# Patient Record
Sex: FEMALE | Race: WHITE | Employment: OTHER | URBAN - NONMETROPOLITAN AREA
[De-identification: names, ages, dates, MRNs, and addresses within clinical notes are randomized per-mention and may not be internally consistent; named-entity substitution may affect disease eponyms.]

---

## 2017-01-31 ENCOUNTER — DOCTOR'S OFFICE (OUTPATIENT)
Dept: URBAN - NONMETROPOLITAN AREA CLINIC 1 | Facility: CLINIC | Age: 77
Setting detail: OPHTHALMOLOGY
End: 2017-01-31
Payer: COMMERCIAL

## 2017-01-31 DIAGNOSIS — H43.813: ICD-10-CM

## 2017-01-31 DIAGNOSIS — Z96.1: ICD-10-CM

## 2017-01-31 DIAGNOSIS — H35.373: ICD-10-CM

## 2017-01-31 DIAGNOSIS — E11.9: ICD-10-CM

## 2017-01-31 DIAGNOSIS — H40.003: ICD-10-CM

## 2017-01-31 PROCEDURE — 92014 COMPRE OPH EXAM EST PT 1/>: CPT | Performed by: OPHTHALMOLOGY

## 2017-01-31 PROCEDURE — 76514 ECHO EXAM OF EYE THICKNESS: CPT | Performed by: OPHTHALMOLOGY

## 2017-01-31 PROCEDURE — 92133 CPTRZD OPH DX IMG PST SGM ON: CPT | Performed by: OPHTHALMOLOGY

## 2017-01-31 ASSESSMENT — REFRACTION_AUTOREFRACTION
OD_AXIS: 79
OS_CYLINDER: -2.75
OS_AXIS: 67
OD_CYLINDER: -2.25
OS_SPHERE: +2.75
OD_SPHERE: +2.00

## 2017-01-31 ASSESSMENT — REFRACTION_MANIFEST
OS_VA3: 20/
OS_VA2: 20/
OU_VA: 20/
OD_VA3: 20/
OS_VA1: 20/
OS_VA2: 20/
OD_VA1: 20/
OS_VA1: 20/
OD_VA2: 20/
OD_VA2: 20/
OS_VA1: 20/
OU_VA: 20/
OD_VA3: 20/
OD_VA1: 20/
OU_VA: 20/
OS_VA3: 20/
OS_VA3: 20/
OS_VA2: 20/
OD_VA1: 20/
OD_VA2: 20/
OD_VA3: 20/

## 2017-01-31 ASSESSMENT — REFRACTION_CURRENTRX
OD_OVR_VA: 20/
OS_OVR_VA: 20/
OD_AXIS: 096
OS_CYLINDER: -1.00
OD_CYLINDER: -1.75
OD_OVR_VA: 20/
OS_OVR_VA: 20/
OS_AXIS: 068
OS_OVR_VA: 20/
OS_SPHERE: +1.00
OD_SPHERE: +0.50
OD_OVR_VA: 20/
OS_VPRISM_DIRECTION: SV
OD_VPRISM_DIRECTION: SV

## 2017-01-31 ASSESSMENT — SPHEQUIV_DERIVED
OD_SPHEQUIV: 0.875
OS_SPHEQUIV: 1.375

## 2017-01-31 ASSESSMENT — CONFRONTATIONAL VISUAL FIELD TEST (CVF)
OD_FINDINGS: FULL
OS_FINDINGS: FULL

## 2017-01-31 ASSESSMENT — PACHYMETRY
OD_CT_CORRECTION: -3
OD_CT_UM: 580
OS_CT_UM: 580
OS_CT_CORRECTION: -3

## 2017-01-31 ASSESSMENT — VISUAL ACUITY
OS_BCVA: 20/25
OD_BCVA: 20/25-2

## 2017-01-31 ASSESSMENT — LID POSITION - DERMATOCHALASIS
OD_DERMATOCHALASIS: +2
OS_DERMATOCHALASIS: +2

## 2017-08-08 ENCOUNTER — DOCTOR'S OFFICE (OUTPATIENT)
Dept: URBAN - NONMETROPOLITAN AREA CLINIC 1 | Facility: CLINIC | Age: 77
Setting detail: OPHTHALMOLOGY
End: 2017-08-08
Payer: COMMERCIAL

## 2017-08-08 DIAGNOSIS — H43.813: ICD-10-CM

## 2017-08-08 DIAGNOSIS — H40.003: ICD-10-CM

## 2017-08-08 DIAGNOSIS — E11.9: ICD-10-CM

## 2017-08-08 DIAGNOSIS — E11.3293: ICD-10-CM

## 2017-08-08 DIAGNOSIS — Z96.1: ICD-10-CM

## 2017-08-08 DIAGNOSIS — H35.373: ICD-10-CM

## 2017-08-08 PROCEDURE — 92083 EXTENDED VISUAL FIELD XM: CPT | Performed by: OPHTHALMOLOGY

## 2017-08-08 PROCEDURE — 92250 FUNDUS PHOTOGRAPHY W/I&R: CPT | Performed by: OPHTHALMOLOGY

## 2017-08-08 PROCEDURE — 92014 COMPRE OPH EXAM EST PT 1/>: CPT | Performed by: OPHTHALMOLOGY

## 2017-08-08 PROCEDURE — 92134 CPTRZ OPH DX IMG PST SGM RTA: CPT | Performed by: OPHTHALMOLOGY

## 2017-08-08 ASSESSMENT — REFRACTION_AUTOREFRACTION
OS_AXIS: 71
OD_CYLINDER: -2.00
OS_SPHERE: +2.50
OD_AXIS: 85
OD_SPHERE: +1.00
OS_CYLINDER: -2.50

## 2017-08-08 ASSESSMENT — VISUAL ACUITY
OD_BCVA: 20/30+1
OS_BCVA: 20/25-2

## 2017-08-08 ASSESSMENT — REFRACTION_MANIFEST
OU_VA: 20/
OD_VA1: 20/
OS_VA1: 20/
OS_VA2: 20/
OS_VA1: 20/
OD_VA3: 20/
OS_VA3: 20/
OS_VA2: 20/
OS_VA2: 20/
OD_VA2: 20/
OD_VA2: 20/
OU_VA: 20/
OU_VA: 20/
OS_VA1: 20/
OS_VA3: 20/
OD_VA1: 20/
OS_VA3: 20/
OD_VA2: 20/
OD_VA1: 20/
OD_VA3: 20/
OD_VA3: 20/

## 2017-08-08 ASSESSMENT — REFRACTION_CURRENTRX
OS_OVR_VA: 20/
OS_OVR_VA: 20/
OD_OVR_VA: 20/
OD_SPHERE: +0.50
OS_VPRISM_DIRECTION: SV
OD_OVR_VA: 20/
OD_VPRISM_DIRECTION: SV
OS_SPHERE: +1.00
OS_AXIS: 068
OD_CYLINDER: -1.75
OD_AXIS: 096
OD_OVR_VA: 20/
OS_CYLINDER: -1.00
OS_OVR_VA: 20/

## 2017-08-08 ASSESSMENT — SPHEQUIV_DERIVED
OD_SPHEQUIV: 0
OS_SPHEQUIV: 1.25

## 2017-08-08 ASSESSMENT — LID POSITION - DERMATOCHALASIS
OS_DERMATOCHALASIS: 2+
OD_DERMATOCHALASIS: 2+

## 2017-08-08 ASSESSMENT — CONFRONTATIONAL VISUAL FIELD TEST (CVF)
OS_FINDINGS: FULL
OD_FINDINGS: FULL

## 2017-08-21 ENCOUNTER — DOCTOR'S OFFICE (OUTPATIENT)
Dept: URBAN - NONMETROPOLITAN AREA CLINIC 1 | Facility: CLINIC | Age: 77
Setting detail: OPHTHALMOLOGY
End: 2017-08-21
Payer: COMMERCIAL

## 2017-08-21 DIAGNOSIS — H40.003: ICD-10-CM

## 2017-08-21 DIAGNOSIS — H43.813: ICD-10-CM

## 2017-08-21 DIAGNOSIS — H35.373: ICD-10-CM

## 2017-08-21 DIAGNOSIS — E11.3293: ICD-10-CM

## 2017-08-21 DIAGNOSIS — Z96.1: ICD-10-CM

## 2017-08-21 PROCEDURE — 92014 COMPRE OPH EXAM EST PT 1/>: CPT | Performed by: OPHTHALMOLOGY

## 2017-08-21 ASSESSMENT — REFRACTION_CURRENTRX
OD_SPHERE: +0.50
OS_AXIS: 068
OD_CYLINDER: -1.75
OS_OVR_VA: 20/
OS_SPHERE: +1.00
OD_OVR_VA: 20/
OS_OVR_VA: 20/
OS_CYLINDER: -1.00
OD_AXIS: 096
OS_VPRISM_DIRECTION: SV
OD_VPRISM_DIRECTION: SV
OD_OVR_VA: 20/
OS_OVR_VA: 20/
OD_OVR_VA: 20/

## 2017-08-21 ASSESSMENT — REFRACTION_MANIFEST
OS_VA3: 20/
OD_VA1: 20/
OD_VA2: 20/
OS_VA3: 20/
OD_VA1: 20/
OS_VA1: 20/
OD_VA3: 20/
OD_VA3: 20/
OS_VA2: 20/
OU_VA: 20/
OD_VA3: 20/
OD_VA2: 20/
OD_VA2: 20/
OU_VA: 20/
OS_VA3: 20/
OS_VA1: 20/
OS_VA2: 20/
OS_VA1: 20/
OU_VA: 20/
OS_VA2: 20/
OD_VA1: 20/

## 2017-08-21 ASSESSMENT — REFRACTION_AUTOREFRACTION
OS_CYLINDER: -2.50
OD_CYLINDER: -2.00
OD_SPHERE: +1.00
OS_SPHERE: +2.50
OD_AXIS: 85
OS_AXIS: 71

## 2017-08-21 ASSESSMENT — SPHEQUIV_DERIVED
OS_SPHEQUIV: 1.25
OD_SPHEQUIV: 0

## 2017-08-21 ASSESSMENT — CONFRONTATIONAL VISUAL FIELD TEST (CVF)
OS_FINDINGS: FULL
OD_FINDINGS: FULL

## 2017-08-21 ASSESSMENT — VISUAL ACUITY
OS_BCVA: 20/30
OD_BCVA: 20/30-2

## 2017-08-21 ASSESSMENT — LID POSITION - DERMATOCHALASIS
OD_DERMATOCHALASIS: 2+
OS_DERMATOCHALASIS: 2+

## 2017-09-28 ENCOUNTER — DOCTOR'S OFFICE (OUTPATIENT)
Dept: URBAN - NONMETROPOLITAN AREA CLINIC 1 | Facility: CLINIC | Age: 77
Setting detail: OPHTHALMOLOGY
End: 2017-09-28
Payer: COMMERCIAL

## 2017-09-28 DIAGNOSIS — H43.811: ICD-10-CM

## 2017-09-28 DIAGNOSIS — H43.812: ICD-10-CM

## 2017-09-28 DIAGNOSIS — H40.003: ICD-10-CM

## 2017-09-28 DIAGNOSIS — H43.813: ICD-10-CM

## 2017-09-28 DIAGNOSIS — E11.3293: ICD-10-CM

## 2017-09-28 DIAGNOSIS — H35.3131: ICD-10-CM

## 2017-09-28 DIAGNOSIS — H35.373: ICD-10-CM

## 2017-09-28 PROCEDURE — 92014 COMPRE OPH EXAM EST PT 1/>: CPT | Performed by: OPHTHALMOLOGY

## 2017-09-28 PROCEDURE — 92250 FUNDUS PHOTOGRAPHY W/I&R: CPT | Performed by: OPHTHALMOLOGY

## 2017-09-28 PROCEDURE — 92226 OPHTHALMOSCOPY EXT SUBSEQUENT: CPT | Performed by: OPHTHALMOLOGY

## 2017-09-28 PROCEDURE — 92235 FLUORESCEIN ANGRPH MLTIFRAME: CPT | Performed by: OPHTHALMOLOGY

## 2017-09-28 ASSESSMENT — REFRACTION_MANIFEST
OU_VA: 20/
OD_VA2: 20/
OD_VA3: 20/
OS_VA2: 20/
OD_VA2: 20/
OD_VA1: 20/
OS_VA3: 20/
OD_VA1: 20/
OU_VA: 20/
OS_VA2: 20/
OD_VA3: 20/
OS_VA2: 20/
OD_VA3: 20/
OD_VA2: 20/
OS_VA3: 20/
OS_VA1: 20/
OS_VA1: 20/
OS_VA3: 20/
OS_VA1: 20/
OD_VA1: 20/
OU_VA: 20/

## 2017-09-28 ASSESSMENT — REFRACTION_AUTOREFRACTION
OS_SPHERE: +2.50
OD_CYLINDER: -2.00
OD_SPHERE: +1.00
OD_AXIS: 85
OS_AXIS: 71
OS_CYLINDER: -2.50

## 2017-09-28 ASSESSMENT — REFRACTION_CURRENTRX
OS_SPHERE: +1.00
OD_OVR_VA: 20/
OS_OVR_VA: 20/
OS_OVR_VA: 20/
OS_CYLINDER: -1.00
OD_SPHERE: +0.50
OD_OVR_VA: 20/
OD_CYLINDER: -1.75
OS_AXIS: 068
OD_AXIS: 096
OD_OVR_VA: 20/
OS_OVR_VA: 20/
OS_VPRISM_DIRECTION: SV
OD_VPRISM_DIRECTION: SV

## 2017-09-28 ASSESSMENT — VISUAL ACUITY
OD_BCVA: 20/40
OS_BCVA: 20/50

## 2017-09-28 ASSESSMENT — SPHEQUIV_DERIVED
OD_SPHEQUIV: 0
OS_SPHEQUIV: 1.25

## 2017-09-28 ASSESSMENT — CONFRONTATIONAL VISUAL FIELD TEST (CVF)
OS_FINDINGS: FULL
OD_FINDINGS: FULL

## 2017-09-28 ASSESSMENT — LID POSITION - DERMATOCHALASIS
OS_DERMATOCHALASIS: 2+
OD_DERMATOCHALASIS: 2+

## 2018-02-07 ENCOUNTER — DOCTOR'S OFFICE (OUTPATIENT)
Dept: URBAN - NONMETROPOLITAN AREA CLINIC 1 | Facility: CLINIC | Age: 78
Setting detail: OPHTHALMOLOGY
End: 2018-02-07
Payer: COMMERCIAL

## 2018-02-07 DIAGNOSIS — H35.373: ICD-10-CM

## 2018-02-07 DIAGNOSIS — H43.813: ICD-10-CM

## 2018-02-07 DIAGNOSIS — H35.3131: ICD-10-CM

## 2018-02-07 DIAGNOSIS — H40.013: ICD-10-CM

## 2018-02-07 DIAGNOSIS — E11.9: ICD-10-CM

## 2018-02-07 PROCEDURE — 92014 COMPRE OPH EXAM EST PT 1/>: CPT | Performed by: OPHTHALMOLOGY

## 2018-02-07 PROCEDURE — 76514 ECHO EXAM OF EYE THICKNESS: CPT | Performed by: OPHTHALMOLOGY

## 2018-02-07 PROCEDURE — 92133 CPTRZD OPH DX IMG PST SGM ON: CPT | Performed by: OPHTHALMOLOGY

## 2018-02-07 ASSESSMENT — REFRACTION_CURRENTRX
OD_SPHERE: +0.50
OD_AXIS: 096
OS_SPHERE: +1.00
OS_OVR_VA: 20/
OD_VPRISM_DIRECTION: PROGS
OS_CYLINDER: -1.00
OD_OVR_VA: 20/
OD_OVR_VA: 20/
OS_OVR_VA: 20/
OD_ADD: +1.50
OS_ADD: +1.50
OS_VPRISM_DIRECTION: PROGS
OD_OVR_VA: 20/
OS_AXIS: 068
OD_CYLINDER: -1.75
OS_OVR_VA: 20/

## 2018-02-07 ASSESSMENT — REFRACTION_AUTOREFRACTION
OS_AXIS: 59
OS_SPHERE: +1.50
OS_CYLINDER: -1.50
OD_AXIS: 101
OD_SPHERE: +0.75
OD_CYLINDER: -1.50

## 2018-02-07 ASSESSMENT — REFRACTION_MANIFEST
OS_VA2: 20/
OS_VA3: 20/
OD_VA2: 20/
OD_VA1: 20/
OS_VA3: 20/
OS_VA1: 20/
OS_VA1: 20/
OD_VA3: 20/
OU_VA: 20/
OS_VA1: 20/
OU_VA: 20/
OD_VA3: 20/
OS_VA3: 20/
OD_VA2: 20/
OS_VA2: 20/
OD_VA1: 20/
OD_VA1: 20/
OU_VA: 20/
OS_VA2: 20/
OD_VA3: 20/
OD_VA2: 20/

## 2018-02-07 ASSESSMENT — VISUAL ACUITY
OS_BCVA: 20/30
OD_BCVA: 20/30

## 2018-02-07 ASSESSMENT — SPHEQUIV_DERIVED
OS_SPHEQUIV: 0.75
OD_SPHEQUIV: 0

## 2018-02-07 ASSESSMENT — PACHYMETRY
OS_CT_CORRECTION: -1
OS_CT_UM: 567
OD_CT_UM: 575
OD_CT_CORRECTION: -2

## 2018-02-07 ASSESSMENT — LID POSITION - DERMATOCHALASIS
OS_DERMATOCHALASIS: 2+
OD_DERMATOCHALASIS: 2+

## 2018-02-07 ASSESSMENT — CONFRONTATIONAL VISUAL FIELD TEST (CVF)
OS_FINDINGS: FULL
OD_FINDINGS: FULL

## 2018-02-07 ASSESSMENT — DRY EYES - PHYSICIAN NOTES
OD_GENERALCOMMENTS: KSICCA
OS_GENERALCOMMENTS: KSICCA

## 2018-03-29 ENCOUNTER — DOCTOR'S OFFICE (OUTPATIENT)
Dept: URBAN - NONMETROPOLITAN AREA CLINIC 1 | Facility: CLINIC | Age: 78
Setting detail: OPHTHALMOLOGY
End: 2018-03-29
Payer: COMMERCIAL

## 2018-03-29 DIAGNOSIS — H35.373: ICD-10-CM

## 2018-03-29 DIAGNOSIS — E11.9: ICD-10-CM

## 2018-03-29 DIAGNOSIS — H43.811: ICD-10-CM

## 2018-03-29 DIAGNOSIS — H40.003: ICD-10-CM

## 2018-03-29 DIAGNOSIS — H43.813: ICD-10-CM

## 2018-03-29 DIAGNOSIS — H35.3131: ICD-10-CM

## 2018-03-29 DIAGNOSIS — H43.812: ICD-10-CM

## 2018-03-29 PROCEDURE — 92134 CPTRZ OPH DX IMG PST SGM RTA: CPT | Performed by: OPHTHALMOLOGY

## 2018-03-29 PROCEDURE — 92226 OPHTHALMOSCOPY EXT SUBSEQUENT: CPT | Performed by: OPHTHALMOLOGY

## 2018-03-29 PROCEDURE — 92014 COMPRE OPH EXAM EST PT 1/>: CPT | Performed by: OPHTHALMOLOGY

## 2018-03-29 ASSESSMENT — REFRACTION_CURRENTRX
OD_OVR_VA: 20/
OS_OVR_VA: 20/
OS_SPHERE: +1.00
OS_AXIS: 068
OD_SPHERE: +0.50
OD_AXIS: 096
OS_CYLINDER: -1.00
OS_VPRISM_DIRECTION: PROGS
OS_OVR_VA: 20/
OD_OVR_VA: 20/
OD_OVR_VA: 20/
OS_OVR_VA: 20/
OD_CYLINDER: -1.75
OS_ADD: +1.50
OD_ADD: +1.50
OD_VPRISM_DIRECTION: PROGS

## 2018-03-29 ASSESSMENT — DRY EYES - PHYSICIAN NOTES
OS_GENERALCOMMENTS: KSICCA
OD_GENERALCOMMENTS: KSICCA

## 2018-03-29 ASSESSMENT — REFRACTION_MANIFEST
OS_VA2: 20/
OD_VA3: 20/
OD_VA3: 20/
OS_VA2: 20/
OU_VA: 20/
OS_VA1: 20/
OU_VA: 20/
OD_VA2: 20/
OD_VA1: 20/
OD_VA3: 20/
OS_VA3: 20/
OS_VA3: 20/
OD_VA2: 20/
OD_VA1: 20/
OD_VA2: 20/
OD_VA1: 20/
OU_VA: 20/
OS_VA3: 20/
OS_VA2: 20/

## 2018-03-29 ASSESSMENT — CONFRONTATIONAL VISUAL FIELD TEST (CVF)
OD_FINDINGS: FULL
OS_FINDINGS: FULL

## 2018-03-29 ASSESSMENT — LID POSITION - DERMATOCHALASIS
OS_DERMATOCHALASIS: 2+
OD_DERMATOCHALASIS: 2+

## 2018-03-29 ASSESSMENT — REFRACTION_AUTOREFRACTION
OS_CYLINDER: -1.50
OS_AXIS: 59
OS_SPHERE: +1.50
OD_AXIS: 101
OD_CYLINDER: -1.50
OD_SPHERE: +0.75

## 2018-03-29 ASSESSMENT — SPHEQUIV_DERIVED
OD_SPHEQUIV: 0
OS_SPHEQUIV: 0.75

## 2018-03-29 ASSESSMENT — VISUAL ACUITY
OS_BCVA: 20/40-2
OD_BCVA: 20/30

## 2018-04-12 ENCOUNTER — DOCTOR'S OFFICE (OUTPATIENT)
Dept: URBAN - NONMETROPOLITAN AREA CLINIC 1 | Facility: CLINIC | Age: 78
Setting detail: OPHTHALMOLOGY
End: 2018-04-12
Payer: COMMERCIAL

## 2018-04-12 DIAGNOSIS — H43.813: ICD-10-CM

## 2018-04-12 DIAGNOSIS — E11.9: ICD-10-CM

## 2018-04-12 DIAGNOSIS — H35.3131: ICD-10-CM

## 2018-04-12 DIAGNOSIS — H35.373: ICD-10-CM

## 2018-04-12 DIAGNOSIS — H35.3112: ICD-10-CM

## 2018-04-12 DIAGNOSIS — H40.003: ICD-10-CM

## 2018-04-12 PROCEDURE — 92014 COMPRE OPH EXAM EST PT 1/>: CPT | Performed by: OPHTHALMOLOGY

## 2018-04-12 PROCEDURE — 92235 FLUORESCEIN ANGRPH MLTIFRAME: CPT | Performed by: OPHTHALMOLOGY

## 2018-04-12 PROCEDURE — 92250 FUNDUS PHOTOGRAPHY W/I&R: CPT | Performed by: OPHTHALMOLOGY

## 2018-04-12 ASSESSMENT — REFRACTION_AUTOREFRACTION
OS_AXIS: 59
OD_CYLINDER: -1.50
OD_SPHERE: +0.75
OD_AXIS: 101
OS_SPHERE: +1.50
OS_CYLINDER: -1.50

## 2018-04-12 ASSESSMENT — REFRACTION_MANIFEST
OD_VA2: 20/
OD_VA1: 20/
OS_VA1: 20/
OS_VA2: 20/
OD_VA3: 20/
OD_VA2: 20/
OU_VA: 20/
OS_VA2: 20/
OD_VA1: 20/
OS_VA3: 20/
OD_VA3: 20/
OD_VA2: 20/
OD_VA3: 20/
OS_VA3: 20/
OS_VA3: 20/
OS_VA1: 20/
OS_VA1: 20/
OU_VA: 20/
OS_VA2: 20/
OU_VA: 20/
OD_VA1: 20/

## 2018-04-12 ASSESSMENT — REFRACTION_CURRENTRX
OS_OVR_VA: 20/
OD_AXIS: 096
OS_CYLINDER: -1.00
OS_AXIS: 068
OD_OVR_VA: 20/
OD_OVR_VA: 20/
OD_ADD: +1.50
OD_OVR_VA: 20/
OD_SPHERE: +0.50
OS_SPHERE: +1.00
OS_ADD: +1.50
OS_OVR_VA: 20/
OD_CYLINDER: -1.75
OS_VPRISM_DIRECTION: PROGS
OS_OVR_VA: 20/
OD_VPRISM_DIRECTION: PROGS

## 2018-04-12 ASSESSMENT — DRY EYES - PHYSICIAN NOTES
OD_GENERALCOMMENTS: KSICCA
OS_GENERALCOMMENTS: KSICCA

## 2018-04-12 ASSESSMENT — CONFRONTATIONAL VISUAL FIELD TEST (CVF)
OS_FINDINGS: FULL
OD_FINDINGS: FULL

## 2018-04-12 ASSESSMENT — VISUAL ACUITY
OD_BCVA: 20/30-1
OS_BCVA: 20/40+2

## 2018-04-12 ASSESSMENT — LID POSITION - DERMATOCHALASIS
OD_DERMATOCHALASIS: 2+
OS_DERMATOCHALASIS: 2+

## 2018-04-12 ASSESSMENT — SPHEQUIV_DERIVED
OS_SPHEQUIV: 0.75
OD_SPHEQUIV: 0

## 2018-05-21 ENCOUNTER — DOCTOR'S OFFICE (OUTPATIENT)
Dept: URBAN - NONMETROPOLITAN AREA CLINIC 1 | Facility: CLINIC | Age: 78
Setting detail: OPHTHALMOLOGY
End: 2018-05-21
Payer: COMMERCIAL

## 2018-05-21 DIAGNOSIS — H35.3131: ICD-10-CM

## 2018-05-21 DIAGNOSIS — E11.9: ICD-10-CM

## 2018-05-21 DIAGNOSIS — H40.003: ICD-10-CM

## 2018-05-21 DIAGNOSIS — H35.3112: ICD-10-CM

## 2018-05-21 DIAGNOSIS — H43.813: ICD-10-CM

## 2018-05-21 DIAGNOSIS — H43.812: ICD-10-CM

## 2018-05-21 DIAGNOSIS — H35.373: ICD-10-CM

## 2018-05-21 DIAGNOSIS — H43.811: ICD-10-CM

## 2018-05-21 PROCEDURE — 92226 OPHTHALMOSCOPY EXT SUBSEQUENT: CPT | Performed by: OPHTHALMOLOGY

## 2018-05-21 PROCEDURE — 92134 CPTRZ OPH DX IMG PST SGM RTA: CPT | Performed by: OPHTHALMOLOGY

## 2018-05-21 PROCEDURE — 92014 COMPRE OPH EXAM EST PT 1/>: CPT | Performed by: OPHTHALMOLOGY

## 2018-05-21 ASSESSMENT — REFRACTION_AUTOREFRACTION
OD_SPHERE: +0.75
OD_CYLINDER: -1.50
OS_CYLINDER: -1.50
OS_SPHERE: +1.50
OD_AXIS: 101
OS_AXIS: 59

## 2018-05-21 ASSESSMENT — REFRACTION_MANIFEST
OS_VA2: 20/
OU_VA: 20/
OD_VA3: 20/
OD_VA1: 20/
OD_VA2: 20/
OS_VA3: 20/
OU_VA: 20/
OS_VA2: 20/
OS_VA2: 20/
OD_VA3: 20/
OD_VA1: 20/
OS_VA1: 20/
OU_VA: 20/
OD_VA2: 20/
OS_VA3: 20/
OD_VA1: 20/
OS_VA3: 20/
OS_VA1: 20/
OD_VA2: 20/
OS_VA1: 20/
OD_VA3: 20/

## 2018-05-21 ASSESSMENT — LID POSITION - DERMATOCHALASIS
OD_DERMATOCHALASIS: 2+
OS_DERMATOCHALASIS: 2+

## 2018-05-21 ASSESSMENT — REFRACTION_CURRENTRX
OD_ADD: +1.50
OS_VPRISM_DIRECTION: PROGS
OD_AXIS: 096
OS_SPHERE: +1.00
OS_ADD: +1.50
OS_AXIS: 068
OS_OVR_VA: 20/
OS_OVR_VA: 20/
OD_OVR_VA: 20/
OD_OVR_VA: 20/
OD_SPHERE: +0.50
OD_CYLINDER: -1.75
OS_CYLINDER: -1.00
OS_OVR_VA: 20/
OD_OVR_VA: 20/
OD_VPRISM_DIRECTION: PROGS

## 2018-05-21 ASSESSMENT — CONFRONTATIONAL VISUAL FIELD TEST (CVF)
OD_FINDINGS: FULL
OS_FINDINGS: FULL

## 2018-05-21 ASSESSMENT — SPHEQUIV_DERIVED
OS_SPHEQUIV: 0.75
OD_SPHEQUIV: 0

## 2018-05-21 ASSESSMENT — VISUAL ACUITY
OD_BCVA: 20/30-2
OS_BCVA: 20/30

## 2018-05-21 ASSESSMENT — DRY EYES - PHYSICIAN NOTES
OD_GENERALCOMMENTS: KSICCA
OS_GENERALCOMMENTS: KSICCA

## 2018-06-18 ENCOUNTER — RX ONLY (RX ONLY)
Age: 78
End: 2018-06-18

## 2018-06-18 ENCOUNTER — DOCTOR'S OFFICE (OUTPATIENT)
Dept: URBAN - NONMETROPOLITAN AREA CLINIC 1 | Facility: CLINIC | Age: 78
Setting detail: OPHTHALMOLOGY
End: 2018-06-18
Payer: COMMERCIAL

## 2018-06-18 DIAGNOSIS — H04.122: ICD-10-CM

## 2018-06-18 DIAGNOSIS — H43.811: ICD-10-CM

## 2018-06-18 DIAGNOSIS — H35.373: ICD-10-CM

## 2018-06-18 DIAGNOSIS — H35.3112: ICD-10-CM

## 2018-06-18 DIAGNOSIS — H43.813: ICD-10-CM

## 2018-06-18 DIAGNOSIS — H43.812: ICD-10-CM

## 2018-06-18 DIAGNOSIS — H35.3131: ICD-10-CM

## 2018-06-18 DIAGNOSIS — H04.121: ICD-10-CM

## 2018-06-18 PROCEDURE — 92226 OPHTHALMOSCOPY EXT SUBSEQUENT: CPT | Performed by: OPHTHALMOLOGY

## 2018-06-18 PROCEDURE — 92134 CPTRZ OPH DX IMG PST SGM RTA: CPT | Performed by: OPHTHALMOLOGY

## 2018-06-18 PROCEDURE — 92014 COMPRE OPH EXAM EST PT 1/>: CPT | Performed by: OPHTHALMOLOGY

## 2018-06-18 PROCEDURE — 83861 MICROFLUID ANALY TEARS: CPT | Performed by: OPHTHALMOLOGY

## 2018-06-18 ASSESSMENT — REFRACTION_MANIFEST
OS_VA1: 20/
OS_VA1: 20/
OU_VA: 20/
OD_VA2: 20/
OS_VA2: 20/
OS_VA2: 20/
OD_VA1: 20/
OS_VA1: 20/
OS_VA3: 20/
OS_VA3: 20/
OD_VA3: 20/
OD_VA3: 20/
OS_VA2: 20/
OD_VA2: 20/
OS_VA3: 20/
OD_VA1: 20/
OD_VA2: 20/
OD_VA1: 20/
OU_VA: 20/
OD_VA3: 20/
OU_VA: 20/

## 2018-06-18 ASSESSMENT — REFRACTION_CURRENTRX
OS_ADD: +1.50
OD_CYLINDER: -1.75
OS_OVR_VA: 20/
OD_OVR_VA: 20/
OS_OVR_VA: 20/
OS_SPHERE: +1.00
OS_OVR_VA: 20/
OD_OVR_VA: 20/
OD_ADD: +1.50
OS_VPRISM_DIRECTION: PROGS
OD_SPHERE: +0.50
OS_CYLINDER: -1.00
OD_VPRISM_DIRECTION: PROGS
OD_OVR_VA: 20/
OD_AXIS: 096
OS_AXIS: 068

## 2018-06-18 ASSESSMENT — VISUAL ACUITY
OD_BCVA: 20/25-2
OS_BCVA: 20/30+1

## 2018-06-18 ASSESSMENT — DRY EYES - PHYSICIAN NOTES
OS_GENERALCOMMENTS: KSICCA
OD_GENERALCOMMENTS: KSICCA

## 2018-06-18 ASSESSMENT — SPHEQUIV_DERIVED
OD_SPHEQUIV: 0
OS_SPHEQUIV: 0.75

## 2018-06-18 ASSESSMENT — REFRACTION_AUTOREFRACTION
OD_SPHERE: +0.75
OS_CYLINDER: -1.50
OS_SPHERE: +1.50
OS_AXIS: 59
OD_CYLINDER: -1.50
OD_AXIS: 101

## 2018-06-18 ASSESSMENT — CONFRONTATIONAL VISUAL FIELD TEST (CVF)
OS_FINDINGS: FULL
OD_FINDINGS: FULL

## 2018-06-18 ASSESSMENT — LID POSITION - DERMATOCHALASIS
OS_DERMATOCHALASIS: 2+
OD_DERMATOCHALASIS: 2+

## 2018-07-20 ENCOUNTER — DOCTOR'S OFFICE (OUTPATIENT)
Dept: URBAN - NONMETROPOLITAN AREA CLINIC 1 | Facility: CLINIC | Age: 78
Setting detail: OPHTHALMOLOGY
End: 2018-07-20
Payer: COMMERCIAL

## 2018-07-20 DIAGNOSIS — H35.373: ICD-10-CM

## 2018-07-20 DIAGNOSIS — H43.811: ICD-10-CM

## 2018-07-20 DIAGNOSIS — H43.812: ICD-10-CM

## 2018-07-20 DIAGNOSIS — H35.3131: ICD-10-CM

## 2018-07-20 DIAGNOSIS — H35.3112: ICD-10-CM

## 2018-07-20 DIAGNOSIS — H04.122: ICD-10-CM

## 2018-07-20 DIAGNOSIS — H43.813: ICD-10-CM

## 2018-07-20 DIAGNOSIS — H04.123: ICD-10-CM

## 2018-07-20 DIAGNOSIS — H04.121: ICD-10-CM

## 2018-07-20 DIAGNOSIS — E11.9: ICD-10-CM

## 2018-07-20 PROCEDURE — 92014 COMPRE OPH EXAM EST PT 1/>: CPT | Performed by: OPHTHALMOLOGY

## 2018-07-20 PROCEDURE — 83861 MICROFLUID ANALY TEARS: CPT | Performed by: OPHTHALMOLOGY

## 2018-07-20 PROCEDURE — 92134 CPTRZ OPH DX IMG PST SGM RTA: CPT | Performed by: OPHTHALMOLOGY

## 2018-07-20 PROCEDURE — 92226 OPHTHALMOSCOPY EXT SUBSEQUENT: CPT | Performed by: OPHTHALMOLOGY

## 2018-07-20 ASSESSMENT — REFRACTION_MANIFEST
OD_VA2: 20/
OU_VA: 20/
OD_VA3: 20/
OS_VA2: 20/
OD_VA2: 20/
OS_VA3: 20/
OS_VA2: 20/
OU_VA: 20/
OD_VA1: 20/
OS_VA1: 20/
OU_VA: 20/
OD_VA3: 20/
OD_VA1: 20/
OD_VA3: 20/
OD_VA2: 20/
OS_VA2: 20/
OD_VA1: 20/
OS_VA1: 20/
OS_VA3: 20/
OS_VA3: 20/
OS_VA1: 20/

## 2018-07-20 ASSESSMENT — VISUAL ACUITY
OS_BCVA: 20/25-1
OD_BCVA: 20/40+2

## 2018-07-20 ASSESSMENT — REFRACTION_CURRENTRX
OD_ADD: +1.50
OS_OVR_VA: 20/
OD_OVR_VA: 20/
OD_OVR_VA: 20/
OS_VPRISM_DIRECTION: PROGS
OS_ADD: +1.50
OS_AXIS: 068
OD_VPRISM_DIRECTION: PROGS
OS_OVR_VA: 20/
OD_SPHERE: +0.50
OD_AXIS: 096
OS_SPHERE: +1.00
OD_OVR_VA: 20/
OS_CYLINDER: -1.00
OS_OVR_VA: 20/
OD_CYLINDER: -1.75

## 2018-07-20 ASSESSMENT — DRY EYES - PHYSICIAN NOTES
OD_GENERALCOMMENTS: KSICCA
OS_GENERALCOMMENTS: KSICCA

## 2018-07-20 ASSESSMENT — SPHEQUIV_DERIVED
OD_SPHEQUIV: 0
OS_SPHEQUIV: 0.75

## 2018-07-20 ASSESSMENT — CONFRONTATIONAL VISUAL FIELD TEST (CVF)
OS_FINDINGS: FULL
OD_FINDINGS: FULL

## 2018-07-20 ASSESSMENT — REFRACTION_AUTOREFRACTION
OS_SPHERE: +1.50
OS_CYLINDER: -1.50
OS_AXIS: 59
OD_SPHERE: +0.75
OD_CYLINDER: -1.50
OD_AXIS: 101

## 2018-07-20 ASSESSMENT — LID POSITION - DERMATOCHALASIS
OS_DERMATOCHALASIS: 2+
OD_DERMATOCHALASIS: 2+

## 2018-08-06 ENCOUNTER — DOCTOR'S OFFICE (OUTPATIENT)
Dept: URBAN - NONMETROPOLITAN AREA CLINIC 1 | Facility: CLINIC | Age: 78
Setting detail: OPHTHALMOLOGY
End: 2018-08-06
Payer: COMMERCIAL

## 2018-08-06 DIAGNOSIS — H43.813: ICD-10-CM

## 2018-08-06 DIAGNOSIS — H35.3112: ICD-10-CM

## 2018-08-06 DIAGNOSIS — H40.003: ICD-10-CM

## 2018-08-06 DIAGNOSIS — H02.834: ICD-10-CM

## 2018-08-06 DIAGNOSIS — H02.831: ICD-10-CM

## 2018-08-06 DIAGNOSIS — H35.3131: ICD-10-CM

## 2018-08-06 DIAGNOSIS — H53.122: ICD-10-CM

## 2018-08-06 DIAGNOSIS — E11.9: ICD-10-CM

## 2018-08-06 DIAGNOSIS — H35.373: ICD-10-CM

## 2018-08-06 DIAGNOSIS — H04.123: ICD-10-CM

## 2018-08-06 PROCEDURE — 92014 COMPRE OPH EXAM EST PT 1/>: CPT | Performed by: OPHTHALMOLOGY

## 2018-08-06 PROCEDURE — 92250 FUNDUS PHOTOGRAPHY W/I&R: CPT | Performed by: OPHTHALMOLOGY

## 2018-08-06 PROCEDURE — 92235 FLUORESCEIN ANGRPH MLTIFRAME: CPT | Performed by: OPHTHALMOLOGY

## 2018-08-06 ASSESSMENT — REFRACTION_AUTOREFRACTION
OD_SPHERE: +0.75
OS_AXIS: 59
OD_AXIS: 101
OS_SPHERE: +1.50
OS_CYLINDER: -1.50
OD_CYLINDER: -1.50

## 2018-08-06 ASSESSMENT — REFRACTION_MANIFEST
OS_VA2: 20/
OD_VA3: 20/
OD_VA2: 20/
OD_VA1: 20/
OS_VA1: 20/
OU_VA: 20/
OS_VA2: 20/
OU_VA: 20/
OD_VA2: 20/
OD_VA2: 20/
OS_VA2: 20/
OS_VA3: 20/
OS_VA3: 20/
OU_VA: 20/
OS_VA3: 20/
OD_VA1: 20/
OS_VA1: 20/
OD_VA3: 20/
OD_VA1: 20/
OS_VA1: 20/
OD_VA3: 20/

## 2018-08-06 ASSESSMENT — REFRACTION_CURRENTRX
OS_OVR_VA: 20/
OS_AXIS: 068
OD_OVR_VA: 20/
OS_CYLINDER: -1.00
OS_VPRISM_DIRECTION: PROGS
OD_OVR_VA: 20/
OD_ADD: +1.50
OS_SPHERE: +1.00
OS_OVR_VA: 20/
OD_SPHERE: +0.50
OD_OVR_VA: 20/
OD_AXIS: 096
OS_OVR_VA: 20/
OS_ADD: +1.50
OD_CYLINDER: -1.75
OD_VPRISM_DIRECTION: PROGS

## 2018-08-06 ASSESSMENT — SPHEQUIV_DERIVED
OS_SPHEQUIV: 0.75
OD_SPHEQUIV: 0

## 2018-08-06 ASSESSMENT — CONFRONTATIONAL VISUAL FIELD TEST (CVF)
OD_FINDINGS: FULL
OS_FINDINGS: FULL

## 2018-08-06 ASSESSMENT — DRY EYES - PHYSICIAN NOTES
OS_GENERALCOMMENTS: KSICCA
OD_GENERALCOMMENTS: KSICCA

## 2018-08-06 ASSESSMENT — LID POSITION - DERMATOCHALASIS
OD_DERMATOCHALASIS: RUL 2+ 3+
OS_DERMATOCHALASIS: LUL 2+

## 2018-08-06 ASSESSMENT — VISUAL ACUITY
OD_BCVA: 20/40-1
OS_BCVA: 20/25

## 2018-08-07 ENCOUNTER — DOCTOR'S OFFICE (OUTPATIENT)
Dept: URBAN - NONMETROPOLITAN AREA CLINIC 1 | Facility: CLINIC | Age: 78
Setting detail: OPHTHALMOLOGY
End: 2018-08-07
Payer: COMMERCIAL

## 2018-08-07 DIAGNOSIS — H02.831: ICD-10-CM

## 2018-08-07 DIAGNOSIS — H53.122: ICD-10-CM

## 2018-08-07 DIAGNOSIS — E11.9: ICD-10-CM

## 2018-08-07 DIAGNOSIS — H35.3112: ICD-10-CM

## 2018-08-07 DIAGNOSIS — H35.3131: ICD-10-CM

## 2018-08-07 DIAGNOSIS — Z96.1: ICD-10-CM

## 2018-08-07 DIAGNOSIS — H43.813: ICD-10-CM

## 2018-08-07 DIAGNOSIS — H35.373: ICD-10-CM

## 2018-08-07 DIAGNOSIS — H40.013: ICD-10-CM

## 2018-08-07 DIAGNOSIS — H04.123: ICD-10-CM

## 2018-08-07 PROCEDURE — 92083 EXTENDED VISUAL FIELD XM: CPT | Performed by: OPHTHALMOLOGY

## 2018-08-07 PROCEDURE — 92014 COMPRE OPH EXAM EST PT 1/>: CPT | Performed by: OPHTHALMOLOGY

## 2018-08-07 ASSESSMENT — REFRACTION_CURRENTRX
OD_OVR_VA: 20/
OD_SPHERE: +0.50
OS_AXIS: 068
OS_OVR_VA: 20/
OD_AXIS: 096
OS_OVR_VA: 20/
OD_VPRISM_DIRECTION: PROGS
OS_VPRISM_DIRECTION: PROGS
OD_OVR_VA: 20/
OD_OVR_VA: 20/
OS_CYLINDER: -1.00
OD_CYLINDER: -1.75
OS_OVR_VA: 20/
OD_ADD: +1.50
OS_ADD: +1.50
OS_SPHERE: +1.00

## 2018-08-07 ASSESSMENT — REFRACTION_MANIFEST
OD_VA1: 20/
OS_VA3: 20/
OS_VA2: 20/
OS_VA1: 20/
OS_VA2: 20/
OU_VA: 20/
OS_VA3: 20/
OS_VA3: 20/
OS_VA2: 20/
OU_VA: 20/
OD_VA3: 20/
OD_VA1: 20/
OD_VA3: 20/
OS_VA1: 20/
OD_VA3: 20/
OD_VA1: 20/
OD_VA2: 20/
OS_VA1: 20/
OU_VA: 20/

## 2018-08-07 ASSESSMENT — LID POSITION - DERMATOCHALASIS
OD_DERMATOCHALASIS: RUL 2+ 3+
OS_DERMATOCHALASIS: LUL 2+

## 2018-08-07 ASSESSMENT — REFRACTION_AUTOREFRACTION
OD_SPHERE: +0.75
OD_AXIS: 101
OD_CYLINDER: -1.50
OS_CYLINDER: -1.50
OS_SPHERE: +1.50
OS_AXIS: 59

## 2018-08-07 ASSESSMENT — CONFRONTATIONAL VISUAL FIELD TEST (CVF)
OS_FINDINGS: FULL
OD_FINDINGS: FULL

## 2018-08-07 ASSESSMENT — SPHEQUIV_DERIVED
OD_SPHEQUIV: 0
OS_SPHEQUIV: 0.75

## 2018-08-07 ASSESSMENT — DRY EYES - PHYSICIAN NOTES
OD_GENERALCOMMENTS: KSICCA
OS_GENERALCOMMENTS: KSICCA

## 2018-08-07 ASSESSMENT — VISUAL ACUITY
OS_BCVA: 20/25-1
OD_BCVA: 20/25-1

## 2018-09-06 ENCOUNTER — OPTICAL OFFICE (OUTPATIENT)
Dept: URBAN - NONMETROPOLITAN AREA CLINIC 4 | Facility: CLINIC | Age: 78
Setting detail: OPHTHALMOLOGY
End: 2018-09-06

## 2018-09-06 ENCOUNTER — DOCTOR'S OFFICE (OUTPATIENT)
Dept: URBAN - NONMETROPOLITAN AREA CLINIC 1 | Facility: CLINIC | Age: 78
Setting detail: OPHTHALMOLOGY
End: 2018-09-06
Payer: COMMERCIAL

## 2018-09-06 DIAGNOSIS — Z96.1: ICD-10-CM

## 2018-09-06 DIAGNOSIS — H52.223: ICD-10-CM

## 2018-09-06 DIAGNOSIS — H40.013: ICD-10-CM

## 2018-09-06 DIAGNOSIS — H52.4: ICD-10-CM

## 2018-09-06 DIAGNOSIS — H04.121: ICD-10-CM

## 2018-09-06 DIAGNOSIS — H04.122: ICD-10-CM

## 2018-09-06 PROCEDURE — V2762 POLARIZATION, ANY LENS: HCPCS | Performed by: OPTOMETRIST

## 2018-09-06 PROCEDURE — 92015 DETERMINE REFRACTIVE STATE: CPT | Performed by: OPTOMETRIST

## 2018-09-06 PROCEDURE — V2020 VISION SVCS FRAMES PURCHASES: HCPCS | Performed by: OPTOMETRIST

## 2018-09-06 PROCEDURE — V2781 PROGRESSIVE LENS PER LENS: HCPCS | Performed by: OPTOMETRIST

## 2018-09-06 PROCEDURE — 92012 INTRM OPH EXAM EST PATIENT: CPT | Performed by: OPTOMETRIST

## 2018-09-06 ASSESSMENT — DRY EYES - PHYSICIAN NOTES
OD_GENERALCOMMENTS: KSICCA
OS_GENERALCOMMENTS: KSICCA

## 2018-09-06 ASSESSMENT — REFRACTION_MANIFEST
OS_SPHERE: +1.50
OS_VA3: 20/
OS_VA2: 20/
OD_VA1: 20/20
OD_ADD: +2.75
OD_AXIS: 105
OS_CYLINDER: -1.25
OS_ADD: +2.75
OS_VA1: 20/
OS_VA3: 20/
OU_VA: 20/
OD_VA2: 20/
OD_VA3: 20/
OD_VA1: 20/
OD_VA3: 20/
OD_VA2: 20/
OD_CYLINDER: -1.75
OU_VA: 20/
OS_VA2: 20/
OS_VA1: 20/20
OD_SPHERE: +0.75
OS_AXIS: 65

## 2018-09-06 ASSESSMENT — REFRACTION_CURRENTRX
OS_ADD: +1.00
OS_OVR_VA: 20/
OS_OVR_VA: 20/
OD_OVR_VA: 20/
OD_CYLINDER: -1.50
OD_ADD: +1.75
OS_OVR_VA: 20/
OS_CYLINDER: -1.00
OS_AXIS: 091
OS_VPRISM_DIRECTION: PROGS
OD_SPHERE: PLANO
OD_OVR_VA: 20/
OD_OVR_VA: 20/
OD_AXIS: 090
OS_SPHERE: +1.75
OD_VPRISM_DIRECTION: PROGS

## 2018-09-06 ASSESSMENT — KERATOMETRY
OS_K1POWER_DIOPTERS: +2.25
OD_K2POWER_DIOPTERS: -1.50
OD_AXISANGLE_DEGREES: 104
OD_K1POWER_DIOPTERS: +.75
OS_AXISANGLE_DEGREES: 071
OS_K2POWER_DIOPTERS: -1.75

## 2018-09-06 ASSESSMENT — AXIALLENGTH_DERIVED
OD_AL: 74.4462
OS_AL: 69.17
OD_AL: 74.93
OS_AL: 69.5895

## 2018-09-06 ASSESSMENT — REFRACTION_AUTOREFRACTION
OS_CYLINDER: -1.50
OD_AXIS: 101
OS_SPHERE: +1.50
OS_AXIS: 59
OD_SPHERE: +0.75
OD_CYLINDER: -1.50

## 2018-09-06 ASSESSMENT — SPHEQUIV_DERIVED
OD_SPHEQUIV: 0
OS_SPHEQUIV: 0.75
OS_SPHEQUIV: 0.875
OD_SPHEQUIV: -0.125

## 2018-09-06 ASSESSMENT — LID POSITION - DERMATOCHALASIS
OS_DERMATOCHALASIS: LUL 2+
OD_DERMATOCHALASIS: RUL 2+ 3+

## 2018-09-06 ASSESSMENT — VISUAL ACUITY
OD_BCVA: 20/40
OS_BCVA: 20/40

## 2018-10-04 ENCOUNTER — DOCTOR'S OFFICE (OUTPATIENT)
Dept: URBAN - NONMETROPOLITAN AREA CLINIC 1 | Facility: CLINIC | Age: 78
Setting detail: OPHTHALMOLOGY
End: 2018-10-04
Payer: COMMERCIAL

## 2018-10-04 DIAGNOSIS — H35.3112: ICD-10-CM

## 2018-10-04 DIAGNOSIS — H40.013: ICD-10-CM

## 2018-10-04 DIAGNOSIS — H35.3131: ICD-10-CM

## 2018-10-04 DIAGNOSIS — H04.121: ICD-10-CM

## 2018-10-04 DIAGNOSIS — H04.122: ICD-10-CM

## 2018-10-04 DIAGNOSIS — H35.373: ICD-10-CM

## 2018-10-04 DIAGNOSIS — H43.811: ICD-10-CM

## 2018-10-04 DIAGNOSIS — H43.812: ICD-10-CM

## 2018-10-04 DIAGNOSIS — H43.813: ICD-10-CM

## 2018-10-04 PROCEDURE — 83861 MICROFLUID ANALY TEARS: CPT | Performed by: OPHTHALMOLOGY

## 2018-10-04 PROCEDURE — 92134 CPTRZ OPH DX IMG PST SGM RTA: CPT | Performed by: OPHTHALMOLOGY

## 2018-10-04 PROCEDURE — 92226 OPHTHALMOSCOPY EXT SUBSEQUENT: CPT | Performed by: OPHTHALMOLOGY

## 2018-10-04 PROCEDURE — 92014 COMPRE OPH EXAM EST PT 1/>: CPT | Performed by: OPHTHALMOLOGY

## 2018-10-04 ASSESSMENT — REFRACTION_CURRENTRX
OS_OVR_VA: 20/
OD_ADD: +1.75
OS_OVR_VA: 20/
OD_SPHERE: PLANO
OS_VPRISM_DIRECTION: PROGS
OS_AXIS: 091
OD_CYLINDER: -1.50
OS_CYLINDER: -1.00
OD_OVR_VA: 20/
OD_VPRISM_DIRECTION: PROGS
OS_SPHERE: +1.75
OD_AXIS: 090
OD_OVR_VA: 20/
OD_OVR_VA: 20/
OS_ADD: +1.00
OS_OVR_VA: 20/

## 2018-10-04 ASSESSMENT — REFRACTION_MANIFEST
OS_ADD: +2.75
OS_AXIS: 65
OS_VA1: 20/20
OS_CYLINDER: -1.25
OD_VA1: 20/20
OD_VA1: 20/
OS_VA3: 20/
OU_VA: 20/
OD_CYLINDER: -1.75
OD_VA3: 20/
OS_VA1: 20/
OD_VA3: 20/
OU_VA: 20/
OS_VA2: 20/
OS_VA2: 20/
OS_SPHERE: +1.50
OD_ADD: +2.75
OD_VA2: 20/
OD_VA2: 20/
OD_SPHERE: +0.75
OD_AXIS: 105
OS_VA3: 20/

## 2018-10-04 ASSESSMENT — REFRACTION_AUTOREFRACTION
OD_CYLINDER: -1.50
OD_SPHERE: +0.75
OD_AXIS: 101
OS_CYLINDER: -1.50
OS_SPHERE: +1.50
OS_AXIS: 59

## 2018-10-04 ASSESSMENT — CONFRONTATIONAL VISUAL FIELD TEST (CVF)
OD_FINDINGS: FULL
OS_FINDINGS: FULL

## 2018-10-04 ASSESSMENT — LID POSITION - DERMATOCHALASIS
OS_DERMATOCHALASIS: LUL 2+
OD_DERMATOCHALASIS: RUL 2+ 3+

## 2018-10-04 ASSESSMENT — SPHEQUIV_DERIVED
OS_SPHEQUIV: 0.75
OS_SPHEQUIV: 0.875
OD_SPHEQUIV: 0
OD_SPHEQUIV: -0.125

## 2018-10-04 ASSESSMENT — VISUAL ACUITY
OD_BCVA: 20/25-1
OS_BCVA: 20/25-1

## 2018-10-04 ASSESSMENT — DRY EYES - PHYSICIAN NOTES
OD_GENERALCOMMENTS: KSICCA
OS_GENERALCOMMENTS: KSICCA

## 2018-10-29 ENCOUNTER — DOCTOR'S OFFICE (OUTPATIENT)
Dept: URBAN - NONMETROPOLITAN AREA CLINIC 1 | Facility: CLINIC | Age: 78
Setting detail: OPHTHALMOLOGY
End: 2018-10-29
Payer: COMMERCIAL

## 2018-10-29 DIAGNOSIS — H02.403: ICD-10-CM

## 2018-10-29 DIAGNOSIS — H02.423: ICD-10-CM

## 2018-10-29 DIAGNOSIS — H02.831: ICD-10-CM

## 2018-10-29 DIAGNOSIS — H02.413: ICD-10-CM

## 2018-10-29 DIAGNOSIS — Z96.1: ICD-10-CM

## 2018-10-29 DIAGNOSIS — H02.834: ICD-10-CM

## 2018-10-29 DIAGNOSIS — H04.123: ICD-10-CM

## 2018-10-29 DIAGNOSIS — H02.433: ICD-10-CM

## 2018-10-29 PROCEDURE — 92012 INTRM OPH EXAM EST PATIENT: CPT | Performed by: OPHTHALMOLOGY

## 2018-10-29 ASSESSMENT — REFRACTION_MANIFEST
OS_VA3: 20/
OS_AXIS: 65
OD_AXIS: 105
OS_SPHERE: +1.50
OD_VA1: 20/
OD_VA2: 20/
OU_VA: 20/
OS_ADD: +2.75
OS_VA2: 20/
OD_VA3: 20/
OS_VA1: 20/
OS_VA2: 20/
OD_ADD: +2.75
OD_CYLINDER: -1.75
OD_VA2: 20/
OS_VA3: 20/
OD_VA3: 20/
OD_VA1: 20/20
OS_CYLINDER: -1.25
OU_VA: 20/
OS_VA1: 20/20
OD_SPHERE: +0.75

## 2018-10-29 ASSESSMENT — SPHEQUIV_DERIVED
OS_SPHEQUIV: 0.875
OS_SPHEQUIV: 0.75
OD_SPHEQUIV: -0.125
OD_SPHEQUIV: 0

## 2018-10-29 ASSESSMENT — REFRACTION_CURRENTRX
OD_ADD: +1.75
OD_OVR_VA: 20/
OS_SPHERE: +1.75
OS_CYLINDER: -1.00
OD_AXIS: 090
OD_SPHERE: PLANO
OS_OVR_VA: 20/
OS_ADD: +1.00
OD_VPRISM_DIRECTION: PROGS
OD_OVR_VA: 20/
OS_AXIS: 091
OS_OVR_VA: 20/
OS_VPRISM_DIRECTION: PROGS
OS_OVR_VA: 20/
OD_CYLINDER: -1.50
OD_OVR_VA: 20/

## 2018-10-29 ASSESSMENT — AXIALLENGTH_DERIVED
OS_AL: 70.3729
OD_AL: 75.37
OS_AL: 70.81
OD_AL: 75.8723

## 2018-10-29 ASSESSMENT — KERATOMETRY
OS_K1POWER_DIOPTERS: +1.00
OD_K1POWER_DIOPTERS: +0.75
OS_AXISANGLE_DEGREES: 064
OS_K2POWER_DIOPTERS: -1.25
OD_AXISANGLE_DEGREES: 118
OD_K2POWER_DIOPTERS: -2.00

## 2018-10-29 ASSESSMENT — LID POSITION - PTOSIS
OS_PTOSIS: 1+
OD_PTOSIS: 1+

## 2018-10-29 ASSESSMENT — REFRACTION_AUTOREFRACTION
OS_CYLINDER: -1.50
OD_SPHERE: +0.75
OS_AXIS: 59
OD_CYLINDER: -1.50
OD_AXIS: 101
OS_SPHERE: +1.50

## 2018-10-29 ASSESSMENT — CONFRONTATIONAL VISUAL FIELD TEST (CVF)
OD_FINDINGS: FULL
OS_FINDINGS: FULL

## 2018-10-29 ASSESSMENT — LID POSITION - COMMENTS
OD_COMMENTS: MRD1: 2/2
OS_COMMENTS: MRD1: 2/2

## 2018-10-29 ASSESSMENT — DRY EYES - PHYSICIAN NOTES
OD_GENERALCOMMENTS: KSICCA
OS_GENERALCOMMENTS: KSICCA

## 2018-10-29 ASSESSMENT — LID POSITION - DERMATOCHALASIS
OD_DERMATOCHALASIS: RUL 2+
OS_DERMATOCHALASIS: LUL 2+

## 2018-10-29 ASSESSMENT — VISUAL ACUITY
OS_BCVA: 20/30-2
OD_BCVA: 20/30-2

## 2018-11-12 ENCOUNTER — DOCTOR'S OFFICE (OUTPATIENT)
Dept: URBAN - NONMETROPOLITAN AREA CLINIC 1 | Facility: CLINIC | Age: 78
Setting detail: OPHTHALMOLOGY
End: 2018-11-12
Payer: COMMERCIAL

## 2018-11-12 DIAGNOSIS — H04.121: ICD-10-CM

## 2018-11-12 DIAGNOSIS — H04.122: ICD-10-CM

## 2018-11-12 DIAGNOSIS — H35.3131: ICD-10-CM

## 2018-11-12 DIAGNOSIS — H35.3112: ICD-10-CM

## 2018-11-12 DIAGNOSIS — H43.812: ICD-10-CM

## 2018-11-12 DIAGNOSIS — H02.423: ICD-10-CM

## 2018-11-12 DIAGNOSIS — H02.413: ICD-10-CM

## 2018-11-12 DIAGNOSIS — H02.433: ICD-10-CM

## 2018-11-12 DIAGNOSIS — H43.811: ICD-10-CM

## 2018-11-12 DIAGNOSIS — H02.403: ICD-10-CM

## 2018-11-12 DIAGNOSIS — H35.373: ICD-10-CM

## 2018-11-12 PROCEDURE — 92226 OPHTHALMOSCOPY EXT SUBSEQUENT: CPT | Performed by: OPHTHALMOLOGY

## 2018-11-12 PROCEDURE — 83861 MICROFLUID ANALY TEARS: CPT | Performed by: OPHTHALMOLOGY

## 2018-11-12 PROCEDURE — 92014 COMPRE OPH EXAM EST PT 1/>: CPT | Performed by: OPHTHALMOLOGY

## 2018-11-12 PROCEDURE — 92134 CPTRZ OPH DX IMG PST SGM RTA: CPT | Performed by: OPHTHALMOLOGY

## 2018-11-12 ASSESSMENT — REFRACTION_MANIFEST
OD_VA3: 20/
OD_VA2: 20/
OU_VA: 20/
OD_ADD: +2.75
OD_CYLINDER: -1.75
OS_ADD: +2.75
OU_VA: 20/
OD_VA1: 20/20
OS_VA1: 20/20
OS_VA3: 20/
OS_CYLINDER: -1.25
OS_VA1: 20/
OD_VA1: 20/
OD_VA2: 20/
OD_AXIS: 105
OS_VA3: 20/
OS_VA2: 20/
OD_VA3: 20/
OS_VA2: 20/
OS_SPHERE: +1.50
OD_SPHERE: +0.75
OS_AXIS: 65

## 2018-11-12 ASSESSMENT — REFRACTION_CURRENTRX
OD_OVR_VA: 20/
OS_OVR_VA: 20/
OD_AXIS: 090
OS_SPHERE: +1.75
OS_CYLINDER: -1.00
OS_ADD: +1.00
OD_SPHERE: PLANO
OD_OVR_VA: 20/
OD_VPRISM_DIRECTION: PROGS
OD_CYLINDER: -1.50
OS_VPRISM_DIRECTION: PROGS
OS_OVR_VA: 20/
OD_ADD: +1.75
OS_AXIS: 091
OD_OVR_VA: 20/
OS_OVR_VA: 20/

## 2018-11-12 ASSESSMENT — REFRACTION_AUTOREFRACTION
OD_AXIS: 101
OS_CYLINDER: -1.50
OD_SPHERE: +0.75
OS_AXIS: 59
OD_CYLINDER: -1.50
OS_SPHERE: +1.50

## 2018-11-12 ASSESSMENT — SPHEQUIV_DERIVED
OD_SPHEQUIV: 0
OS_SPHEQUIV: 0.875
OD_SPHEQUIV: -0.125
OS_SPHEQUIV: 0.75

## 2018-11-12 ASSESSMENT — CONFRONTATIONAL VISUAL FIELD TEST (CVF)
OS_FINDINGS: FULL
OD_FINDINGS: FULL

## 2018-11-12 ASSESSMENT — LID POSITION - PTOSIS
OS_PTOSIS: 1+
OD_PTOSIS: 1+

## 2018-11-12 ASSESSMENT — LID POSITION - COMMENTS
OS_COMMENTS: MRD1: 2/2
OD_COMMENTS: MRD1: 2/2

## 2018-11-12 ASSESSMENT — LID POSITION - DERMATOCHALASIS
OD_DERMATOCHALASIS: RUL 2+
OS_DERMATOCHALASIS: LUL 2+

## 2018-11-12 ASSESSMENT — VISUAL ACUITY
OD_BCVA: 20/25
OS_BCVA: 20/30

## 2018-11-12 ASSESSMENT — DRY EYES - PHYSICIAN NOTES
OD_GENERALCOMMENTS: KSICCA
OS_GENERALCOMMENTS: KSICCA

## 2018-11-29 ENCOUNTER — DOCTOR'S OFFICE (OUTPATIENT)
Dept: URBAN - NONMETROPOLITAN AREA CLINIC 1 | Facility: CLINIC | Age: 78
Setting detail: OPHTHALMOLOGY
End: 2018-11-29
Payer: COMMERCIAL

## 2018-11-29 DIAGNOSIS — H35.3131: ICD-10-CM

## 2018-11-29 DIAGNOSIS — E11.3293: ICD-10-CM

## 2018-11-29 DIAGNOSIS — H35.373: ICD-10-CM

## 2018-11-29 DIAGNOSIS — H04.123: ICD-10-CM

## 2018-11-29 DIAGNOSIS — H40.013: ICD-10-CM

## 2018-11-29 PROCEDURE — 92235 FLUORESCEIN ANGRPH MLTIFRAME: CPT | Performed by: OPHTHALMOLOGY

## 2018-11-29 PROCEDURE — 92250 FUNDUS PHOTOGRAPHY W/I&R: CPT | Performed by: OPHTHALMOLOGY

## 2018-11-29 PROCEDURE — 92014 COMPRE OPH EXAM EST PT 1/>: CPT | Performed by: OPHTHALMOLOGY

## 2018-11-29 ASSESSMENT — REFRACTION_AUTOREFRACTION
OD_AXIS: 101
OS_CYLINDER: -1.50
OD_SPHERE: +0.75
OD_CYLINDER: -1.50
OS_AXIS: 59
OS_SPHERE: +1.50

## 2018-11-29 ASSESSMENT — REFRACTION_CURRENTRX
OS_CYLINDER: -1.00
OS_AXIS: 091
OD_OVR_VA: 20/
OD_AXIS: 090
OD_ADD: +1.75
OD_VPRISM_DIRECTION: PROGS
OS_ADD: +1.00
OD_OVR_VA: 20/
OS_VPRISM_DIRECTION: PROGS
OD_OVR_VA: 20/
OS_OVR_VA: 20/
OS_SPHERE: +1.75
OD_CYLINDER: -1.50
OS_OVR_VA: 20/
OS_OVR_VA: 20/
OD_SPHERE: PLANO

## 2018-11-29 ASSESSMENT — SPHEQUIV_DERIVED
OS_SPHEQUIV: 0.75
OD_SPHEQUIV: -0.125
OD_SPHEQUIV: 0
OS_SPHEQUIV: 0.875

## 2018-11-29 ASSESSMENT — REFRACTION_MANIFEST
OD_VA1: 20/20
OS_VA2: 20/
OS_VA2: 20/
OS_VA3: 20/
OD_AXIS: 105
OS_SPHERE: +1.50
OD_VA2: 20/
OS_VA1: 20/
OS_ADD: +2.75
OU_VA: 20/
OD_VA1: 20/
OD_ADD: +2.75
OS_VA1: 20/20
OD_CYLINDER: -1.75
OS_VA3: 20/
OS_AXIS: 65
OU_VA: 20/
OD_VA3: 20/
OD_SPHERE: +0.75
OD_VA2: 20/
OS_CYLINDER: -1.25
OD_VA3: 20/

## 2018-11-29 ASSESSMENT — LID POSITION - PTOSIS
OS_PTOSIS: 1+
OD_PTOSIS: 1+

## 2018-11-29 ASSESSMENT — DRY EYES - PHYSICIAN NOTES
OD_GENERALCOMMENTS: KSICCA
OS_GENERALCOMMENTS: KSICCA

## 2018-11-29 ASSESSMENT — VISUAL ACUITY
OS_BCVA: 20/30
OD_BCVA: 20/25

## 2018-11-29 ASSESSMENT — LID POSITION - DERMATOCHALASIS
OS_DERMATOCHALASIS: LUL 2+
OD_DERMATOCHALASIS: RUL 2+

## 2018-11-29 ASSESSMENT — LID POSITION - COMMENTS
OD_COMMENTS: MRD1: 2/2
OS_COMMENTS: MRD1: 2/2

## 2018-11-29 ASSESSMENT — CONFRONTATIONAL VISUAL FIELD TEST (CVF)
OS_FINDINGS: FULL
OD_FINDINGS: FULL

## 2019-01-10 ENCOUNTER — DOCTOR'S OFFICE (OUTPATIENT)
Dept: URBAN - NONMETROPOLITAN AREA CLINIC 1 | Facility: CLINIC | Age: 79
Setting detail: OPHTHALMOLOGY
End: 2019-01-10
Payer: COMMERCIAL

## 2019-01-10 DIAGNOSIS — E11.3293: ICD-10-CM

## 2019-01-10 DIAGNOSIS — H43.813: ICD-10-CM

## 2019-01-10 DIAGNOSIS — H04.122: ICD-10-CM

## 2019-01-10 DIAGNOSIS — H04.123: ICD-10-CM

## 2019-01-10 DIAGNOSIS — H35.373: ICD-10-CM

## 2019-01-10 DIAGNOSIS — H04.121: ICD-10-CM

## 2019-01-10 PROCEDURE — 83861 MICROFLUID ANALY TEARS: CPT | Performed by: OPHTHALMOLOGY

## 2019-01-10 PROCEDURE — 92014 COMPRE OPH EXAM EST PT 1/>: CPT | Performed by: OPHTHALMOLOGY

## 2019-01-10 PROCEDURE — 92134 CPTRZ OPH DX IMG PST SGM RTA: CPT | Performed by: OPHTHALMOLOGY

## 2019-01-10 ASSESSMENT — REFRACTION_MANIFEST
OD_VA2: 20/
OD_CYLINDER: -1.75
OS_VA1: 20/
OS_VA2: 20/
OD_SPHERE: +0.75
OD_VA3: 20/
OS_VA3: 20/
OU_VA: 20/
OS_CYLINDER: -1.25
OS_VA1: 20/20
OD_AXIS: 105
OS_ADD: +2.75
OS_SPHERE: +1.50
OD_ADD: +2.75
OD_VA1: 20/
OS_VA3: 20/
OD_VA2: 20/
OD_VA1: 20/20
OS_AXIS: 65
OU_VA: 20/
OD_VA3: 20/
OS_VA2: 20/

## 2019-01-10 ASSESSMENT — LID POSITION - COMMENTS
OD_COMMENTS: MRD1: 2/2
OS_COMMENTS: MRD1: 2/2

## 2019-01-10 ASSESSMENT — REFRACTION_AUTOREFRACTION
OD_AXIS: 101
OS_AXIS: 59
OS_CYLINDER: -1.50
OD_SPHERE: +0.75
OD_CYLINDER: -1.50
OS_SPHERE: +1.50

## 2019-01-10 ASSESSMENT — REFRACTION_CURRENTRX
OS_AXIS: 091
OD_SPHERE: PLANO
OD_ADD: +1.75
OD_OVR_VA: 20/
OD_OVR_VA: 20/
OS_OVR_VA: 20/
OS_VPRISM_DIRECTION: PROGS
OD_CYLINDER: -1.50
OS_ADD: +1.00
OD_AXIS: 090
OD_OVR_VA: 20/
OS_SPHERE: +1.75
OD_VPRISM_DIRECTION: PROGS
OS_OVR_VA: 20/
OS_OVR_VA: 20/
OS_CYLINDER: -1.00

## 2019-01-10 ASSESSMENT — DRY EYES - PHYSICIAN NOTES
OD_GENERALCOMMENTS: KSICCA
OS_GENERALCOMMENTS: KSICCA

## 2019-01-10 ASSESSMENT — LID POSITION - DERMATOCHALASIS
OD_DERMATOCHALASIS: RUL 2+
OS_DERMATOCHALASIS: LUL 2+

## 2019-01-10 ASSESSMENT — SPHEQUIV_DERIVED
OD_SPHEQUIV: 0
OS_SPHEQUIV: 0.875
OS_SPHEQUIV: 0.75
OD_SPHEQUIV: -0.125

## 2019-01-10 ASSESSMENT — CONFRONTATIONAL VISUAL FIELD TEST (CVF)
OS_FINDINGS: FULL
OD_FINDINGS: FULL

## 2019-01-10 ASSESSMENT — VISUAL ACUITY
OS_BCVA: 20/30+2
OD_BCVA: 20/25

## 2019-01-10 ASSESSMENT — LID POSITION - PTOSIS
OD_PTOSIS: 1+
OS_PTOSIS: 1+

## 2019-02-06 ENCOUNTER — DOCTOR'S OFFICE (OUTPATIENT)
Dept: URBAN - NONMETROPOLITAN AREA CLINIC 1 | Facility: CLINIC | Age: 79
Setting detail: OPHTHALMOLOGY
End: 2019-02-06
Payer: COMMERCIAL

## 2019-02-06 DIAGNOSIS — H35.373: ICD-10-CM

## 2019-02-06 DIAGNOSIS — H35.3112: ICD-10-CM

## 2019-02-06 DIAGNOSIS — H02.413: ICD-10-CM

## 2019-02-06 DIAGNOSIS — H35.3131: ICD-10-CM

## 2019-02-06 DIAGNOSIS — H02.831: ICD-10-CM

## 2019-02-06 DIAGNOSIS — H40.013: ICD-10-CM

## 2019-02-06 DIAGNOSIS — H02.834: ICD-10-CM

## 2019-02-06 DIAGNOSIS — H02.403: ICD-10-CM

## 2019-02-06 DIAGNOSIS — H04.121: ICD-10-CM

## 2019-02-06 DIAGNOSIS — H02.423: ICD-10-CM

## 2019-02-06 DIAGNOSIS — H02.433: ICD-10-CM

## 2019-02-06 DIAGNOSIS — E11.3293: ICD-10-CM

## 2019-02-06 PROBLEM — H53.122: Status: RESOLVED | Noted: 2018-08-06 | Resolved: 2019-02-06

## 2019-02-06 PROCEDURE — 76514 ECHO EXAM OF EYE THICKNESS: CPT | Performed by: OPHTHALMOLOGY

## 2019-02-06 PROCEDURE — 92133 CPTRZD OPH DX IMG PST SGM ON: CPT | Performed by: OPHTHALMOLOGY

## 2019-02-06 PROCEDURE — 92014 COMPRE OPH EXAM EST PT 1/>: CPT | Performed by: OPHTHALMOLOGY

## 2019-02-06 ASSESSMENT — REFRACTION_MANIFEST
OD_VA3: 20/
OS_VA3: 20/
OD_AXIS: 105
OU_VA: 20/
OD_VA1: 20/20
OD_VA2: 20/
OS_VA1: 20/
OD_VA2: 20/
OS_CYLINDER: -1.25
OU_VA: 20/
OS_VA2: 20/
OD_VA1: 20/
OD_VA3: 20/
OS_VA3: 20/
OS_VA2: 20/
OD_SPHERE: +0.75
OS_ADD: +2.75
OS_AXIS: 65
OS_VA1: 20/20
OD_ADD: +2.75
OD_CYLINDER: -1.75
OS_SPHERE: +1.50

## 2019-02-06 ASSESSMENT — KERATOMETRY
OD_K1POWER_DIOPTERS: +0.75
OD_K2POWER_DIOPTERS: -2.00
OD_AXISANGLE_DEGREES: 118
OS_AXISANGLE_DEGREES: 064
OS_K1POWER_DIOPTERS: +1.00
OS_K2POWER_DIOPTERS: -1.25

## 2019-02-06 ASSESSMENT — REFRACTION_CURRENTRX
OD_SPHERE: +0.50
OS_AXIS: 074
OS_SPHERE: +1.25
OS_ADD: +2.75
OS_VPRISM_DIRECTION: PROGS
OD_VPRISM_DIRECTION: PROGS
OS_OVR_VA: 20/
OD_CYLINDER: -1.75
OD_OVR_VA: 20/
OS_OVR_VA: 20/
OD_ADD: +2.75
OS_OVR_VA: 20/
OD_OVR_VA: 20/
OD_AXIS: 104
OS_CYLINDER: -1.25
OD_OVR_VA: 20/

## 2019-02-06 ASSESSMENT — REFRACTION_AUTOREFRACTION
OS_SPHERE: +1.25
OD_AXIS: 104
OS_AXIS: 074
OS_CYLINDER: -1.25
OD_CYLINDER: -1.75
OD_SPHERE: +0.25

## 2019-02-06 ASSESSMENT — SPHEQUIV_DERIVED
OS_SPHEQUIV: 0.625
OD_SPHEQUIV: -0.125
OS_SPHEQUIV: 0.875
OD_SPHEQUIV: -0.625

## 2019-02-06 ASSESSMENT — LID POSITION - DERMATOCHALASIS
OS_DERMATOCHALASIS: LUL 2+
OD_DERMATOCHALASIS: RUL 2+

## 2019-02-06 ASSESSMENT — DRY EYES - PHYSICIAN NOTES
OD_GENERALCOMMENTS: KSICCA
OS_GENERALCOMMENTS: KSICCA

## 2019-02-06 ASSESSMENT — AXIALLENGTH_DERIVED
OD_AL: 75.8723
OD_AL: 77.94
OS_AL: 71.25
OS_AL: 70.3729

## 2019-02-06 ASSESSMENT — PACHYMETRY
OD_CT_UM: 575
OS_CT_CORRECTION: -1
OD_CT_CORRECTION: -2
OS_CT_UM: 567

## 2019-02-06 ASSESSMENT — LID POSITION - COMMENTS
OS_COMMENTS: MRD1: 2/2
OD_COMMENTS: MRD1: 2/2

## 2019-02-06 ASSESSMENT — LID POSITION - PTOSIS
OS_PTOSIS: 1+
OD_PTOSIS: 1+

## 2019-02-06 ASSESSMENT — VISUAL ACUITY
OS_BCVA: 20/30+2
OD_BCVA: 20/30+2

## 2019-02-06 ASSESSMENT — CONFRONTATIONAL VISUAL FIELD TEST (CVF)
OD_FINDINGS: FULL
OS_FINDINGS: FULL

## 2019-02-14 ENCOUNTER — DOCTOR'S OFFICE (OUTPATIENT)
Dept: URBAN - NONMETROPOLITAN AREA CLINIC 1 | Facility: CLINIC | Age: 79
Setting detail: OPHTHALMOLOGY
End: 2019-02-14
Payer: COMMERCIAL

## 2019-02-14 DIAGNOSIS — H43.812: ICD-10-CM

## 2019-02-14 DIAGNOSIS — H43.811: ICD-10-CM

## 2019-02-14 DIAGNOSIS — E11.3292: ICD-10-CM

## 2019-02-14 DIAGNOSIS — E11.3211: ICD-10-CM

## 2019-02-14 DIAGNOSIS — H35.373: ICD-10-CM

## 2019-02-14 DIAGNOSIS — H35.3131: ICD-10-CM

## 2019-02-14 DIAGNOSIS — H43.813: ICD-10-CM

## 2019-02-14 PROCEDURE — 92014 COMPRE OPH EXAM EST PT 1/>: CPT | Performed by: OPHTHALMOLOGY

## 2019-02-14 PROCEDURE — 92226 OPHTHALMOSCOPY EXT SUBSEQUENT: CPT | Performed by: OPHTHALMOLOGY

## 2019-02-14 PROCEDURE — 92134 CPTRZ OPH DX IMG PST SGM RTA: CPT | Performed by: OPHTHALMOLOGY

## 2019-02-14 ASSESSMENT — REFRACTION_MANIFEST
OD_VA2: 20/
OS_CYLINDER: -1.25
OS_VA3: 20/
OD_VA1: 20/20
OS_AXIS: 65
OD_SPHERE: +0.75
OU_VA: 20/
OD_VA2: 20/
OS_VA2: 20/
OS_VA1: 20/
OS_VA2: 20/
OU_VA: 20/
OS_ADD: +2.75
OD_CYLINDER: -1.75
OD_VA1: 20/
OD_VA3: 20/
OD_VA3: 20/
OS_VA1: 20/20
OS_SPHERE: +1.50
OD_AXIS: 105
OD_ADD: +2.75
OS_VA3: 20/

## 2019-02-14 ASSESSMENT — CONFRONTATIONAL VISUAL FIELD TEST (CVF)
OS_FINDINGS: FULL
OD_FINDINGS: FULL

## 2019-02-14 ASSESSMENT — REFRACTION_CURRENTRX
OD_AXIS: 104
OD_OVR_VA: 20/
OD_SPHERE: +0.50
OS_ADD: +2.75
OS_OVR_VA: 20/
OS_CYLINDER: -1.25
OD_VPRISM_DIRECTION: PROGS
OS_OVR_VA: 20/
OD_OVR_VA: 20/
OD_CYLINDER: -1.75
OS_OVR_VA: 20/
OD_ADD: +2.75
OD_OVR_VA: 20/
OS_SPHERE: +1.25
OS_AXIS: 074
OS_VPRISM_DIRECTION: PROGS

## 2019-02-14 ASSESSMENT — SPHEQUIV_DERIVED
OS_SPHEQUIV: 0.875
OS_SPHEQUIV: 0.625
OD_SPHEQUIV: -0.125
OD_SPHEQUIV: -0.625

## 2019-02-14 ASSESSMENT — LID POSITION - PTOSIS
OD_PTOSIS: 1+
OS_PTOSIS: 1+

## 2019-02-14 ASSESSMENT — LID POSITION - COMMENTS
OD_COMMENTS: MRD1: 2/2
OS_COMMENTS: MRD1: 2/2

## 2019-02-14 ASSESSMENT — REFRACTION_AUTOREFRACTION
OD_CYLINDER: -1.75
OD_AXIS: 104
OD_SPHERE: +0.25
OS_AXIS: 074
OS_CYLINDER: -1.25
OS_SPHERE: +1.25

## 2019-02-14 ASSESSMENT — DRY EYES - PHYSICIAN NOTES
OD_GENERALCOMMENTS: KSICCA
OS_GENERALCOMMENTS: KSICCA

## 2019-02-14 ASSESSMENT — VISUAL ACUITY
OS_BCVA: 20/30+2
OD_BCVA: 20/30+2

## 2019-02-14 ASSESSMENT — LID POSITION - DERMATOCHALASIS
OD_DERMATOCHALASIS: RUL 2+
OS_DERMATOCHALASIS: LUL 2+

## 2019-03-14 ENCOUNTER — RX ONLY (RX ONLY)
Age: 79
End: 2019-03-14

## 2019-03-14 ENCOUNTER — DOCTOR'S OFFICE (OUTPATIENT)
Dept: URBAN - NONMETROPOLITAN AREA CLINIC 1 | Facility: CLINIC | Age: 79
Setting detail: OPHTHALMOLOGY
End: 2019-03-14
Payer: COMMERCIAL

## 2019-03-14 DIAGNOSIS — H35.373: ICD-10-CM

## 2019-03-14 DIAGNOSIS — H43.813: ICD-10-CM

## 2019-03-14 DIAGNOSIS — E11.3292: ICD-10-CM

## 2019-03-14 DIAGNOSIS — H35.3112: ICD-10-CM

## 2019-03-14 DIAGNOSIS — H04.121: ICD-10-CM

## 2019-03-14 DIAGNOSIS — H04.122: ICD-10-CM

## 2019-03-14 DIAGNOSIS — E11.3211: ICD-10-CM

## 2019-03-14 PROCEDURE — 83861 MICROFLUID ANALY TEARS: CPT | Performed by: OPHTHALMOLOGY

## 2019-03-14 PROCEDURE — 92014 COMPRE OPH EXAM EST PT 1/>: CPT | Performed by: OPHTHALMOLOGY

## 2019-03-14 PROCEDURE — 92134 CPTRZ OPH DX IMG PST SGM RTA: CPT | Performed by: OPHTHALMOLOGY

## 2019-03-14 ASSESSMENT — LID POSITION - COMMENTS
OS_COMMENTS: MRD1: 2/2
OD_COMMENTS: MRD1: 2/2

## 2019-03-14 ASSESSMENT — VISUAL ACUITY
OS_BCVA: 20/20-2
OD_BCVA: 20/25

## 2019-03-14 ASSESSMENT — DRY EYES - PHYSICIAN NOTES
OD_GENERALCOMMENTS: KSICCA
OS_GENERALCOMMENTS: KSICCA

## 2019-03-14 ASSESSMENT — REFRACTION_MANIFEST
OD_VA1: 20/
OD_AXIS: 105
OS_SPHERE: +1.50
OU_VA: 20/
OD_SPHERE: +0.75
OS_CYLINDER: -1.25
OS_VA2: 20/
OD_CYLINDER: -1.75
OD_VA2: 20/
OS_VA3: 20/
OS_ADD: +2.75
OS_AXIS: 65
OD_VA3: 20/
OD_ADD: +2.75
OD_VA2: 20/
OS_VA1: 20/
OS_VA1: 20/20
OD_VA1: 20/20
OS_VA2: 20/
OU_VA: 20/
OS_VA3: 20/
OD_VA3: 20/

## 2019-03-14 ASSESSMENT — REFRACTION_AUTOREFRACTION
OD_SPHERE: +0.25
OS_AXIS: 074
OS_CYLINDER: -1.25
OD_AXIS: 104
OS_SPHERE: +1.25
OD_CYLINDER: -1.75

## 2019-03-14 ASSESSMENT — LID POSITION - DERMATOCHALASIS
OD_DERMATOCHALASIS: RUL 2+
OS_DERMATOCHALASIS: LUL 2+

## 2019-03-14 ASSESSMENT — REFRACTION_CURRENTRX
OS_CYLINDER: -1.25
OD_OVR_VA: 20/
OS_OVR_VA: 20/
OS_VPRISM_DIRECTION: PROGS
OD_OVR_VA: 20/
OS_SPHERE: +1.25
OD_CYLINDER: -1.75
OD_SPHERE: +0.50
OD_OVR_VA: 20/
OS_ADD: +2.75
OD_AXIS: 104
OS_OVR_VA: 20/
OD_VPRISM_DIRECTION: PROGS
OS_AXIS: 074
OS_OVR_VA: 20/
OD_ADD: +2.75

## 2019-03-14 ASSESSMENT — CONFRONTATIONAL VISUAL FIELD TEST (CVF)
OS_FINDINGS: FULL
OD_FINDINGS: FULL

## 2019-03-14 ASSESSMENT — LID POSITION - PTOSIS
OD_PTOSIS: 1+
OS_PTOSIS: 1+

## 2019-03-14 ASSESSMENT — SPHEQUIV_DERIVED
OD_SPHEQUIV: -0.625
OS_SPHEQUIV: 0.625
OD_SPHEQUIV: -0.125
OS_SPHEQUIV: 0.875

## 2019-03-21 ENCOUNTER — DOCTOR'S OFFICE (OUTPATIENT)
Dept: URBAN - NONMETROPOLITAN AREA CLINIC 1 | Facility: CLINIC | Age: 79
Setting detail: OPHTHALMOLOGY
End: 2019-03-21
Payer: COMMERCIAL

## 2019-03-21 DIAGNOSIS — H35.3131: ICD-10-CM

## 2019-03-21 DIAGNOSIS — E11.3211: ICD-10-CM

## 2019-03-21 DIAGNOSIS — H35.373: ICD-10-CM

## 2019-03-21 DIAGNOSIS — H43.813: ICD-10-CM

## 2019-03-21 DIAGNOSIS — H35.3112: ICD-10-CM

## 2019-03-21 DIAGNOSIS — E11.3292: ICD-10-CM

## 2019-03-21 PROCEDURE — 92250 FUNDUS PHOTOGRAPHY W/I&R: CPT | Performed by: OPHTHALMOLOGY

## 2019-03-21 PROCEDURE — 92014 COMPRE OPH EXAM EST PT 1/>: CPT | Performed by: OPHTHALMOLOGY

## 2019-03-21 PROCEDURE — 92235 FLUORESCEIN ANGRPH MLTIFRAME: CPT | Performed by: OPHTHALMOLOGY

## 2019-03-21 ASSESSMENT — REFRACTION_MANIFEST
OS_SPHERE: +1.50
OU_VA: 20/
OD_SPHERE: +0.75
OS_VA3: 20/
OS_CYLINDER: -1.25
OS_VA2: 20/
OD_VA2: 20/
OD_VA2: 20/
OU_VA: 20/
OS_VA3: 20/
OD_VA3: 20/
OS_ADD: +2.75
OS_VA2: 20/
OD_AXIS: 105
OS_VA1: 20/
OD_ADD: +2.75
OS_VA1: 20/20
OD_VA1: 20/20
OD_VA3: 20/
OD_CYLINDER: -1.75
OS_AXIS: 65
OD_VA1: 20/

## 2019-03-21 ASSESSMENT — REFRACTION_CURRENTRX
OD_CYLINDER: -1.75
OS_SPHERE: +1.25
OS_VPRISM_DIRECTION: PROGS
OD_AXIS: 104
OD_OVR_VA: 20/
OD_OVR_VA: 20/
OS_OVR_VA: 20/
OS_OVR_VA: 20/
OD_SPHERE: +0.50
OS_OVR_VA: 20/
OS_CYLINDER: -1.25
OD_VPRISM_DIRECTION: PROGS
OD_ADD: +2.75
OS_AXIS: 074
OD_OVR_VA: 20/
OS_ADD: +2.75

## 2019-03-21 ASSESSMENT — DRY EYES - PHYSICIAN NOTES
OD_GENERALCOMMENTS: KSICCA
OS_GENERALCOMMENTS: KSICCA

## 2019-03-21 ASSESSMENT — LID POSITION - DERMATOCHALASIS
OS_DERMATOCHALASIS: LUL 2+
OD_DERMATOCHALASIS: RUL 2+

## 2019-03-21 ASSESSMENT — VISUAL ACUITY
OS_BCVA: 20/20
OD_BCVA: 20/25

## 2019-03-21 ASSESSMENT — CONFRONTATIONAL VISUAL FIELD TEST (CVF)
OD_FINDINGS: FULL
OS_FINDINGS: FULL

## 2019-03-21 ASSESSMENT — REFRACTION_AUTOREFRACTION
OD_AXIS: 104
OS_AXIS: 074
OD_SPHERE: +0.25
OS_CYLINDER: -1.25
OS_SPHERE: +1.25
OD_CYLINDER: -1.75

## 2019-03-21 ASSESSMENT — SPHEQUIV_DERIVED
OS_SPHEQUIV: 0.625
OD_SPHEQUIV: -0.125
OD_SPHEQUIV: -0.625
OS_SPHEQUIV: 0.875

## 2019-03-21 ASSESSMENT — LID POSITION - PTOSIS
OS_PTOSIS: 1+
OD_PTOSIS: 1+

## 2019-03-21 ASSESSMENT — LID POSITION - COMMENTS
OS_COMMENTS: MRD1: 2/2
OD_COMMENTS: MRD1: 2/2

## 2019-04-22 ENCOUNTER — DOCTOR'S OFFICE (OUTPATIENT)
Dept: URBAN - NONMETROPOLITAN AREA CLINIC 1 | Facility: CLINIC | Age: 79
Setting detail: OPHTHALMOLOGY
End: 2019-04-22
Payer: COMMERCIAL

## 2019-04-22 DIAGNOSIS — H35.373: ICD-10-CM

## 2019-04-22 DIAGNOSIS — H43.812: ICD-10-CM

## 2019-04-22 DIAGNOSIS — H43.813: ICD-10-CM

## 2019-04-22 DIAGNOSIS — H35.3131: ICD-10-CM

## 2019-04-22 DIAGNOSIS — H04.121: ICD-10-CM

## 2019-04-22 DIAGNOSIS — H04.122: ICD-10-CM

## 2019-04-22 DIAGNOSIS — H43.811: ICD-10-CM

## 2019-04-22 PROCEDURE — 83861 MICROFLUID ANALY TEARS: CPT | Performed by: OPHTHALMOLOGY

## 2019-04-22 PROCEDURE — 92226 OPHTHALMOSCOPY EXT SUBSEQUENT: CPT | Performed by: OPHTHALMOLOGY

## 2019-04-22 PROCEDURE — 92014 COMPRE OPH EXAM EST PT 1/>: CPT | Performed by: OPHTHALMOLOGY

## 2019-04-22 PROCEDURE — 92134 CPTRZ OPH DX IMG PST SGM RTA: CPT | Performed by: OPHTHALMOLOGY

## 2019-04-22 ASSESSMENT — LID POSITION - PTOSIS
OS_PTOSIS: 1+
OD_PTOSIS: 1+

## 2019-04-22 ASSESSMENT — REFRACTION_MANIFEST
OS_ADD: +2.75
OD_AXIS: 105
OD_VA2: 20/
OS_VA3: 20/
OS_SPHERE: +1.50
OD_VA3: 20/
OD_VA3: 20/
OS_VA1: 20/20
OS_VA1: 20/
OS_VA3: 20/
OU_VA: 20/
OS_CYLINDER: -1.25
OD_CYLINDER: -1.75
OD_VA1: 20/
OD_SPHERE: +0.75
OD_VA1: 20/20
OD_ADD: +2.75
OU_VA: 20/
OD_VA2: 20/
OS_VA2: 20/
OS_AXIS: 65
OS_VA2: 20/

## 2019-04-22 ASSESSMENT — LID POSITION - DERMATOCHALASIS
OD_DERMATOCHALASIS: RUL 2+
OS_DERMATOCHALASIS: LUL 2+

## 2019-04-22 ASSESSMENT — DRY EYES - PHYSICIAN NOTES
OS_GENERALCOMMENTS: KSICCA
OD_GENERALCOMMENTS: KSICCA

## 2019-04-22 ASSESSMENT — LID POSITION - COMMENTS
OS_COMMENTS: MRD1: 2/2
OD_COMMENTS: MRD1: 2/2

## 2019-04-22 ASSESSMENT — REFRACTION_CURRENTRX
OD_SPHERE: +0.50
OS_OVR_VA: 20/
OS_SPHERE: +1.25
OS_OVR_VA: 20/
OS_VPRISM_DIRECTION: PROGS
OD_OVR_VA: 20/
OS_ADD: +2.75
OS_OVR_VA: 20/
OD_OVR_VA: 20/
OS_CYLINDER: -1.25
OD_CYLINDER: -1.75
OD_AXIS: 104
OD_VPRISM_DIRECTION: PROGS
OS_AXIS: 074
OD_OVR_VA: 20/
OD_ADD: +2.75

## 2019-04-22 ASSESSMENT — REFRACTION_AUTOREFRACTION
OS_AXIS: 074
OS_SPHERE: +1.25
OD_CYLINDER: -1.75
OD_SPHERE: +0.25
OD_AXIS: 104
OS_CYLINDER: -1.25

## 2019-04-22 ASSESSMENT — SPHEQUIV_DERIVED
OS_SPHEQUIV: 0.875
OS_SPHEQUIV: 0.625
OD_SPHEQUIV: -0.125
OD_SPHEQUIV: -0.625

## 2019-04-22 ASSESSMENT — CONFRONTATIONAL VISUAL FIELD TEST (CVF)
OS_FINDINGS: FULL
OD_FINDINGS: FULL

## 2019-04-22 ASSESSMENT — VISUAL ACUITY
OS_BCVA: 20/20
OD_BCVA: 20/25

## 2019-06-20 ENCOUNTER — DOCTOR'S OFFICE (OUTPATIENT)
Dept: URBAN - NONMETROPOLITAN AREA CLINIC 1 | Facility: CLINIC | Age: 79
Setting detail: OPHTHALMOLOGY
End: 2019-06-20
Payer: COMMERCIAL

## 2019-06-20 DIAGNOSIS — H35.3112: ICD-10-CM

## 2019-06-20 DIAGNOSIS — H35.373: ICD-10-CM

## 2019-06-20 DIAGNOSIS — H43.813: ICD-10-CM

## 2019-06-20 DIAGNOSIS — E11.3211: ICD-10-CM

## 2019-06-20 DIAGNOSIS — E11.3292: ICD-10-CM

## 2019-06-20 DIAGNOSIS — H04.121: ICD-10-CM

## 2019-06-20 DIAGNOSIS — H04.122: ICD-10-CM

## 2019-06-20 PROCEDURE — 92134 CPTRZ OPH DX IMG PST SGM RTA: CPT | Performed by: OPHTHALMOLOGY

## 2019-06-20 PROCEDURE — 92014 COMPRE OPH EXAM EST PT 1/>: CPT | Performed by: OPHTHALMOLOGY

## 2019-06-20 PROCEDURE — 83861 MICROFLUID ANALY TEARS: CPT | Performed by: OPHTHALMOLOGY

## 2019-06-20 ASSESSMENT — REFRACTION_CURRENTRX
OD_OVR_VA: 20/
OD_OVR_VA: 20/
OD_SPHERE: +0.50
OS_VPRISM_DIRECTION: PROGS
OD_AXIS: 104
OS_OVR_VA: 20/
OS_AXIS: 074
OD_CYLINDER: -1.75
OS_ADD: +2.75
OD_ADD: +2.75
OS_CYLINDER: -1.25
OS_SPHERE: +1.25
OD_OVR_VA: 20/
OS_OVR_VA: 20/
OD_VPRISM_DIRECTION: PROGS
OS_OVR_VA: 20/

## 2019-06-20 ASSESSMENT — REFRACTION_MANIFEST
OD_CYLINDER: -1.75
OS_CYLINDER: -1.25
OD_AXIS: 105
OS_VA1: 20/
OD_VA3: 20/
OS_SPHERE: +1.50
OS_AXIS: 65
OS_VA2: 20/
OU_VA: 20/
OD_VA2: 20/
OD_VA1: 20/
OD_VA2: 20/
OS_VA1: 20/20
OD_VA3: 20/
OD_ADD: +2.75
OS_VA2: 20/
OS_VA3: 20/
OU_VA: 20/
OS_ADD: +2.75
OD_SPHERE: +0.75
OS_VA3: 20/
OD_VA1: 20/20

## 2019-06-20 ASSESSMENT — REFRACTION_AUTOREFRACTION
OD_SPHERE: +0.25
OS_CYLINDER: -1.25
OS_AXIS: 074
OS_SPHERE: +1.25
OD_CYLINDER: -1.75
OD_AXIS: 104

## 2019-06-20 ASSESSMENT — SPHEQUIV_DERIVED
OD_SPHEQUIV: -0.125
OS_SPHEQUIV: 0.625
OS_SPHEQUIV: 0.875
OD_SPHEQUIV: -0.625

## 2019-06-20 ASSESSMENT — LID POSITION - PTOSIS
OS_PTOSIS: 1+
OD_PTOSIS: 1+

## 2019-06-20 ASSESSMENT — CONFRONTATIONAL VISUAL FIELD TEST (CVF)
OD_FINDINGS: FULL
OS_FINDINGS: FULL

## 2019-06-20 ASSESSMENT — LID POSITION - COMMENTS
OD_COMMENTS: MRD1: 2/2
OS_COMMENTS: MRD1: 2/2

## 2019-06-20 ASSESSMENT — LID POSITION - DERMATOCHALASIS
OS_DERMATOCHALASIS: LUL 2+
OD_DERMATOCHALASIS: RUL 2+

## 2019-06-20 ASSESSMENT — VISUAL ACUITY
OD_BCVA: 20/30+1
OS_BCVA: 20/30

## 2019-06-20 ASSESSMENT — DRY EYES - PHYSICIAN NOTES
OS_GENERALCOMMENTS: KSICCA
OD_GENERALCOMMENTS: KSICCA

## 2019-07-29 ENCOUNTER — DOCTOR'S OFFICE (OUTPATIENT)
Dept: URBAN - NONMETROPOLITAN AREA CLINIC 1 | Facility: CLINIC | Age: 79
Setting detail: OPHTHALMOLOGY
End: 2019-07-29
Payer: COMMERCIAL

## 2019-07-29 DIAGNOSIS — E11.3211: ICD-10-CM

## 2019-07-29 DIAGNOSIS — H04.122: ICD-10-CM

## 2019-07-29 DIAGNOSIS — H43.813: ICD-10-CM

## 2019-07-29 DIAGNOSIS — E11.3292: ICD-10-CM

## 2019-07-29 DIAGNOSIS — H35.3131: ICD-10-CM

## 2019-07-29 DIAGNOSIS — H35.373: ICD-10-CM

## 2019-07-29 DIAGNOSIS — H04.121: ICD-10-CM

## 2019-07-29 PROCEDURE — 83861 MICROFLUID ANALY TEARS: CPT | Performed by: OPHTHALMOLOGY

## 2019-07-29 PROCEDURE — 92235 FLUORESCEIN ANGRPH MLTIFRAME: CPT | Performed by: OPHTHALMOLOGY

## 2019-07-29 PROCEDURE — 92134 CPTRZ OPH DX IMG PST SGM RTA: CPT | Performed by: OPHTHALMOLOGY

## 2019-07-29 PROCEDURE — 92250 FUNDUS PHOTOGRAPHY W/I&R: CPT | Performed by: OPHTHALMOLOGY

## 2019-07-29 PROCEDURE — 92014 COMPRE OPH EXAM EST PT 1/>: CPT | Performed by: OPHTHALMOLOGY

## 2019-07-29 ASSESSMENT — REFRACTION_CURRENTRX
OS_AXIS: 074
OD_OVR_VA: 20/
OS_VPRISM_DIRECTION: PROGS
OS_ADD: +2.75
OD_OVR_VA: 20/
OS_CYLINDER: -1.25
OD_AXIS: 104
OD_CYLINDER: -1.75
OS_SPHERE: +1.25
OD_VPRISM_DIRECTION: PROGS
OD_SPHERE: +0.50
OS_OVR_VA: 20/
OS_OVR_VA: 20/
OD_ADD: +2.75
OS_OVR_VA: 20/
OD_OVR_VA: 20/

## 2019-07-29 ASSESSMENT — REFRACTION_AUTOREFRACTION
OS_AXIS: 074
OS_CYLINDER: -1.25
OD_SPHERE: +0.25
OD_CYLINDER: -1.75
OD_AXIS: 104
OS_SPHERE: +1.25

## 2019-07-29 ASSESSMENT — REFRACTION_MANIFEST
OU_VA: 20/
OD_ADD: +2.75
OS_VA3: 20/
OU_VA: 20/
OD_VA1: 20/20
OD_CYLINDER: -1.75
OD_SPHERE: +0.75
OS_CYLINDER: -1.25
OD_VA3: 20/
OS_VA1: 20/
OS_ADD: +2.75
OS_VA3: 20/
OD_VA2: 20/
OS_AXIS: 65
OD_VA1: 20/
OD_AXIS: 105
OS_VA1: 20/20
OD_VA2: 20/
OS_VA2: 20/
OD_VA3: 20/
OS_SPHERE: +1.50
OS_VA2: 20/

## 2019-07-29 ASSESSMENT — DRY EYES - PHYSICIAN NOTES
OD_GENERALCOMMENTS: KSICCA
OS_GENERALCOMMENTS: KSICCA

## 2019-07-29 ASSESSMENT — LID POSITION - DERMATOCHALASIS
OD_DERMATOCHALASIS: RUL 2+
OS_DERMATOCHALASIS: LUL 2+

## 2019-07-29 ASSESSMENT — SPHEQUIV_DERIVED
OD_SPHEQUIV: -0.625
OD_SPHEQUIV: -0.125
OS_SPHEQUIV: 0.625
OS_SPHEQUIV: 0.875

## 2019-07-29 ASSESSMENT — CONFRONTATIONAL VISUAL FIELD TEST (CVF)
OD_FINDINGS: FULL
OS_FINDINGS: FULL

## 2019-07-29 ASSESSMENT — LID POSITION - COMMENTS
OD_COMMENTS: MRD1: 2/2
OS_COMMENTS: MRD1: 2/2

## 2019-07-29 ASSESSMENT — LID POSITION - PTOSIS
OD_PTOSIS: 1+
OS_PTOSIS: 1+

## 2019-07-29 ASSESSMENT — VISUAL ACUITY
OD_BCVA: 20/30+1
OS_BCVA: 20/30+1

## 2019-08-20 ENCOUNTER — DOCTOR'S OFFICE (OUTPATIENT)
Dept: URBAN - NONMETROPOLITAN AREA CLINIC 1 | Facility: CLINIC | Age: 79
Setting detail: OPHTHALMOLOGY
End: 2019-08-20
Payer: COMMERCIAL

## 2019-08-20 DIAGNOSIS — H35.3131: ICD-10-CM

## 2019-08-20 DIAGNOSIS — H43.813: ICD-10-CM

## 2019-08-20 DIAGNOSIS — E11.3292: ICD-10-CM

## 2019-08-20 DIAGNOSIS — H04.123: ICD-10-CM

## 2019-08-20 DIAGNOSIS — H35.373: ICD-10-CM

## 2019-08-20 DIAGNOSIS — E11.3211: ICD-10-CM

## 2019-08-20 DIAGNOSIS — E10.3591: ICD-10-CM

## 2019-08-20 DIAGNOSIS — H40.013: ICD-10-CM

## 2019-08-20 PROCEDURE — 92134 CPTRZ OPH DX IMG PST SGM RTA: CPT | Performed by: OPHTHALMOLOGY

## 2019-08-20 PROCEDURE — 76514 ECHO EXAM OF EYE THICKNESS: CPT | Performed by: OPHTHALMOLOGY

## 2019-08-20 PROCEDURE — 92083 EXTENDED VISUAL FIELD XM: CPT | Performed by: OPHTHALMOLOGY

## 2019-08-20 PROCEDURE — 92014 COMPRE OPH EXAM EST PT 1/>: CPT | Performed by: OPHTHALMOLOGY

## 2019-08-20 ASSESSMENT — REFRACTION_MANIFEST
OS_VA3: 20/
OS_CYLINDER: -1.25
OD_SPHERE: +0.75
OD_AXIS: 105
OD_VA1: 20/20
OS_SPHERE: +1.50
OS_VA2: 20/
OS_AXIS: 65
OU_VA: 20/
OD_VA3: 20/
OD_VA1: 20/
OD_VA2: 20/
OD_VA2: 20/
OS_ADD: +2.75
OS_VA1: 20/
OS_VA3: 20/
OD_CYLINDER: -1.75
OD_VA3: 20/
OS_VA1: 20/20
OD_ADD: +2.75
OS_VA2: 20/
OU_VA: 20/

## 2019-08-20 ASSESSMENT — KERATOMETRY
OS_K1POWER_DIOPTERS: +1.00
OD_K1POWER_DIOPTERS: +0.75
OD_AXISANGLE_DEGREES: 118
OS_AXISANGLE_DEGREES: 064
OS_K2POWER_DIOPTERS: -1.25
OD_K2POWER_DIOPTERS: -2.00

## 2019-08-20 ASSESSMENT — REFRACTION_CURRENTRX
OS_CYLINDER: -1.25
OS_OVR_VA: 20/
OD_OVR_VA: 20/
OD_ADD: +2.75
OD_CYLINDER: -1.75
OD_OVR_VA: 20/
OS_ADD: +2.75
OD_AXIS: 104
OS_OVR_VA: 20/
OD_OVR_VA: 20/
OD_VPRISM_DIRECTION: PROGS
OS_OVR_VA: 20/
OS_VPRISM_DIRECTION: PROGS
OD_SPHERE: +0.50
OS_AXIS: 074
OS_SPHERE: +1.25

## 2019-08-20 ASSESSMENT — DRY EYES - PHYSICIAN NOTES
OD_GENERALCOMMENTS: KSICCA
OS_GENERALCOMMENTS: KSICCA

## 2019-08-20 ASSESSMENT — PACHYMETRY
OS_CT_CORRECTION: -1
OS_CT_UM: 567
OD_CT_CORRECTION: -2
OD_CT_UM: 575

## 2019-08-20 ASSESSMENT — REFRACTION_AUTOREFRACTION
OS_SPHERE: +2.75
OS_CYLINDER: -2.75
OD_SPHERE: +1.00
OD_CYLINDER: -2.50
OD_AXIS: 091
OS_AXIS: 073

## 2019-08-20 ASSESSMENT — CONFRONTATIONAL VISUAL FIELD TEST (CVF)
OD_FINDINGS: FULL
OS_FINDINGS: FULL

## 2019-08-20 ASSESSMENT — VISUAL ACUITY
OD_BCVA: 20/30+2
OS_BCVA: 20/30+1

## 2019-08-20 ASSESSMENT — LID POSITION - DERMATOCHALASIS
OS_DERMATOCHALASIS: LUL 2+
OD_DERMATOCHALASIS: RUL 2+

## 2019-08-20 ASSESSMENT — SPHEQUIV_DERIVED
OS_SPHEQUIV: 1.375
OS_SPHEQUIV: 0.875
OD_SPHEQUIV: -0.125
OD_SPHEQUIV: -0.25

## 2019-08-20 ASSESSMENT — AXIALLENGTH_DERIVED
OD_AL: 76.38
OS_AL: 70.3729
OS_AL: 68.68
OD_AL: 75.8723

## 2019-08-20 ASSESSMENT — LID POSITION - COMMENTS
OD_COMMENTS: MRD1: 2/2
OS_COMMENTS: MRD1: 2/2

## 2019-08-20 ASSESSMENT — LID POSITION - PTOSIS
OD_PTOSIS: 1+
OS_PTOSIS: 1+

## 2019-09-30 ENCOUNTER — DOCTOR'S OFFICE (OUTPATIENT)
Dept: URBAN - NONMETROPOLITAN AREA CLINIC 1 | Facility: CLINIC | Age: 79
Setting detail: OPHTHALMOLOGY
End: 2019-09-30
Payer: COMMERCIAL

## 2019-09-30 DIAGNOSIS — H43.813: ICD-10-CM

## 2019-09-30 DIAGNOSIS — H40.013: ICD-10-CM

## 2019-09-30 DIAGNOSIS — H04.122: ICD-10-CM

## 2019-09-30 DIAGNOSIS — H35.373: ICD-10-CM

## 2019-09-30 DIAGNOSIS — E11.3292: ICD-10-CM

## 2019-09-30 DIAGNOSIS — H35.3131: ICD-10-CM

## 2019-09-30 DIAGNOSIS — H04.121: ICD-10-CM

## 2019-09-30 DIAGNOSIS — H43.812: ICD-10-CM

## 2019-09-30 DIAGNOSIS — H43.811: ICD-10-CM

## 2019-09-30 PROCEDURE — 83861 MICROFLUID ANALY TEARS: CPT | Performed by: OPHTHALMOLOGY

## 2019-09-30 PROCEDURE — 92014 COMPRE OPH EXAM EST PT 1/>: CPT | Performed by: OPHTHALMOLOGY

## 2019-09-30 PROCEDURE — 92134 CPTRZ OPH DX IMG PST SGM RTA: CPT | Performed by: OPHTHALMOLOGY

## 2019-09-30 PROCEDURE — 92226 OPHTHALMOSCOPY EXT SUBSEQUENT: CPT | Performed by: OPHTHALMOLOGY

## 2019-09-30 ASSESSMENT — LID POSITION - DERMATOCHALASIS
OD_DERMATOCHALASIS: RUL 2+
OS_DERMATOCHALASIS: LUL 2+

## 2019-09-30 ASSESSMENT — REFRACTION_CURRENTRX
OD_CYLINDER: -1.75
OD_OVR_VA: 20/
OS_SPHERE: +1.25
OS_OVR_VA: 20/
OD_ADD: +2.75
OS_OVR_VA: 20/
OS_OVR_VA: 20/
OS_CYLINDER: -1.25
OD_SPHERE: +0.50
OS_AXIS: 074
OD_OVR_VA: 20/
OS_ADD: +2.75
OS_VPRISM_DIRECTION: PROGS
OD_AXIS: 104
OD_VPRISM_DIRECTION: PROGS
OD_OVR_VA: 20/

## 2019-09-30 ASSESSMENT — LID POSITION - COMMENTS
OD_COMMENTS: MRD1: 2/2
OS_COMMENTS: MRD1: 2/2

## 2019-09-30 ASSESSMENT — REFRACTION_MANIFEST
OD_VA2: 20/
OS_VA2: 20/
OD_CYLINDER: -1.75
OU_VA: 20/
OD_ADD: +2.75
OS_CYLINDER: -1.25
OD_VA1: 20/
OD_VA1: 20/20
OS_VA2: 20/
OD_AXIS: 105
OD_SPHERE: +0.75
OU_VA: 20/
OS_VA1: 20/
OS_VA1: 20/20
OS_SPHERE: +1.50
OS_VA3: 20/
OD_VA3: 20/
OS_AXIS: 65
OD_VA3: 20/
OS_VA3: 20/
OS_ADD: +2.75
OD_VA2: 20/

## 2019-09-30 ASSESSMENT — REFRACTION_AUTOREFRACTION
OD_CYLINDER: -2.50
OS_AXIS: 073
OD_AXIS: 091
OS_CYLINDER: -2.75
OD_SPHERE: +1.00
OS_SPHERE: +2.75

## 2019-09-30 ASSESSMENT — CONFRONTATIONAL VISUAL FIELD TEST (CVF)
OD_FINDINGS: FULL
OS_FINDINGS: FULL

## 2019-09-30 ASSESSMENT — VISUAL ACUITY
OS_BCVA: 20/30+1
OD_BCVA: 20/30+2

## 2019-09-30 ASSESSMENT — SPHEQUIV_DERIVED
OD_SPHEQUIV: -0.125
OS_SPHEQUIV: 1.375
OS_SPHEQUIV: 0.875
OD_SPHEQUIV: -0.25

## 2019-09-30 ASSESSMENT — DRY EYES - PHYSICIAN NOTES
OD_GENERALCOMMENTS: KSICCA
OS_GENERALCOMMENTS: KSICCA

## 2019-09-30 ASSESSMENT — LID POSITION - PTOSIS
OD_PTOSIS: 1+
OS_PTOSIS: 1+

## 2019-10-07 ENCOUNTER — DOCTOR'S OFFICE (OUTPATIENT)
Dept: URBAN - NONMETROPOLITAN AREA CLINIC 1 | Facility: CLINIC | Age: 79
Setting detail: OPHTHALMOLOGY
End: 2019-10-07
Payer: COMMERCIAL

## 2019-10-07 DIAGNOSIS — H04.123: ICD-10-CM

## 2019-10-07 PROCEDURE — 68761 CLOSE TEAR DUCT OPENING: CPT | Performed by: OPHTHALMOLOGY

## 2019-10-07 ASSESSMENT — REFRACTION_MANIFEST
OD_VA2: 20/
OU_VA: 20/
OU_VA: 20/
OS_CYLINDER: -1.25
OD_VA3: 20/
OS_VA3: 20/
OD_CYLINDER: -1.75
OD_VA3: 20/
OS_VA2: 20/
OD_VA1: 20/20
OS_VA3: 20/
OS_VA1: 20/20
OD_VA2: 20/
OD_SPHERE: +0.75
OD_ADD: +2.75
OS_VA2: 20/
OS_ADD: +2.75
OD_VA1: 20/
OS_AXIS: 65
OS_VA1: 20/
OS_SPHERE: +1.50
OD_AXIS: 105

## 2019-10-07 ASSESSMENT — REFRACTION_AUTOREFRACTION
OS_SPHERE: +2.75
OD_SPHERE: +1.00
OS_AXIS: 073
OD_AXIS: 091
OD_CYLINDER: -2.50
OS_CYLINDER: -2.75

## 2019-10-07 ASSESSMENT — SPHEQUIV_DERIVED
OS_SPHEQUIV: 1.375
OD_SPHEQUIV: -0.125
OS_SPHEQUIV: 0.875
OD_SPHEQUIV: -0.25

## 2019-10-07 ASSESSMENT — VISUAL ACUITY
OS_BCVA: 20/30+1
OD_BCVA: 20/30+2

## 2019-10-07 ASSESSMENT — REFRACTION_CURRENTRX
OS_VPRISM_DIRECTION: PROGS
OD_ADD: +2.75
OS_CYLINDER: -1.25
OS_ADD: +2.75
OD_AXIS: 104
OS_OVR_VA: 20/
OD_CYLINDER: -1.75
OS_SPHERE: +1.25
OS_OVR_VA: 20/
OD_OVR_VA: 20/
OS_OVR_VA: 20/
OD_OVR_VA: 20/
OD_SPHERE: +0.50
OD_OVR_VA: 20/
OD_VPRISM_DIRECTION: PROGS
OS_AXIS: 074

## 2019-11-11 ENCOUNTER — DOCTOR'S OFFICE (OUTPATIENT)
Dept: URBAN - NONMETROPOLITAN AREA CLINIC 1 | Facility: CLINIC | Age: 79
Setting detail: OPHTHALMOLOGY
End: 2019-11-11
Payer: COMMERCIAL

## 2019-11-11 DIAGNOSIS — H35.3131: ICD-10-CM

## 2019-11-11 DIAGNOSIS — E11.3211: ICD-10-CM

## 2019-11-11 DIAGNOSIS — H43.813: ICD-10-CM

## 2019-11-11 DIAGNOSIS — H04.121: ICD-10-CM

## 2019-11-11 DIAGNOSIS — H04.122: ICD-10-CM

## 2019-11-11 DIAGNOSIS — H35.373: ICD-10-CM

## 2019-11-11 DIAGNOSIS — E11.3292: ICD-10-CM

## 2019-11-11 PROCEDURE — 83861 MICROFLUID ANALY TEARS: CPT | Performed by: OPHTHALMOLOGY

## 2019-11-11 PROCEDURE — 92134 CPTRZ OPH DX IMG PST SGM RTA: CPT | Performed by: OPHTHALMOLOGY

## 2019-11-11 PROCEDURE — 92014 COMPRE OPH EXAM EST PT 1/>: CPT | Performed by: OPHTHALMOLOGY

## 2019-11-11 ASSESSMENT — REFRACTION_CURRENTRX
OD_ADD: +2.75
OS_AXIS: 074
OS_OVR_VA: 20/
OS_OVR_VA: 20/
OD_AXIS: 104
OD_OVR_VA: 20/
OD_CYLINDER: -1.75
OD_OVR_VA: 20/
OS_SPHERE: +1.25
OS_CYLINDER: -1.25
OD_OVR_VA: 20/
OS_ADD: +2.75
OD_VPRISM_DIRECTION: PROGS
OD_SPHERE: +0.50
OS_VPRISM_DIRECTION: PROGS
OS_OVR_VA: 20/

## 2019-11-11 ASSESSMENT — REFRACTION_AUTOREFRACTION
OD_CYLINDER: -2.50
OS_CYLINDER: -2.75
OD_AXIS: 091
OD_SPHERE: +1.00
OS_SPHERE: +2.75
OS_AXIS: 073

## 2019-11-11 ASSESSMENT — REFRACTION_MANIFEST
OS_VA3: 20/
OU_VA: 20/
OS_SPHERE: +1.50
OD_CYLINDER: -1.75
OS_VA2: 20/
OU_VA: 20/
OS_ADD: +2.75
OS_VA1: 20/20
OD_SPHERE: +0.75
OS_CYLINDER: -1.25
OD_VA1: 20/
OD_ADD: +2.75
OS_VA3: 20/
OD_VA2: 20/
OD_VA1: 20/20
OD_VA3: 20/
OS_VA2: 20/
OD_VA3: 20/
OD_VA2: 20/
OD_AXIS: 105
OS_VA1: 20/
OS_AXIS: 65

## 2019-11-11 ASSESSMENT — SPHEQUIV_DERIVED
OS_SPHEQUIV: 1.375
OD_SPHEQUIV: -0.125
OS_SPHEQUIV: 0.875
OD_SPHEQUIV: -0.25

## 2019-11-11 ASSESSMENT — LID POSITION - DERMATOCHALASIS
OD_DERMATOCHALASIS: RUL 2+
OS_DERMATOCHALASIS: LUL 2+

## 2019-11-11 ASSESSMENT — LID POSITION - COMMENTS
OD_COMMENTS: MRD1: 2/2
OS_COMMENTS: MRD1: 2/2

## 2019-11-11 ASSESSMENT — CONFRONTATIONAL VISUAL FIELD TEST (CVF)
OD_FINDINGS: FULL
OS_FINDINGS: FULL

## 2019-11-11 ASSESSMENT — LID POSITION - PTOSIS
OD_PTOSIS: 1+
OS_PTOSIS: 1+

## 2019-11-11 ASSESSMENT — VISUAL ACUITY
OS_BCVA: 20/30
OD_BCVA: 20/30+2

## 2019-11-11 ASSESSMENT — DRY EYES - PHYSICIAN NOTES
OS_GENERALCOMMENTS: KSICCA
OD_GENERALCOMMENTS: KSICCA

## 2019-12-02 ENCOUNTER — DOCTOR'S OFFICE (OUTPATIENT)
Dept: URBAN - NONMETROPOLITAN AREA CLINIC 1 | Facility: CLINIC | Age: 79
Setting detail: OPHTHALMOLOGY
End: 2019-12-02
Payer: COMMERCIAL

## 2019-12-02 DIAGNOSIS — E11.3292: ICD-10-CM

## 2019-12-02 DIAGNOSIS — H35.373: ICD-10-CM

## 2019-12-02 DIAGNOSIS — E11.3211: ICD-10-CM

## 2019-12-02 PROCEDURE — 92014 COMPRE OPH EXAM EST PT 1/>: CPT | Performed by: OPHTHALMOLOGY

## 2019-12-02 PROCEDURE — 92250 FUNDUS PHOTOGRAPHY W/I&R: CPT | Performed by: OPHTHALMOLOGY

## 2019-12-02 PROCEDURE — 92235 FLUORESCEIN ANGRPH MLTIFRAME: CPT | Performed by: OPHTHALMOLOGY

## 2019-12-02 ASSESSMENT — LID POSITION - PTOSIS
OD_PTOSIS: 1+
OS_PTOSIS: 1+

## 2019-12-02 ASSESSMENT — VISUAL ACUITY
OD_BCVA: 20/30+2
OS_BCVA: 20/30

## 2019-12-02 ASSESSMENT — REFRACTION_MANIFEST
OS_VA1: 20/20
OD_VA3: 20/
OS_VA1: 20/
OD_AXIS: 105
OU_VA: 20/
OS_VA2: 20/
OS_VA3: 20/
OS_VA3: 20/
OD_ADD: +2.75
OS_CYLINDER: -1.25
OD_VA2: 20/
OD_VA2: 20/
OD_SPHERE: +0.75
OS_AXIS: 65
OS_ADD: +2.75
OD_VA1: 20/
OS_SPHERE: +1.50
OD_VA1: 20/20
OD_VA3: 20/
OU_VA: 20/
OD_CYLINDER: -1.75
OS_VA2: 20/

## 2019-12-02 ASSESSMENT — REFRACTION_CURRENTRX
OS_VPRISM_DIRECTION: PROGS
OS_AXIS: 074
OD_CYLINDER: -1.75
OD_VPRISM_DIRECTION: PROGS
OS_SPHERE: +1.25
OS_OVR_VA: 20/
OD_AXIS: 104
OS_CYLINDER: -1.25
OS_ADD: +2.75
OD_OVR_VA: 20/
OD_SPHERE: +0.50
OS_OVR_VA: 20/
OD_OVR_VA: 20/
OD_ADD: +2.75
OS_OVR_VA: 20/
OD_OVR_VA: 20/

## 2019-12-02 ASSESSMENT — SPHEQUIV_DERIVED
OD_SPHEQUIV: -0.125
OS_SPHEQUIV: 0.875
OS_SPHEQUIV: 1.375
OD_SPHEQUIV: -0.25

## 2019-12-02 ASSESSMENT — LID POSITION - DERMATOCHALASIS
OS_DERMATOCHALASIS: LUL 2+
OD_DERMATOCHALASIS: RUL 2+

## 2019-12-02 ASSESSMENT — REFRACTION_AUTOREFRACTION
OS_SPHERE: +2.75
OS_AXIS: 073
OD_CYLINDER: -2.50
OS_CYLINDER: -2.75
OD_AXIS: 091
OD_SPHERE: +1.00

## 2019-12-02 ASSESSMENT — LID POSITION - COMMENTS
OS_COMMENTS: MRD1: 2/2
OD_COMMENTS: MRD1: 2/2

## 2019-12-02 ASSESSMENT — DRY EYES - PHYSICIAN NOTES
OS_GENERALCOMMENTS: KSICCA
OD_GENERALCOMMENTS: KSICCA

## 2019-12-02 ASSESSMENT — CONFRONTATIONAL VISUAL FIELD TEST (CVF)
OD_FINDINGS: FULL
OS_FINDINGS: FULL

## 2019-12-09 ENCOUNTER — DOCTOR'S OFFICE (OUTPATIENT)
Dept: URBAN - NONMETROPOLITAN AREA CLINIC 1 | Facility: CLINIC | Age: 79
Setting detail: OPHTHALMOLOGY
End: 2019-12-09
Payer: COMMERCIAL

## 2019-12-09 DIAGNOSIS — H04.123: ICD-10-CM

## 2019-12-09 PROCEDURE — 68761 CLOSE TEAR DUCT OPENING: CPT | Performed by: OPHTHALMOLOGY

## 2019-12-09 ASSESSMENT — REFRACTION_MANIFEST
OS_VA3: 20/
OD_SPHERE: +0.75
OS_CYLINDER: -1.25
OS_VA2: 20/
OS_VA3: 20/
OU_VA: 20/
OD_CYLINDER: -1.75
OD_VA3: 20/
OS_AXIS: 65
OD_VA2: 20/
OS_SPHERE: +1.50
OD_ADD: +2.75
OS_VA1: 20/20
OS_VA1: 20/
OD_AXIS: 105
OU_VA: 20/
OD_VA1: 20/
OS_VA2: 20/
OD_VA1: 20/20
OD_VA3: 20/
OD_VA2: 20/
OS_ADD: +2.75

## 2019-12-09 ASSESSMENT — REFRACTION_CURRENTRX
OS_AXIS: 074
OS_OVR_VA: 20/
OS_CYLINDER: -1.25
OD_CYLINDER: -1.75
OD_AXIS: 104
OS_OVR_VA: 20/
OD_SPHERE: +0.50
OS_SPHERE: +1.25
OS_ADD: +2.75
OD_OVR_VA: 20/
OD_VPRISM_DIRECTION: PROGS
OS_VPRISM_DIRECTION: PROGS
OD_OVR_VA: 20/
OS_OVR_VA: 20/
OD_OVR_VA: 20/
OD_ADD: +2.75

## 2019-12-09 ASSESSMENT — VISUAL ACUITY
OD_BCVA: 20/30+2
OS_BCVA: 20/30

## 2019-12-09 ASSESSMENT — REFRACTION_AUTOREFRACTION
OS_CYLINDER: -2.75
OS_SPHERE: +2.75
OD_AXIS: 091
OD_SPHERE: +1.00
OS_AXIS: 073
OD_CYLINDER: -2.50

## 2019-12-09 ASSESSMENT — SPHEQUIV_DERIVED
OS_SPHEQUIV: 0.875
OS_SPHEQUIV: 1.375
OD_SPHEQUIV: -0.125
OD_SPHEQUIV: -0.25

## 2020-01-23 ENCOUNTER — DOCTOR'S OFFICE (OUTPATIENT)
Dept: URBAN - NONMETROPOLITAN AREA CLINIC 1 | Facility: CLINIC | Age: 80
Setting detail: OPHTHALMOLOGY
End: 2020-01-23
Payer: COMMERCIAL

## 2020-01-23 DIAGNOSIS — H35.3131: ICD-10-CM

## 2020-01-23 DIAGNOSIS — H35.373: ICD-10-CM

## 2020-01-23 DIAGNOSIS — E11.3292: ICD-10-CM

## 2020-01-23 DIAGNOSIS — H04.121: ICD-10-CM

## 2020-01-23 DIAGNOSIS — H40.013: ICD-10-CM

## 2020-01-23 DIAGNOSIS — H04.122: ICD-10-CM

## 2020-01-23 DIAGNOSIS — E11.3211: ICD-10-CM

## 2020-01-23 PROCEDURE — 83861 MICROFLUID ANALY TEARS: CPT | Performed by: OPHTHALMOLOGY

## 2020-01-23 PROCEDURE — 92134 CPTRZ OPH DX IMG PST SGM RTA: CPT | Performed by: OPHTHALMOLOGY

## 2020-01-23 PROCEDURE — 92014 COMPRE OPH EXAM EST PT 1/>: CPT | Performed by: OPHTHALMOLOGY

## 2020-01-23 ASSESSMENT — DRY EYES - PHYSICIAN NOTES
OS_GENERALCOMMENTS: KSICCA
OD_GENERALCOMMENTS: KSICCA

## 2020-01-23 ASSESSMENT — REFRACTION_MANIFEST
OS_VA3: 20/
OU_VA: 20/
OD_VA3: 20/
OD_CYLINDER: -1.75
OS_VA2: 20/
OS_ADD: +2.75
OD_VA1: 20/20
OD_ADD: +2.75
OS_VA1: 20/20
OD_VA2: 20/
OS_CYLINDER: -1.25
OD_SPHERE: +0.75
OS_SPHERE: +1.50
OD_AXIS: 105
OS_AXIS: 65

## 2020-01-23 ASSESSMENT — CONFRONTATIONAL VISUAL FIELD TEST (CVF)
OS_FINDINGS: FULL
OD_FINDINGS: FULL

## 2020-01-23 ASSESSMENT — LID POSITION - COMMENTS
OD_COMMENTS: MRD1: 2/2
OS_COMMENTS: MRD1: 2/2

## 2020-01-23 ASSESSMENT — REFRACTION_CURRENTRX
OS_OVR_VA: 20/
OS_SPHERE: +1.25
OD_AXIS: 104
OD_ADD: +2.75
OD_OVR_VA: 20/
OS_ADD: +2.75
OD_VPRISM_DIRECTION: PROGS
OS_VPRISM_DIRECTION: PROGS
OS_CYLINDER: -1.25
OD_SPHERE: +0.50
OS_AXIS: 074
OD_CYLINDER: -1.75

## 2020-01-23 ASSESSMENT — KERATOMETRY
OS_K1POWER_DIOPTERS: +1.00
OD_K1POWER_DIOPTERS: +0.75
OD_AXISANGLE_DEGREES: 118
OD_K2POWER_DIOPTERS: -2.00
OS_AXISANGLE_DEGREES: 064
OS_K2POWER_DIOPTERS: -1.25

## 2020-01-23 ASSESSMENT — SPHEQUIV_DERIVED
OD_SPHEQUIV: -0.125
OS_SPHEQUIV: 0.875
OS_SPHEQUIV: 1.375
OD_SPHEQUIV: -0.25

## 2020-01-23 ASSESSMENT — VISUAL ACUITY
OS_BCVA: 20/40-1
OD_BCVA: 20/50

## 2020-01-23 ASSESSMENT — REFRACTION_AUTOREFRACTION
OS_AXIS: 073
OD_AXIS: 091
OD_SPHERE: +1.00
OD_CYLINDER: -2.50
OS_SPHERE: +2.75
OS_CYLINDER: -2.75

## 2020-01-23 ASSESSMENT — SUPERFICIAL PUNCTATE KERATITIS (SPK)
OD_SPK: 1+ 2+
OS_SPK: 1+ 2+

## 2020-01-23 ASSESSMENT — AXIALLENGTH_DERIVED
OS_AL: 70.3729
OS_AL: 68.68
OD_AL: 75.8723
OD_AL: 76.38

## 2020-01-23 ASSESSMENT — LID POSITION - PTOSIS
OD_PTOSIS: 1+
OS_PTOSIS: 1+

## 2020-01-23 ASSESSMENT — LID POSITION - DERMATOCHALASIS
OS_DERMATOCHALASIS: LUL 2+
OD_DERMATOCHALASIS: RUL 2+

## 2020-01-30 ENCOUNTER — DOCTOR'S OFFICE (OUTPATIENT)
Dept: URBAN - NONMETROPOLITAN AREA CLINIC 1 | Facility: CLINIC | Age: 80
Setting detail: OPHTHALMOLOGY
End: 2020-01-30
Payer: COMMERCIAL

## 2020-01-30 DIAGNOSIS — H04.123: ICD-10-CM

## 2020-01-30 PROCEDURE — 68761 CLOSE TEAR DUCT OPENING: CPT | Performed by: OPHTHALMOLOGY

## 2020-01-30 ASSESSMENT — REFRACTION_AUTOREFRACTION
OS_AXIS: 073
OD_CYLINDER: -2.50
OS_CYLINDER: -2.75
OD_AXIS: 091
OS_SPHERE: +2.75
OD_SPHERE: +1.00

## 2020-01-30 ASSESSMENT — REFRACTION_MANIFEST
OS_VA2: 20/
OD_VA3: 20/
OS_CYLINDER: -1.25
OD_SPHERE: +0.75
OD_AXIS: 105
OS_AXIS: 65
OS_ADD: +2.75
OD_VA1: 20/20
OU_VA: 20/
OS_VA1: 20/20
OS_VA3: 20/
OS_SPHERE: +1.50
OD_ADD: +2.75
OD_CYLINDER: -1.75
OD_VA2: 20/

## 2020-01-30 ASSESSMENT — SPHEQUIV_DERIVED
OS_SPHEQUIV: 1.375
OD_SPHEQUIV: -0.125
OD_SPHEQUIV: -0.25
OS_SPHEQUIV: 0.875

## 2020-01-30 ASSESSMENT — REFRACTION_CURRENTRX
OS_SPHERE: +1.25
OS_ADD: +2.75
OD_SPHERE: +0.50
OS_AXIS: 074
OD_VPRISM_DIRECTION: PROGS
OD_AXIS: 104
OS_VPRISM_DIRECTION: PROGS
OS_OVR_VA: 20/
OD_CYLINDER: -1.75
OD_ADD: +2.75
OD_OVR_VA: 20/
OS_CYLINDER: -1.25

## 2020-01-30 ASSESSMENT — KERATOMETRY
OD_K2POWER_DIOPTERS: -2.00
OD_AXISANGLE_DEGREES: 118
OS_K2POWER_DIOPTERS: -1.25
OS_AXISANGLE_DEGREES: 064
OS_K1POWER_DIOPTERS: +1.00
OD_K1POWER_DIOPTERS: +0.75

## 2020-01-30 ASSESSMENT — AXIALLENGTH_DERIVED
OS_AL: 68.68
OS_AL: 70.3729
OD_AL: 76.38
OD_AL: 75.8723

## 2020-01-30 ASSESSMENT — VISUAL ACUITY
OS_BCVA: 20/40-1
OD_BCVA: 20/50

## 2020-02-20 ENCOUNTER — DOCTOR'S OFFICE (OUTPATIENT)
Dept: URBAN - NONMETROPOLITAN AREA CLINIC 1 | Facility: CLINIC | Age: 80
Setting detail: OPHTHALMOLOGY
End: 2020-02-20
Payer: COMMERCIAL

## 2020-02-20 VITALS — HEIGHT: 55 IN

## 2020-02-20 DIAGNOSIS — H35.373: ICD-10-CM

## 2020-02-20 DIAGNOSIS — H35.3131: ICD-10-CM

## 2020-02-20 DIAGNOSIS — E11.3292: ICD-10-CM

## 2020-02-20 DIAGNOSIS — H04.121: ICD-10-CM

## 2020-02-20 DIAGNOSIS — E11.3211: ICD-10-CM

## 2020-02-20 DIAGNOSIS — H04.122: ICD-10-CM

## 2020-02-20 PROCEDURE — 83861 MICROFLUID ANALY TEARS: CPT | Performed by: OPHTHALMOLOGY

## 2020-02-20 PROCEDURE — 92134 CPTRZ OPH DX IMG PST SGM RTA: CPT | Performed by: OPHTHALMOLOGY

## 2020-02-20 PROCEDURE — 92014 COMPRE OPH EXAM EST PT 1/>: CPT | Performed by: OPHTHALMOLOGY

## 2020-02-20 ASSESSMENT — LID POSITION - DERMATOCHALASIS
OD_DERMATOCHALASIS: RUL 2+
OS_DERMATOCHALASIS: LUL 2+

## 2020-02-20 ASSESSMENT — REFRACTION_MANIFEST
OD_ADD: +2.75
OS_VA1: 20/20
OS_ADD: +2.75
OD_SPHERE: +0.75
OS_SPHERE: +1.50
OD_VA1: 20/20
OD_AXIS: 105
OS_AXIS: 65
OS_CYLINDER: -1.25
OD_CYLINDER: -1.75

## 2020-02-20 ASSESSMENT — AXIALLENGTH_DERIVED
OS_AL: 70.3729
OS_AL: 68.68
OD_AL: 76.38
OD_AL: 75.8723

## 2020-02-20 ASSESSMENT — REFRACTION_CURRENTRX
OS_VPRISM_DIRECTION: PROGS
OS_CYLINDER: -1.25
OS_ADD: +2.75
OS_SPHERE: +1.25
OD_VPRISM_DIRECTION: PROGS
OD_AXIS: 104
OD_ADD: +2.75
OS_OVR_VA: 20/
OS_AXIS: 074
OD_OVR_VA: 20/
OD_CYLINDER: -1.75
OD_SPHERE: +0.50

## 2020-02-20 ASSESSMENT — CONFRONTATIONAL VISUAL FIELD TEST (CVF)
OD_FINDINGS: FULL
OS_FINDINGS: FULL

## 2020-02-20 ASSESSMENT — REFRACTION_AUTOREFRACTION
OS_CYLINDER: -2.75
OD_SPHERE: +1.00
OD_CYLINDER: -2.50
OS_SPHERE: +2.75
OD_AXIS: 091
OS_AXIS: 073

## 2020-02-20 ASSESSMENT — SPHEQUIV_DERIVED
OS_SPHEQUIV: 0.875
OS_SPHEQUIV: 1.375
OD_SPHEQUIV: -0.25
OD_SPHEQUIV: -0.125

## 2020-02-20 ASSESSMENT — KERATOMETRY
OD_K2POWER_DIOPTERS: -2.00
OD_K1POWER_DIOPTERS: +0.75
OS_K1POWER_DIOPTERS: +1.00
OD_AXISANGLE_DEGREES: 118
OS_K2POWER_DIOPTERS: -1.25
OS_AXISANGLE_DEGREES: 064

## 2020-02-20 ASSESSMENT — VISUAL ACUITY
OD_BCVA: 20/30-2
OS_BCVA: 20/30

## 2020-02-20 ASSESSMENT — LID POSITION - PTOSIS
OD_PTOSIS: 1+
OS_PTOSIS: 1+

## 2020-02-20 ASSESSMENT — SUPERFICIAL PUNCTATE KERATITIS (SPK)
OS_SPK: 1+ 2+
OD_SPK: 1+ 2+

## 2020-02-20 ASSESSMENT — LID POSITION - COMMENTS
OD_COMMENTS: MRD1: 2/2
OS_COMMENTS: MRD1: 2/2

## 2020-02-20 ASSESSMENT — DRY EYES - PHYSICIAN NOTES
OD_GENERALCOMMENTS: KSICCA
OS_GENERALCOMMENTS: KSICCA

## 2020-02-25 ENCOUNTER — DOCTOR'S OFFICE (OUTPATIENT)
Dept: URBAN - NONMETROPOLITAN AREA CLINIC 1 | Facility: CLINIC | Age: 80
Setting detail: OPHTHALMOLOGY
End: 2020-02-25
Payer: COMMERCIAL

## 2020-02-25 VITALS — HEIGHT: 55 IN

## 2020-02-25 DIAGNOSIS — H40.013: ICD-10-CM

## 2020-02-25 DIAGNOSIS — H35.373: ICD-10-CM

## 2020-02-25 DIAGNOSIS — E11.3211: ICD-10-CM

## 2020-02-25 DIAGNOSIS — E11.3292: ICD-10-CM

## 2020-02-25 PROCEDURE — 92133 CPTRZD OPH DX IMG PST SGM ON: CPT | Performed by: OPHTHALMOLOGY

## 2020-02-25 PROCEDURE — 92014 COMPRE OPH EXAM EST PT 1/>: CPT | Performed by: OPHTHALMOLOGY

## 2020-02-25 ASSESSMENT — CONFRONTATIONAL VISUAL FIELD TEST (CVF)
OD_FINDINGS: FULL
OS_FINDINGS: FULL

## 2020-02-25 ASSESSMENT — REFRACTION_CURRENTRX
OD_CYLINDER: -1.75
OS_SPHERE: +1.25
OS_OVR_VA: 20/
OS_CYLINDER: -1.25
OS_AXIS: 074
OD_OVR_VA: 20/
OS_VPRISM_DIRECTION: PROGS
OD_AXIS: 104
OD_SPHERE: +0.50
OD_ADD: +2.75
OD_VPRISM_DIRECTION: PROGS
OS_ADD: +2.75

## 2020-02-25 ASSESSMENT — SUPERFICIAL PUNCTATE KERATITIS (SPK)
OD_SPK: 1+ 2+
OS_SPK: 1+ 2+

## 2020-02-25 ASSESSMENT — PUNCTA - ASSESSMENT
OS_PUNCTA: SIL PLUG
OD_PUNCTA: SIL PLUG

## 2020-02-25 ASSESSMENT — KERATOMETRY
OD_K1POWER_DIOPTERS: +0.75
OD_AXISANGLE_DEGREES: 118
OS_AXISANGLE_DEGREES: 064
OS_K1POWER_DIOPTERS: +1.00
OS_K2POWER_DIOPTERS: -1.25
OD_K2POWER_DIOPTERS: -2.00

## 2020-02-25 ASSESSMENT — LID POSITION - PTOSIS
OS_PTOSIS: 1+
OD_PTOSIS: 1+

## 2020-02-25 ASSESSMENT — SPHEQUIV_DERIVED
OD_SPHEQUIV: -0.5
OS_SPHEQUIV: 0.875
OD_SPHEQUIV: -0.125
OS_SPHEQUIV: 1.125

## 2020-02-25 ASSESSMENT — REFRACTION_AUTOREFRACTION
OS_SPHERE: +2.00
OD_SPHERE: +0.50
OS_AXIS: 085
OD_CYLINDER: -2.00
OD_AXIS: 090
OS_CYLINDER: -1.75

## 2020-02-25 ASSESSMENT — DRY EYES - PHYSICIAN NOTES
OS_GENERALCOMMENTS: KSICCA
OD_GENERALCOMMENTS: KSICCA

## 2020-02-25 ASSESSMENT — LID POSITION - ECTROPION
OD_ECTROPION: RLL
OS_ECTROPION: LLL

## 2020-02-25 ASSESSMENT — VISUAL ACUITY
OS_BCVA: 20/40-1
OD_BCVA: 20/30

## 2020-02-25 ASSESSMENT — REFRACTION_MANIFEST
OS_VA1: 20/20
OS_AXIS: 65
OD_CYLINDER: -1.75
OD_ADD: +2.75
OD_AXIS: 105
OD_SPHERE: +0.75
OS_ADD: +2.75
OS_CYLINDER: -1.25
OS_SPHERE: +1.50
OD_VA1: 20/20

## 2020-02-25 ASSESSMENT — AXIALLENGTH_DERIVED
OS_AL: 69.52
OD_AL: 77.4141
OD_AL: 75.8723
OS_AL: 70.3729

## 2020-02-25 ASSESSMENT — LID POSITION - COMMENTS
OD_COMMENTS: MRD1: 2/2
OS_COMMENTS: MRD1: 2/2

## 2020-02-25 ASSESSMENT — LACRIMAL DUCT - ASSESSMENT
OS_LACRIMAL_DUCT: LLL
OD_LACRIMAL_DUCT: RLL

## 2020-02-25 ASSESSMENT — LID POSITION - DERMATOCHALASIS
OD_DERMATOCHALASIS: RUL 2+
OS_DERMATOCHALASIS: LUL 2+

## 2020-06-04 ENCOUNTER — DOCTOR'S OFFICE (OUTPATIENT)
Dept: URBAN - NONMETROPOLITAN AREA CLINIC 1 | Facility: CLINIC | Age: 80
Setting detail: OPHTHALMOLOGY
End: 2020-06-04
Payer: COMMERCIAL

## 2020-06-04 DIAGNOSIS — E10.3591: ICD-10-CM

## 2020-06-04 DIAGNOSIS — H40.013: ICD-10-CM

## 2020-06-04 DIAGNOSIS — E11.3292: ICD-10-CM

## 2020-06-04 DIAGNOSIS — H35.3131: ICD-10-CM

## 2020-06-04 DIAGNOSIS — H35.373: ICD-10-CM

## 2020-06-04 DIAGNOSIS — H04.122: ICD-10-CM

## 2020-06-04 DIAGNOSIS — E10.3592: ICD-10-CM

## 2020-06-04 DIAGNOSIS — H43.813: ICD-10-CM

## 2020-06-04 DIAGNOSIS — H04.121: ICD-10-CM

## 2020-06-04 DIAGNOSIS — E11.3211: ICD-10-CM

## 2020-06-04 PROCEDURE — 83861 MICROFLUID ANALY TEARS: CPT | Performed by: OPHTHALMOLOGY

## 2020-06-04 PROCEDURE — 92014 COMPRE OPH EXAM EST PT 1/>: CPT | Performed by: OPHTHALMOLOGY

## 2020-06-04 PROCEDURE — 92134 CPTRZ OPH DX IMG PST SGM RTA: CPT | Performed by: OPHTHALMOLOGY

## 2020-06-04 PROCEDURE — 92201 OPSCPY EXTND RTA DRAW UNI/BI: CPT | Performed by: OPHTHALMOLOGY

## 2020-06-04 ASSESSMENT — REFRACTION_MANIFEST
OS_CYLINDER: -1.25
OS_SPHERE: +1.50
OD_CYLINDER: -1.75
OS_VA1: 20/20
OD_SPHERE: +0.75
OS_ADD: +2.75
OD_VA1: 20/20
OD_ADD: +2.75
OS_AXIS: 65
OD_AXIS: 105

## 2020-06-04 ASSESSMENT — REFRACTION_CURRENTRX
OD_VPRISM_DIRECTION: PROGS
OS_OVR_VA: 20/
OS_CYLINDER: -1.25
OD_SPHERE: +0.50
OS_ADD: +2.75
OD_OVR_VA: 20/
OD_ADD: +2.75
OD_AXIS: 104
OS_SPHERE: +1.25
OS_VPRISM_DIRECTION: PROGS
OS_AXIS: 074
OD_CYLINDER: -1.75

## 2020-06-04 ASSESSMENT — SPHEQUIV_DERIVED
OS_SPHEQUIV: 0.875
OS_SPHEQUIV: 1.125
OD_SPHEQUIV: -0.5
OD_SPHEQUIV: -0.125

## 2020-06-04 ASSESSMENT — KERATOMETRY
OD_K2POWER_DIOPTERS: -2.00
OS_AXISANGLE_DEGREES: 064
OD_AXISANGLE_DEGREES: 118
OS_K1POWER_DIOPTERS: +1.00
OS_K2POWER_DIOPTERS: -1.25
OD_K1POWER_DIOPTERS: +0.75

## 2020-06-04 ASSESSMENT — SUPERFICIAL PUNCTATE KERATITIS (SPK)
OD_SPK: 1+ 2+
OS_SPK: 1+ 2+

## 2020-06-04 ASSESSMENT — REFRACTION_AUTOREFRACTION
OD_SPHERE: +0.50
OD_CYLINDER: -2.00
OD_AXIS: 090
OS_AXIS: 085
OS_CYLINDER: -1.75
OS_SPHERE: +2.00

## 2020-06-04 ASSESSMENT — AXIALLENGTH_DERIVED
OD_AL: 75.8723
OD_AL: 77.4141
OS_AL: 69.52
OS_AL: 70.3729

## 2020-06-04 ASSESSMENT — LACRIMAL DUCT - ASSESSMENT
OS_LACRIMAL_DUCT: LLL
OD_LACRIMAL_DUCT: RLL

## 2020-06-04 ASSESSMENT — VISUAL ACUITY
OD_BCVA: 20/30-2
OS_BCVA: 20/30-2

## 2020-06-04 ASSESSMENT — DRY EYES - PHYSICIAN NOTES
OD_GENERALCOMMENTS: KSICCA
OS_GENERALCOMMENTS: KSICCA

## 2020-06-04 ASSESSMENT — LID POSITION - DERMATOCHALASIS
OS_DERMATOCHALASIS: LUL 2+
OD_DERMATOCHALASIS: RUL 2+

## 2020-06-04 ASSESSMENT — LID POSITION - COMMENTS
OS_COMMENTS: MRD1: 2/2
OD_COMMENTS: MRD1: 2/2

## 2020-06-04 ASSESSMENT — LID POSITION - PTOSIS
OS_PTOSIS: 1+
OD_PTOSIS: 1+

## 2020-06-04 ASSESSMENT — PUNCTA - ASSESSMENT
OD_PUNCTA: SIL PLUG
OS_PUNCTA: SIL PLUG

## 2020-06-04 ASSESSMENT — LID POSITION - ECTROPION
OD_ECTROPION: RLL
OS_ECTROPION: LLL

## 2020-06-18 ENCOUNTER — DOCTOR'S OFFICE (OUTPATIENT)
Dept: URBAN - NONMETROPOLITAN AREA CLINIC 1 | Facility: CLINIC | Age: 80
Setting detail: OPHTHALMOLOGY
End: 2020-06-18
Payer: COMMERCIAL

## 2020-06-18 DIAGNOSIS — E11.3211: ICD-10-CM

## 2020-06-18 DIAGNOSIS — E11.3292: ICD-10-CM

## 2020-06-18 DIAGNOSIS — H35.373: ICD-10-CM

## 2020-06-18 DIAGNOSIS — H35.3131: ICD-10-CM

## 2020-06-18 PROCEDURE — 92014 COMPRE OPH EXAM EST PT 1/>: CPT | Performed by: OPHTHALMOLOGY

## 2020-06-18 PROCEDURE — 92235 FLUORESCEIN ANGRPH MLTIFRAME: CPT | Performed by: OPHTHALMOLOGY

## 2020-06-18 PROCEDURE — 92250 FUNDUS PHOTOGRAPHY W/I&R: CPT | Performed by: OPHTHALMOLOGY

## 2020-06-18 ASSESSMENT — REFRACTION_CURRENTRX
OS_OVR_VA: 20/
OD_SPHERE: +0.50
OD_OVR_VA: 20/
OS_AXIS: 074
OS_CYLINDER: -1.25
OS_ADD: +2.75
OD_AXIS: 104
OS_VPRISM_DIRECTION: PROGS
OD_VPRISM_DIRECTION: PROGS
OD_CYLINDER: -1.75
OD_ADD: +2.75
OS_SPHERE: +1.25

## 2020-06-18 ASSESSMENT — SPHEQUIV_DERIVED
OS_SPHEQUIV: 1.125
OD_SPHEQUIV: -0.125
OD_SPHEQUIV: -0.5
OS_SPHEQUIV: 0.875

## 2020-06-18 ASSESSMENT — AXIALLENGTH_DERIVED
OS_AL: 70.3729
OD_AL: 77.4141
OD_AL: 75.8723
OS_AL: 69.52

## 2020-06-18 ASSESSMENT — LID POSITION - COMMENTS
OS_COMMENTS: MRD1: 2/2
OD_COMMENTS: MRD1: 2/2

## 2020-06-18 ASSESSMENT — SUPERFICIAL PUNCTATE KERATITIS (SPK)
OS_SPK: 1+ 2+
OD_SPK: 1+ 2+

## 2020-06-18 ASSESSMENT — KERATOMETRY
OS_K2POWER_DIOPTERS: -1.25
OD_K2POWER_DIOPTERS: -2.00
OS_K1POWER_DIOPTERS: +1.00
OD_K1POWER_DIOPTERS: +0.75
OS_AXISANGLE_DEGREES: 064
OD_AXISANGLE_DEGREES: 118

## 2020-06-18 ASSESSMENT — REFRACTION_MANIFEST
OS_AXIS: 65
OD_VA1: 20/20
OD_AXIS: 105
OS_CYLINDER: -1.25
OD_SPHERE: +0.75
OS_VA1: 20/20
OS_ADD: +2.75
OD_CYLINDER: -1.75
OS_SPHERE: +1.50
OD_ADD: +2.75

## 2020-06-18 ASSESSMENT — LID POSITION - DERMATOCHALASIS
OS_DERMATOCHALASIS: LUL 2+
OD_DERMATOCHALASIS: RUL 2+

## 2020-06-18 ASSESSMENT — LID POSITION - PTOSIS
OS_PTOSIS: 1+
OD_PTOSIS: 1+

## 2020-06-18 ASSESSMENT — LACRIMAL DUCT - ASSESSMENT
OD_LACRIMAL_DUCT: RLL
OS_LACRIMAL_DUCT: LLL

## 2020-06-18 ASSESSMENT — LID POSITION - ECTROPION
OD_ECTROPION: RLL
OS_ECTROPION: LLL

## 2020-06-18 ASSESSMENT — REFRACTION_AUTOREFRACTION
OS_AXIS: 085
OD_CYLINDER: -2.00
OD_AXIS: 090
OD_SPHERE: +0.50
OS_SPHERE: +2.00
OS_CYLINDER: -1.75

## 2020-06-18 ASSESSMENT — DRY EYES - PHYSICIAN NOTES
OS_GENERALCOMMENTS: KSICCA
OD_GENERALCOMMENTS: KSICCA

## 2020-06-18 ASSESSMENT — PUNCTA - ASSESSMENT
OD_PUNCTA: SIL PLUG
OS_PUNCTA: SIL PLUG

## 2020-06-18 ASSESSMENT — CONFRONTATIONAL VISUAL FIELD TEST (CVF)
OD_FINDINGS: FULL
OS_FINDINGS: FULL

## 2020-06-18 ASSESSMENT — VISUAL ACUITY
OD_BCVA: 20/30-2
OS_BCVA: 20/30-2

## 2020-08-17 ENCOUNTER — RX ONLY (RX ONLY)
Age: 80
End: 2020-08-17

## 2020-08-17 RX ORDER — BROMFENAC SODIUM 0.7 MG/ML
SOLUTION/ DROPS OPHTHALMIC
Qty: 3 | Refills: 3 | Status: ACTIVE | OUTPATIENT

## 2020-09-02 ENCOUNTER — DOCTOR'S OFFICE (OUTPATIENT)
Dept: URBAN - NONMETROPOLITAN AREA CLINIC 1 | Facility: CLINIC | Age: 80
Setting detail: OPHTHALMOLOGY
End: 2020-09-02
Payer: COMMERCIAL

## 2020-09-02 VITALS — HEIGHT: 55 IN

## 2020-09-02 DIAGNOSIS — H04.121: ICD-10-CM

## 2020-09-02 DIAGNOSIS — H43.813: ICD-10-CM

## 2020-09-02 DIAGNOSIS — E11.3292: ICD-10-CM

## 2020-09-02 DIAGNOSIS — H04.122: ICD-10-CM

## 2020-09-02 DIAGNOSIS — E11.3211: ICD-10-CM

## 2020-09-02 DIAGNOSIS — H35.3131: ICD-10-CM

## 2020-09-02 DIAGNOSIS — H35.373: ICD-10-CM

## 2020-09-02 DIAGNOSIS — H40.013: ICD-10-CM

## 2020-09-02 PROCEDURE — 92250 FUNDUS PHOTOGRAPHY W/I&R: CPT | Performed by: OPHTHALMOLOGY

## 2020-09-02 PROCEDURE — 92014 COMPRE OPH EXAM EST PT 1/>: CPT | Performed by: OPHTHALMOLOGY

## 2020-09-02 PROCEDURE — 92083 EXTENDED VISUAL FIELD XM: CPT | Performed by: OPHTHALMOLOGY

## 2020-09-02 PROCEDURE — 76514 ECHO EXAM OF EYE THICKNESS: CPT | Performed by: OPHTHALMOLOGY

## 2020-09-02 ASSESSMENT — LID POSITION - COMMENTS
OD_COMMENTS: MRD1: 2/2
OS_COMMENTS: MRD1: 2/2

## 2020-09-02 ASSESSMENT — KERATOMETRY
OD_K2POWER_DIOPTERS: -2.00
OD_AXISANGLE_DEGREES: 118
OS_K1POWER_DIOPTERS: +1.00
OD_K1POWER_DIOPTERS: +0.75
OS_AXISANGLE_DEGREES: 064
OS_K2POWER_DIOPTERS: -1.25

## 2020-09-02 ASSESSMENT — LID POSITION - DERMATOCHALASIS
OS_DERMATOCHALASIS: LUL 2+
OD_DERMATOCHALASIS: RUL 2+

## 2020-09-02 ASSESSMENT — VISUAL ACUITY
OD_BCVA: 20/30-2
OS_BCVA: 20/25-1

## 2020-09-02 ASSESSMENT — REFRACTION_MANIFEST
OD_SPHERE: +0.75
OS_SPHERE: +1.50
OS_CYLINDER: -1.25
OS_ADD: +2.75
OD_CYLINDER: -1.75
OD_ADD: +2.75
OS_VA1: 20/20
OD_VA1: 20/20
OD_AXIS: 105
OS_AXIS: 65

## 2020-09-02 ASSESSMENT — CONFRONTATIONAL VISUAL FIELD TEST (CVF)
OS_FINDINGS: FULL
OD_FINDINGS: FULL

## 2020-09-02 ASSESSMENT — REFRACTION_CURRENTRX
OS_AXIS: 60
OD_SPHERE: +0.50
OS_VPRISM_DIRECTION: PROGS
OS_CYLINDER: -1.25
OD_VPRISM_DIRECTION: PROGS
OD_CYLINDER: -1.50
OS_ADD: +2.75
OD_AXIS: 107
OD_OVR_VA: 20/
OS_SPHERE: +1.75
OD_ADD: +2.75
OS_OVR_VA: 20/

## 2020-09-02 ASSESSMENT — PUNCTA - ASSESSMENT
OD_PUNCTA: SIL PLUG
OS_PUNCTA: SIL PLUG

## 2020-09-02 ASSESSMENT — SPHEQUIV_DERIVED
OD_SPHEQUIV: -0.125
OS_SPHEQUIV: 0.875
OS_SPHEQUIV: 1.125
OD_SPHEQUIV: 0

## 2020-09-02 ASSESSMENT — REFRACTION_AUTOREFRACTION
OS_SPHERE: +2.50
OS_CYLINDER: -2.75
OD_SPHERE: +1.25
OS_AXIS: 85
OD_AXIS: 92
OD_CYLINDER: -2.50

## 2020-09-02 ASSESSMENT — DRY EYES - PHYSICIAN NOTES
OD_GENERALCOMMENTS: KSICCA
OS_GENERALCOMMENTS: KSICCA

## 2020-09-02 ASSESSMENT — LID POSITION - PTOSIS
OS_PTOSIS: 1+
OD_PTOSIS: 1+

## 2020-09-02 ASSESSMENT — LACRIMAL DUCT - ASSESSMENT
OS_LACRIMAL_DUCT: LLL
OD_LACRIMAL_DUCT: RLL

## 2020-09-02 ASSESSMENT — AXIALLENGTH_DERIVED
OS_AL: 69.52
OD_AL: 75.37
OS_AL: 70.3729
OD_AL: 75.8723

## 2020-09-02 ASSESSMENT — SUPERFICIAL PUNCTATE KERATITIS (SPK)
OS_SPK: 1+ 2+
OD_SPK: 1+ 2+

## 2020-09-02 ASSESSMENT — LID POSITION - ECTROPION
OD_ECTROPION: RLL
OS_ECTROPION: LLL

## 2020-09-02 ASSESSMENT — PACHYMETRY
OD_CT_UM: 575
OD_CT_CORRECTION: -2
OS_CT_CORRECTION: -1
OS_CT_UM: 567

## 2020-10-15 ENCOUNTER — DOCTOR'S OFFICE (OUTPATIENT)
Dept: URBAN - NONMETROPOLITAN AREA CLINIC 1 | Facility: CLINIC | Age: 80
Setting detail: OPHTHALMOLOGY
End: 2020-10-15
Payer: COMMERCIAL

## 2020-10-15 DIAGNOSIS — H04.121: ICD-10-CM

## 2020-10-15 DIAGNOSIS — H35.3131: ICD-10-CM

## 2020-10-15 DIAGNOSIS — H35.373: ICD-10-CM

## 2020-10-15 DIAGNOSIS — E11.3211: ICD-10-CM

## 2020-10-15 DIAGNOSIS — H40.013: ICD-10-CM

## 2020-10-15 DIAGNOSIS — H43.813: ICD-10-CM

## 2020-10-15 DIAGNOSIS — E11.3292: ICD-10-CM

## 2020-10-15 DIAGNOSIS — H04.122: ICD-10-CM

## 2020-10-15 PROCEDURE — 83861 MICROFLUID ANALY TEARS: CPT | Performed by: OPHTHALMOLOGY

## 2020-10-15 PROCEDURE — 92134 CPTRZ OPH DX IMG PST SGM RTA: CPT | Performed by: OPHTHALMOLOGY

## 2020-10-15 PROCEDURE — 92014 COMPRE OPH EXAM EST PT 1/>: CPT | Performed by: OPHTHALMOLOGY

## 2020-10-15 PROCEDURE — 92201 OPSCPY EXTND RTA DRAW UNI/BI: CPT | Performed by: OPHTHALMOLOGY

## 2020-10-15 ASSESSMENT — REFRACTION_CURRENTRX
OS_CYLINDER: -1.25
OD_AXIS: 107
OS_SPHERE: +1.75
OD_VPRISM_DIRECTION: PROGS
OD_ADD: +2.75
OS_VPRISM_DIRECTION: PROGS
OS_ADD: +2.75
OD_CYLINDER: -1.50
OD_OVR_VA: 20/
OD_SPHERE: +0.50
OS_OVR_VA: 20/
OS_AXIS: 60

## 2020-10-15 ASSESSMENT — REFRACTION_MANIFEST
OD_ADD: +2.75
OD_VA1: 20/20
OD_CYLINDER: -1.75
OS_AXIS: 65
OD_AXIS: 105
OD_SPHERE: +0.75
OS_ADD: +2.75
OS_CYLINDER: -1.25
OS_VA1: 20/20
OS_SPHERE: +1.50

## 2020-10-15 ASSESSMENT — PUNCTA - ASSESSMENT
OD_PUNCTA: SIL PLUG
OS_PUNCTA: SIL PLUG

## 2020-10-15 ASSESSMENT — REFRACTION_AUTOREFRACTION
OD_CYLINDER: -2.50
OS_CYLINDER: -2.75
OS_SPHERE: +2.50
OD_AXIS: 92
OD_SPHERE: +1.25
OS_AXIS: 85

## 2020-10-15 ASSESSMENT — LID POSITION - DERMATOCHALASIS
OS_DERMATOCHALASIS: LUL 2+
OD_DERMATOCHALASIS: RUL 2+

## 2020-10-15 ASSESSMENT — DRY EYES - PHYSICIAN NOTES
OS_GENERALCOMMENTS: KSICCA
OD_GENERALCOMMENTS: KSICCA

## 2020-10-15 ASSESSMENT — VISUAL ACUITY
OD_BCVA: 20/30+2
OS_BCVA: 20/30+1

## 2020-10-15 ASSESSMENT — CONFRONTATIONAL VISUAL FIELD TEST (CVF)
OD_FINDINGS: FULL
OS_FINDINGS: FULL

## 2020-10-15 ASSESSMENT — LID POSITION - COMMENTS
OD_COMMENTS: MRD1: 2/2
OS_COMMENTS: MRD1: 2/2

## 2020-10-15 ASSESSMENT — SPHEQUIV_DERIVED
OS_SPHEQUIV: 1.125
OS_SPHEQUIV: 0.875
OD_SPHEQUIV: -0.125
OD_SPHEQUIV: 0

## 2020-10-15 ASSESSMENT — LID POSITION - ECTROPION
OD_ECTROPION: RLL
OS_ECTROPION: LLL

## 2020-10-15 ASSESSMENT — KERATOMETRY
OS_K2POWER_DIOPTERS: -1.25
OD_AXISANGLE_DEGREES: 118
OD_K2POWER_DIOPTERS: -2.00
OS_K1POWER_DIOPTERS: +1.00
OS_AXISANGLE_DEGREES: 064
OD_K1POWER_DIOPTERS: +0.75

## 2020-10-15 ASSESSMENT — LID POSITION - PTOSIS
OD_PTOSIS: 1+
OS_PTOSIS: 1+

## 2020-10-15 ASSESSMENT — LACRIMAL DUCT - ASSESSMENT
OD_LACRIMAL_DUCT: RLL
OS_LACRIMAL_DUCT: LLL

## 2020-10-15 ASSESSMENT — AXIALLENGTH_DERIVED
OS_AL: 70.3729
OS_AL: 69.52
OD_AL: 75.37
OD_AL: 75.8723

## 2020-10-15 ASSESSMENT — SUPERFICIAL PUNCTATE KERATITIS (SPK)
OD_SPK: 1+ 2+
OS_SPK: 1+ 2+

## 2020-11-02 ENCOUNTER — DOCTOR'S OFFICE (OUTPATIENT)
Dept: URBAN - NONMETROPOLITAN AREA CLINIC 1 | Facility: CLINIC | Age: 80
Setting detail: OPHTHALMOLOGY
End: 2020-11-02
Payer: COMMERCIAL

## 2020-11-02 VITALS — HEIGHT: 55 IN

## 2020-11-02 DIAGNOSIS — H35.373: ICD-10-CM

## 2020-11-02 DIAGNOSIS — E11.3211: ICD-10-CM

## 2020-11-02 DIAGNOSIS — H04.122: ICD-10-CM

## 2020-11-02 DIAGNOSIS — E11.3292: ICD-10-CM

## 2020-11-02 DIAGNOSIS — H43.813: ICD-10-CM

## 2020-11-02 DIAGNOSIS — H04.121: ICD-10-CM

## 2020-11-02 DIAGNOSIS — H40.013: ICD-10-CM

## 2020-11-02 DIAGNOSIS — H35.3131: ICD-10-CM

## 2020-11-02 PROCEDURE — 92250 FUNDUS PHOTOGRAPHY W/I&R: CPT | Performed by: OPHTHALMOLOGY

## 2020-11-02 PROCEDURE — 92235 FLUORESCEIN ANGRPH MLTIFRAME: CPT | Performed by: OPHTHALMOLOGY

## 2020-11-02 PROCEDURE — 92014 COMPRE OPH EXAM EST PT 1/>: CPT | Performed by: OPHTHALMOLOGY

## 2020-11-02 ASSESSMENT — REFRACTION_CURRENTRX
OS_OVR_VA: 20/
OS_ADD: +2.75
OS_AXIS: 60
OD_OVR_VA: 20/
OS_CYLINDER: -1.25
OD_ADD: +2.75
OD_CYLINDER: -1.50
OD_AXIS: 107
OS_SPHERE: +1.75
OD_SPHERE: +0.50
OS_VPRISM_DIRECTION: PROGS
OD_VPRISM_DIRECTION: PROGS

## 2020-11-02 ASSESSMENT — LID POSITION - PTOSIS
OS_PTOSIS: 1+
OD_PTOSIS: 1+

## 2020-11-02 ASSESSMENT — REFRACTION_MANIFEST
OS_VA1: 20/20
OS_CYLINDER: -1.25
OS_SPHERE: +1.50
OD_CYLINDER: -1.75
OD_SPHERE: +0.75
OD_AXIS: 105
OS_AXIS: 65
OD_VA1: 20/20
OD_ADD: +2.75
OS_ADD: +2.75

## 2020-11-02 ASSESSMENT — SPHEQUIV_DERIVED
OS_SPHEQUIV: 1.125
OD_SPHEQUIV: 0
OD_SPHEQUIV: -0.125
OS_SPHEQUIV: 0.875

## 2020-11-02 ASSESSMENT — LID POSITION - ECTROPION
OD_ECTROPION: RLL
OS_ECTROPION: LLL

## 2020-11-02 ASSESSMENT — DRY EYES - PHYSICIAN NOTES
OS_GENERALCOMMENTS: KSICCA
OD_GENERALCOMMENTS: KSICCA

## 2020-11-02 ASSESSMENT — AXIALLENGTH_DERIVED
OD_AL: 75.37
OS_AL: 69.52
OD_AL: 75.8723
OS_AL: 70.3729

## 2020-11-02 ASSESSMENT — KERATOMETRY
OS_K1POWER_DIOPTERS: +1.00
OD_K1POWER_DIOPTERS: +0.75
OD_K2POWER_DIOPTERS: -2.00
OD_AXISANGLE_DEGREES: 118
OS_K2POWER_DIOPTERS: -1.25
OS_AXISANGLE_DEGREES: 064

## 2020-11-02 ASSESSMENT — VISUAL ACUITY
OD_BCVA: 20/25-1
OS_BCVA: 20/40+2

## 2020-11-02 ASSESSMENT — PUNCTA - ASSESSMENT
OD_PUNCTA: SIL PLUG
OS_PUNCTA: SIL PLUG

## 2020-11-02 ASSESSMENT — LID POSITION - COMMENTS
OS_COMMENTS: MRD1: 2/2
OD_COMMENTS: MRD1: 2/2

## 2020-11-02 ASSESSMENT — LID POSITION - DERMATOCHALASIS
OD_DERMATOCHALASIS: RUL 2+
OS_DERMATOCHALASIS: LUL 2+

## 2020-11-02 ASSESSMENT — REFRACTION_AUTOREFRACTION
OS_CYLINDER: -2.75
OD_CYLINDER: -2.50
OD_AXIS: 92
OD_SPHERE: +1.25
OS_SPHERE: +2.50
OS_AXIS: 85

## 2020-11-02 ASSESSMENT — CONFRONTATIONAL VISUAL FIELD TEST (CVF)
OS_FINDINGS: FULL
OD_FINDINGS: FULL

## 2020-11-02 ASSESSMENT — LACRIMAL DUCT - ASSESSMENT
OS_LACRIMAL_DUCT: LLL
OD_LACRIMAL_DUCT: RLL

## 2020-11-02 ASSESSMENT — SUPERFICIAL PUNCTATE KERATITIS (SPK)
OD_SPK: 1+ 2+
OS_SPK: 1+ 2+

## 2020-11-30 ENCOUNTER — DOCTOR'S OFFICE (OUTPATIENT)
Dept: URBAN - NONMETROPOLITAN AREA CLINIC 1 | Facility: CLINIC | Age: 80
Setting detail: OPHTHALMOLOGY
End: 2020-11-30
Payer: COMMERCIAL

## 2020-11-30 VITALS — HEIGHT: 55 IN

## 2020-11-30 DIAGNOSIS — E11.3292: ICD-10-CM

## 2020-11-30 DIAGNOSIS — H35.3131: ICD-10-CM

## 2020-11-30 DIAGNOSIS — H43.813: ICD-10-CM

## 2020-11-30 DIAGNOSIS — H04.121: ICD-10-CM

## 2020-11-30 DIAGNOSIS — H35.373: ICD-10-CM

## 2020-11-30 DIAGNOSIS — H04.122: ICD-10-CM

## 2020-11-30 DIAGNOSIS — H40.013: ICD-10-CM

## 2020-11-30 DIAGNOSIS — E11.3211: ICD-10-CM

## 2020-11-30 PROCEDURE — 83861 MICROFLUID ANALY TEARS: CPT | Performed by: OPHTHALMOLOGY

## 2020-11-30 PROCEDURE — 99213 OFFICE O/P EST LOW 20 MIN: CPT | Performed by: OPHTHALMOLOGY

## 2020-11-30 PROCEDURE — 92202 OPSCPY EXTND ON/MAC DRAW: CPT | Performed by: OPHTHALMOLOGY

## 2020-11-30 PROCEDURE — 92134 CPTRZ OPH DX IMG PST SGM RTA: CPT | Performed by: OPHTHALMOLOGY

## 2020-11-30 ASSESSMENT — REFRACTION_CURRENTRX
OS_AXIS: 60
OS_VPRISM_DIRECTION: PROGS
OS_SPHERE: +1.75
OS_ADD: +2.75
OS_OVR_VA: 20/
OD_OVR_VA: 20/
OD_AXIS: 107
OD_VPRISM_DIRECTION: PROGS
OD_SPHERE: +0.50
OS_CYLINDER: -1.25
OD_CYLINDER: -1.50
OD_ADD: +2.75

## 2020-11-30 ASSESSMENT — REFRACTION_AUTOREFRACTION
OD_AXIS: 92
OS_CYLINDER: -2.75
OD_SPHERE: +1.25
OS_SPHERE: +2.50
OD_CYLINDER: -2.50
OS_AXIS: 85

## 2020-11-30 ASSESSMENT — VISUAL ACUITY
OS_BCVA: 20/50+2
OD_BCVA: 20/30-2

## 2020-11-30 ASSESSMENT — PUNCTA - ASSESSMENT
OD_PUNCTA: SIL PLUG
OS_PUNCTA: SIL PLUG

## 2020-11-30 ASSESSMENT — KERATOMETRY
OS_K2POWER_DIOPTERS: -1.25
OS_K1POWER_DIOPTERS: +1.00
OD_AXISANGLE_DEGREES: 118
OD_K2POWER_DIOPTERS: -2.00
OD_K1POWER_DIOPTERS: +0.75
OS_AXISANGLE_DEGREES: 064

## 2020-11-30 ASSESSMENT — REFRACTION_MANIFEST
OS_SPHERE: +1.50
OD_ADD: +2.75
OD_CYLINDER: -1.75
OD_VA1: 20/20
OS_CYLINDER: -1.25
OS_ADD: +2.75
OS_VA1: 20/20
OS_AXIS: 65
OD_SPHERE: +0.75
OD_AXIS: 105

## 2020-11-30 ASSESSMENT — AXIALLENGTH_DERIVED
OD_AL: 75.8723
OD_AL: 75.37
OS_AL: 70.3729
OS_AL: 69.52

## 2020-11-30 ASSESSMENT — SUPERFICIAL PUNCTATE KERATITIS (SPK)
OS_SPK: 1+ 2+
OD_SPK: 1+ 2+

## 2020-11-30 ASSESSMENT — LACRIMAL DUCT - ASSESSMENT
OD_LACRIMAL_DUCT: RLL
OS_LACRIMAL_DUCT: LLL

## 2020-11-30 ASSESSMENT — LID POSITION - ECTROPION
OS_ECTROPION: LLL
OD_ECTROPION: RLL

## 2020-11-30 ASSESSMENT — LID POSITION - DERMATOCHALASIS
OS_DERMATOCHALASIS: LUL 2+
OD_DERMATOCHALASIS: RUL 2+

## 2020-11-30 ASSESSMENT — CONFRONTATIONAL VISUAL FIELD TEST (CVF)
OD_FINDINGS: FULL
OS_FINDINGS: FULL

## 2020-11-30 ASSESSMENT — DRY EYES - PHYSICIAN NOTES
OD_GENERALCOMMENTS: KSICCA
OS_GENERALCOMMENTS: KSICCA

## 2020-11-30 ASSESSMENT — SPHEQUIV_DERIVED
OD_SPHEQUIV: -0.125
OS_SPHEQUIV: 1.125
OS_SPHEQUIV: 0.875
OD_SPHEQUIV: 0

## 2020-11-30 ASSESSMENT — LID POSITION - COMMENTS
OS_COMMENTS: MRD1: 2/2
OD_COMMENTS: MRD1: 2/2

## 2020-11-30 ASSESSMENT — LID POSITION - PTOSIS
OS_PTOSIS: 1+
OD_PTOSIS: 1+

## 2021-03-03 ENCOUNTER — DOCTOR'S OFFICE (OUTPATIENT)
Dept: URBAN - NONMETROPOLITAN AREA CLINIC 1 | Facility: CLINIC | Age: 81
Setting detail: OPHTHALMOLOGY
End: 2021-03-03
Payer: COMMERCIAL

## 2021-03-03 ENCOUNTER — RX ONLY (RX ONLY)
Age: 81
End: 2021-03-03

## 2021-03-03 VITALS — HEIGHT: 55 IN

## 2021-03-03 DIAGNOSIS — H40.013: ICD-10-CM

## 2021-03-03 DIAGNOSIS — H04.121: ICD-10-CM

## 2021-03-03 DIAGNOSIS — H35.373: ICD-10-CM

## 2021-03-03 DIAGNOSIS — H43.813: ICD-10-CM

## 2021-03-03 DIAGNOSIS — H35.3131: ICD-10-CM

## 2021-03-03 DIAGNOSIS — H04.122: ICD-10-CM

## 2021-03-03 DIAGNOSIS — E11.3211: ICD-10-CM

## 2021-03-03 DIAGNOSIS — E11.3292: ICD-10-CM

## 2021-03-03 PROCEDURE — 92133 CPTRZD OPH DX IMG PST SGM ON: CPT | Performed by: OPHTHALMOLOGY

## 2021-03-03 PROCEDURE — 83861 MICROFLUID ANALY TEARS: CPT | Performed by: OPHTHALMOLOGY

## 2021-03-03 PROCEDURE — 92014 COMPRE OPH EXAM EST PT 1/>: CPT | Performed by: OPHTHALMOLOGY

## 2021-03-03 RX ORDER — BRIMONIDINE TARTRATE 1 MG/ML
SOLUTION/ DROPS OPHTHALMIC
Qty: 1 | Refills: 2 | Status: ACTIVE | OUTPATIENT

## 2021-03-03 ASSESSMENT — LID POSITION - PTOSIS
OD_PTOSIS: 1+
OS_PTOSIS: 1+

## 2021-03-03 ASSESSMENT — TONOMETRY
OD_IOP_MMHG: 16
OS_IOP_MMHG: 15

## 2021-03-03 ASSESSMENT — LID POSITION - DERMATOCHALASIS
OS_DERMATOCHALASIS: LUL 2+
OD_DERMATOCHALASIS: RUL 2+

## 2021-03-03 ASSESSMENT — REFRACTION_CURRENTRX
OS_OVR_VA: 20/
OS_ADD: +2.75
OD_SPHERE: +0.50
OS_SPHERE: +1.75
OD_VPRISM_DIRECTION: PROGS
OD_ADD: +2.75
OS_VPRISM_DIRECTION: PROGS
OD_AXIS: 107
OD_OVR_VA: 20/
OS_CYLINDER: -1.25
OD_CYLINDER: -1.50
OS_AXIS: 60

## 2021-03-03 ASSESSMENT — REFRACTION_AUTOREFRACTION
OS_SPHERE: +1.75
OS_CYLINDER: -2.50
OD_CYLINDER: -2.50
OS_AXIS: 075
OD_AXIS: 090
OD_SPHERE: +0.25

## 2021-03-03 ASSESSMENT — PACHYMETRY
OS_CT_CORRECTION: -1
OD_CT_CORRECTION: -2
OD_CT_UM: 575
OS_CT_UM: 567

## 2021-03-03 ASSESSMENT — LID POSITION - ECTROPION
OD_ECTROPION: RLL
OS_ECTROPION: LLL

## 2021-03-03 ASSESSMENT — DRY EYES - PHYSICIAN NOTES
OD_GENERALCOMMENTS: KSICCA
OS_GENERALCOMMENTS: KSICCA

## 2021-03-03 ASSESSMENT — LACRIMAL DUCT - ASSESSMENT
OD_LACRIMAL_DUCT: RLL
OS_LACRIMAL_DUCT: LLL

## 2021-03-03 ASSESSMENT — SPHEQUIV_DERIVED
OD_SPHEQUIV: -1
OS_SPHEQUIV: 0.875
OD_SPHEQUIV: -0.125
OS_SPHEQUIV: 0.5

## 2021-03-03 ASSESSMENT — CONFRONTATIONAL VISUAL FIELD TEST (CVF)
OD_FINDINGS: FULL
OS_FINDINGS: FULL

## 2021-03-03 ASSESSMENT — KERATOMETRY
OS_AXISANGLE_DEGREES: 064
OD_K1POWER_DIOPTERS: +0.75
OD_K2POWER_DIOPTERS: -2.00
OD_AXISANGLE_DEGREES: 118
OS_K1POWER_DIOPTERS: +1.00
OS_K2POWER_DIOPTERS: -1.25

## 2021-03-03 ASSESSMENT — REFRACTION_MANIFEST
OS_ADD: +2.75
OD_AXIS: 105
OD_SPHERE: +0.75
OS_VA1: 20/20
OD_ADD: +2.75
OD_CYLINDER: -1.75
OS_AXIS: 65
OS_CYLINDER: -1.25
OD_VA1: 20/20
OS_SPHERE: +1.50

## 2021-03-03 ASSESSMENT — AXIALLENGTH_DERIVED
OS_AL: 70.3729
OD_AL: 79.5701
OS_AL: 71.6974
OD_AL: 75.8723

## 2021-03-03 ASSESSMENT — SUPERFICIAL PUNCTATE KERATITIS (SPK)
OS_SPK: 1+ 2+
OD_SPK: 1+ 2+

## 2021-03-03 ASSESSMENT — VISUAL ACUITY
OD_BCVA: 20/40
OS_BCVA: 20/40

## 2021-03-03 ASSESSMENT — LID POSITION - COMMENTS
OD_COMMENTS: MRD1: 2/2
OS_COMMENTS: MRD1: 2/2

## 2021-03-03 ASSESSMENT — PUNCTA - ASSESSMENT
OD_PUNCTA: SIL PLUG
OS_PUNCTA: SIL PLUG

## 2021-03-18 ENCOUNTER — DOCTOR'S OFFICE (OUTPATIENT)
Dept: URBAN - NONMETROPOLITAN AREA CLINIC 1 | Facility: CLINIC | Age: 81
Setting detail: OPHTHALMOLOGY
End: 2021-03-18
Payer: COMMERCIAL

## 2021-03-18 ENCOUNTER — RX ONLY (RX ONLY)
Age: 81
End: 2021-03-18

## 2021-03-18 VITALS — HEIGHT: 55 IN

## 2021-03-18 DIAGNOSIS — E11.3292: ICD-10-CM

## 2021-03-18 DIAGNOSIS — H35.373: ICD-10-CM

## 2021-03-18 DIAGNOSIS — E11.3211: ICD-10-CM

## 2021-03-18 DIAGNOSIS — H35.3131: ICD-10-CM

## 2021-03-18 PROCEDURE — 92014 COMPRE OPH EXAM EST PT 1/>: CPT | Performed by: OPHTHALMOLOGY

## 2021-03-18 PROCEDURE — 92202 OPSCPY EXTND ON/MAC DRAW: CPT | Performed by: OPHTHALMOLOGY

## 2021-03-18 ASSESSMENT — LID POSITION - COMMENTS
OD_COMMENTS: MRD1: 2/2
OS_COMMENTS: MRD1: 2/2

## 2021-03-18 ASSESSMENT — REFRACTION_CURRENTRX
OD_OVR_VA: 20/
OS_CYLINDER: -1.25
OD_ADD: +2.75
OD_AXIS: 107
OS_VPRISM_DIRECTION: PROGS
OS_OVR_VA: 20/
OD_CYLINDER: -1.50
OD_VPRISM_DIRECTION: PROGS
OS_SPHERE: +1.75
OS_AXIS: 60
OS_ADD: +2.75
OD_SPHERE: +0.50

## 2021-03-18 ASSESSMENT — PUNCTA - ASSESSMENT
OS_PUNCTA: SIL PLUG
OD_PUNCTA: SIL PLUG

## 2021-03-18 ASSESSMENT — SUPERFICIAL PUNCTATE KERATITIS (SPK)
OS_SPK: 1+ 2+
OD_SPK: 1+ 2+

## 2021-03-18 ASSESSMENT — REFRACTION_MANIFEST
OS_VA1: 20/20
OS_CYLINDER: -1.25
OD_SPHERE: +0.75
OS_SPHERE: +1.50
OD_CYLINDER: -1.75
OD_AXIS: 105
OD_VA1: 20/20
OD_ADD: +2.75
OS_AXIS: 65
OS_ADD: +2.75

## 2021-03-18 ASSESSMENT — SPHEQUIV_DERIVED
OD_SPHEQUIV: -0.125
OS_SPHEQUIV: 0.5
OS_SPHEQUIV: 0.875
OD_SPHEQUIV: -1

## 2021-03-18 ASSESSMENT — REFRACTION_AUTOREFRACTION
OD_CYLINDER: -2.50
OS_AXIS: 075
OS_SPHERE: +1.75
OS_CYLINDER: -2.50
OD_SPHERE: +0.25
OD_AXIS: 090

## 2021-03-18 ASSESSMENT — AXIALLENGTH_DERIVED
OS_AL: 71.6974
OD_AL: 75.8723
OS_AL: 70.3729
OD_AL: 79.5701

## 2021-03-18 ASSESSMENT — KERATOMETRY
OS_K1POWER_DIOPTERS: +1.00
OD_K2POWER_DIOPTERS: -2.00
OS_AXISANGLE_DEGREES: 064
OD_AXISANGLE_DEGREES: 118
OS_K2POWER_DIOPTERS: -1.25
OD_K1POWER_DIOPTERS: +0.75

## 2021-03-18 ASSESSMENT — CONFRONTATIONAL VISUAL FIELD TEST (CVF)
OS_FINDINGS: FULL
OD_FINDINGS: FULL

## 2021-03-18 ASSESSMENT — VISUAL ACUITY
OD_BCVA: 20/40
OS_BCVA: 20/40

## 2021-03-18 ASSESSMENT — DRY EYES - PHYSICIAN NOTES
OD_GENERALCOMMENTS: KSICCA
OS_GENERALCOMMENTS: KSICCA

## 2021-03-18 ASSESSMENT — LID POSITION - ECTROPION
OD_ECTROPION: RLL
OS_ECTROPION: LLL

## 2021-03-18 ASSESSMENT — LID POSITION - DERMATOCHALASIS
OS_DERMATOCHALASIS: LUL 2+
OD_DERMATOCHALASIS: RUL 2+

## 2021-03-18 ASSESSMENT — LACRIMAL DUCT - ASSESSMENT
OD_LACRIMAL_DUCT: RLL
OS_LACRIMAL_DUCT: LLL

## 2021-03-18 ASSESSMENT — LID POSITION - PTOSIS
OD_PTOSIS: 1+
OS_PTOSIS: 1+

## 2021-05-06 ENCOUNTER — DOCTOR'S OFFICE (OUTPATIENT)
Dept: URBAN - NONMETROPOLITAN AREA CLINIC 1 | Facility: CLINIC | Age: 81
Setting detail: OPHTHALMOLOGY
End: 2021-05-06
Payer: COMMERCIAL

## 2021-05-06 DIAGNOSIS — H40.013: ICD-10-CM

## 2021-05-06 DIAGNOSIS — E11.3292: ICD-10-CM

## 2021-05-06 DIAGNOSIS — H35.373: ICD-10-CM

## 2021-05-06 DIAGNOSIS — H04.122: ICD-10-CM

## 2021-05-06 DIAGNOSIS — H04.121: ICD-10-CM

## 2021-05-06 DIAGNOSIS — H35.3131: ICD-10-CM

## 2021-05-06 DIAGNOSIS — E11.3211: ICD-10-CM

## 2021-05-06 DIAGNOSIS — H43.813: ICD-10-CM

## 2021-05-06 PROCEDURE — 92134 CPTRZ OPH DX IMG PST SGM RTA: CPT | Performed by: OPHTHALMOLOGY

## 2021-05-06 PROCEDURE — 92201 OPSCPY EXTND RTA DRAW UNI/BI: CPT | Performed by: OPHTHALMOLOGY

## 2021-05-06 PROCEDURE — 92014 COMPRE OPH EXAM EST PT 1/>: CPT | Performed by: OPHTHALMOLOGY

## 2021-05-06 ASSESSMENT — AXIALLENGTH_DERIVED
OD_AL: 75.8723
OS_AL: 71.6974
OS_AL: 70.3729
OD_AL: 79.5701

## 2021-05-06 ASSESSMENT — KERATOMETRY
OD_AXISANGLE_DEGREES: 118
OD_K2POWER_DIOPTERS: -2.00
OS_K1POWER_DIOPTERS: +1.00
OS_AXISANGLE_DEGREES: 064
OS_K2POWER_DIOPTERS: -1.25
OD_K1POWER_DIOPTERS: +0.75

## 2021-05-06 ASSESSMENT — REFRACTION_AUTOREFRACTION
OD_AXIS: 090
OD_CYLINDER: -2.50
OS_CYLINDER: -2.50
OD_SPHERE: +0.25
OS_AXIS: 075
OS_SPHERE: +1.75

## 2021-05-06 ASSESSMENT — REFRACTION_CURRENTRX
OD_SPHERE: +0.50
OD_ADD: +2.75
OS_VPRISM_DIRECTION: PROGS
OS_AXIS: 60
OS_OVR_VA: 20/
OD_VPRISM_DIRECTION: PROGS
OD_OVR_VA: 20/
OD_CYLINDER: -1.50
OS_SPHERE: +1.75
OD_AXIS: 107
OS_ADD: +2.75
OS_CYLINDER: -1.25

## 2021-05-06 ASSESSMENT — SPHEQUIV_DERIVED
OD_SPHEQUIV: -0.125
OS_SPHEQUIV: 0.875
OD_SPHEQUIV: -1
OS_SPHEQUIV: 0.5

## 2021-05-06 ASSESSMENT — LID POSITION - PTOSIS
OD_PTOSIS: 1+
OS_PTOSIS: 1+

## 2021-05-06 ASSESSMENT — DRY EYES - PHYSICIAN NOTES
OD_GENERALCOMMENTS: KSICCA
OS_GENERALCOMMENTS: KSICCA

## 2021-05-06 ASSESSMENT — LACRIMAL DUCT - ASSESSMENT
OS_LACRIMAL_DUCT: LLL
OD_LACRIMAL_DUCT: RLL

## 2021-05-06 ASSESSMENT — REFRACTION_MANIFEST
OS_SPHERE: +1.50
OD_CYLINDER: -1.75
OS_CYLINDER: -1.25
OS_VA1: 20/20
OS_ADD: +2.75
OS_AXIS: 65
OD_ADD: +2.75
OD_VA1: 20/20
OD_AXIS: 105
OD_SPHERE: +0.75

## 2021-05-06 ASSESSMENT — LID POSITION - DERMATOCHALASIS
OD_DERMATOCHALASIS: RUL 2+
OS_DERMATOCHALASIS: LUL 2+

## 2021-05-06 ASSESSMENT — LID POSITION - ECTROPION
OS_ECTROPION: LLL
OD_ECTROPION: RLL

## 2021-05-06 ASSESSMENT — CONFRONTATIONAL VISUAL FIELD TEST (CVF)
OD_FINDINGS: FULL
OS_FINDINGS: FULL

## 2021-05-06 ASSESSMENT — PUNCTA - ASSESSMENT
OD_PUNCTA: SIL PLUG
OS_PUNCTA: SIL PLUG

## 2021-05-06 ASSESSMENT — VISUAL ACUITY
OS_BCVA: 20/30-2
OD_BCVA: 20/30-2

## 2021-05-06 ASSESSMENT — LID POSITION - COMMENTS
OS_COMMENTS: MRD1: 2/2
OD_COMMENTS: MRD1: 2/2

## 2021-05-06 ASSESSMENT — SUPERFICIAL PUNCTATE KERATITIS (SPK)
OD_SPK: 1+ 2+
OS_SPK: 1+ 2+

## 2021-05-10 ENCOUNTER — RX ONLY (RX ONLY)
Age: 81
End: 2021-05-10

## 2021-05-10 RX ORDER — BRIMONIDINE TARTRATE 1 MG/ML
SOLUTION/ DROPS OPHTHALMIC
Qty: 15 | Refills: 4 | Status: ACTIVE | OUTPATIENT

## 2021-05-21 ENCOUNTER — DOCTOR'S OFFICE (OUTPATIENT)
Dept: URBAN - NONMETROPOLITAN AREA CLINIC 1 | Facility: CLINIC | Age: 81
Setting detail: OPHTHALMOLOGY
End: 2021-05-21
Payer: COMMERCIAL

## 2021-05-21 VITALS — HEIGHT: 55 IN

## 2021-05-21 DIAGNOSIS — E11.3292: ICD-10-CM

## 2021-05-21 DIAGNOSIS — H35.3131: ICD-10-CM

## 2021-05-21 DIAGNOSIS — E11.3211: ICD-10-CM

## 2021-05-21 PROCEDURE — 92250 FUNDUS PHOTOGRAPHY W/I&R: CPT | Performed by: OPHTHALMOLOGY

## 2021-05-21 PROCEDURE — 92014 COMPRE OPH EXAM EST PT 1/>: CPT | Performed by: OPHTHALMOLOGY

## 2021-05-21 PROCEDURE — 92235 FLUORESCEIN ANGRPH MLTIFRAME: CPT | Performed by: OPHTHALMOLOGY

## 2021-05-21 ASSESSMENT — SUPERFICIAL PUNCTATE KERATITIS (SPK)
OS_SPK: 1+ 2+
OD_SPK: 1+ 2+

## 2021-05-21 ASSESSMENT — KERATOMETRY
OS_K1POWER_DIOPTERS: +1.00
OS_AXISANGLE_DEGREES: 064
OD_K1POWER_DIOPTERS: +0.75
OS_K2POWER_DIOPTERS: -1.25
OD_K2POWER_DIOPTERS: -2.00
OD_AXISANGLE_DEGREES: 118

## 2021-05-21 ASSESSMENT — SPHEQUIV_DERIVED
OD_SPHEQUIV: -1
OS_SPHEQUIV: 0.5
OD_SPHEQUIV: -0.125
OS_SPHEQUIV: 0.875

## 2021-05-21 ASSESSMENT — REFRACTION_AUTOREFRACTION
OD_CYLINDER: -2.50
OD_AXIS: 090
OD_SPHERE: +0.25
OS_AXIS: 075
OS_CYLINDER: -2.50
OS_SPHERE: +1.75

## 2021-05-21 ASSESSMENT — REFRACTION_CURRENTRX
OS_ADD: +2.75
OS_CYLINDER: -1.25
OS_SPHERE: +1.75
OD_CYLINDER: -1.50
OD_OVR_VA: 20/
OD_SPHERE: +0.50
OS_OVR_VA: 20/
OS_AXIS: 60
OS_VPRISM_DIRECTION: PROGS
OD_VPRISM_DIRECTION: PROGS
OD_ADD: +2.75
OD_AXIS: 107

## 2021-05-21 ASSESSMENT — VISUAL ACUITY
OD_BCVA: 20/50
OS_BCVA: 20/60

## 2021-05-21 ASSESSMENT — REFRACTION_MANIFEST
OS_CYLINDER: -1.25
OS_ADD: +2.75
OD_ADD: +2.75
OD_AXIS: 105
OS_VA1: 20/20
OD_VA1: 20/20
OD_SPHERE: +0.75
OD_CYLINDER: -1.75
OS_SPHERE: +1.50
OS_AXIS: 65

## 2021-05-21 ASSESSMENT — CONFRONTATIONAL VISUAL FIELD TEST (CVF)
OS_FINDINGS: FULL
OD_FINDINGS: FULL

## 2021-05-21 ASSESSMENT — LID POSITION - PTOSIS
OS_PTOSIS: 1+
OD_PTOSIS: 1+

## 2021-05-21 ASSESSMENT — LID POSITION - DERMATOCHALASIS
OD_DERMATOCHALASIS: RUL 2+
OS_DERMATOCHALASIS: LUL 2+

## 2021-05-21 ASSESSMENT — LID POSITION - COMMENTS
OD_COMMENTS: MRD1: 2/2
OS_COMMENTS: MRD1: 2/2

## 2021-05-21 ASSESSMENT — PUNCTA - ASSESSMENT
OD_PUNCTA: SIL PLUG
OS_PUNCTA: SIL PLUG

## 2021-05-21 ASSESSMENT — AXIALLENGTH_DERIVED
OD_AL: 75.8723
OS_AL: 70.3729
OD_AL: 79.5701
OS_AL: 71.6974

## 2021-05-21 ASSESSMENT — DRY EYES - PHYSICIAN NOTES
OS_GENERALCOMMENTS: KSICCA
OD_GENERALCOMMENTS: KSICCA

## 2021-05-21 ASSESSMENT — LACRIMAL DUCT - ASSESSMENT
OD_LACRIMAL_DUCT: RLL
OS_LACRIMAL_DUCT: LLL

## 2021-05-21 ASSESSMENT — LID POSITION - ECTROPION
OD_ECTROPION: RLL
OS_ECTROPION: LLL

## 2021-06-15 ENCOUNTER — DOCTOR'S OFFICE (OUTPATIENT)
Dept: URBAN - NONMETROPOLITAN AREA CLINIC 1 | Facility: CLINIC | Age: 81
Setting detail: OPHTHALMOLOGY
End: 2021-06-15

## 2021-06-15 VITALS — HEIGHT: 55 IN

## 2021-06-15 DIAGNOSIS — H52.223: ICD-10-CM

## 2021-06-15 DIAGNOSIS — H52.4: ICD-10-CM

## 2021-06-15 PROBLEM — Z96.1 PSEUDOPHAKIA ; BOTH EYES: Status: ACTIVE | Noted: 2017-01-31

## 2021-06-15 PROBLEM — H43.813 POSTERIOR VITREOUS DETACHMENT; ,, BOTH EYES: Status: ACTIVE | Noted: 2020-06-04

## 2021-06-15 PROBLEM — H47.012: Status: ACTIVE | Noted: 2021-03-03

## 2021-06-15 PROBLEM — H02.834 DERMATOCHALASIS; RIGHT UPPER LID, LEFT UPPER LID: Status: ACTIVE | Noted: 2018-08-06

## 2021-06-15 PROBLEM — H35.3131 ARMD DRY; BOTH EYES EARLY: Status: ACTIVE | Noted: 2017-09-28

## 2021-06-15 PROBLEM — H02.423 PTOSIS; BOTH EYES: Status: ACTIVE | Noted: 2018-10-29

## 2021-06-15 PROBLEM — H02.413 PTOSIS; BOTH EYES: Status: ACTIVE | Noted: 2018-10-29

## 2021-06-15 PROBLEM — H02.433 PTOSIS; BOTH EYES: Status: ACTIVE | Noted: 2018-10-29

## 2021-06-15 PROBLEM — H04.121 DRY EYE; RIGHT EYE, LEFT EYE: Status: ACTIVE | Noted: 2018-08-07

## 2021-06-15 PROBLEM — H40.013 GLAUCOMA SUSPECT, LOW RISK; BOTH EYES: Status: ACTIVE | Noted: 2018-08-07

## 2021-06-15 PROBLEM — H02.403 PTOSIS; BOTH EYES: Status: ACTIVE | Noted: 2018-10-29

## 2021-06-15 PROBLEM — E11.3211 DM TYPE 2; RIGHT MILD WITH ME, LEFT MILD WITHOUT ME: Status: ACTIVE | Noted: 2019-02-14

## 2021-06-15 PROBLEM — H02.102 ECTROPION; RIGHT LOWER LID, LEFT LOWER LID: Status: ACTIVE | Noted: 2020-02-25

## 2021-06-15 PROBLEM — H02.105 ECTROPION; RIGHT LOWER LID, LEFT LOWER LID: Status: ACTIVE | Noted: 2020-02-25

## 2021-06-15 PROBLEM — H04.122 DRY EYE; RIGHT EYE, LEFT EYE: Status: ACTIVE | Noted: 2018-08-07

## 2021-06-15 PROBLEM — H52.03 HYPERMETROPIA; BOTH EYES: Status: ACTIVE | Noted: 2018-09-06

## 2021-06-15 PROBLEM — H35.37 EPIRETINAL MEMBRANE: Status: ACTIVE | Noted: 2017-01-31

## 2021-06-15 PROBLEM — E11.3292 DM TYPE 2; RIGHT MILD WITH ME, LEFT MILD WITHOUT ME: Status: ACTIVE | Noted: 2019-02-14

## 2021-06-15 PROBLEM — H02.831 DERMATOCHALASIS; RIGHT UPPER LID, LEFT UPPER LID: Status: ACTIVE | Noted: 2018-08-06

## 2021-06-15 PROCEDURE — 92015 DETERMINE REFRACTIVE STATE: CPT | Performed by: OPTOMETRIST

## 2021-06-15 ASSESSMENT — REFRACTION_MANIFEST
OS_ADD: +2.75
OS_VA1: 20/25-2
OD_ADD: +2.75
OS_VA2: 20/25-2
OD_VA2: 20/25-2
OS_CYLINDER: -1.25
OD_CYLINDER: -1.75
OS_SPHERE: +1.50
OD_AXIS: 105
OD_VA1: 20/25-2
OD_SPHERE: +0.75
OS_AXIS: 65

## 2021-06-15 ASSESSMENT — REFRACTION_CURRENTRX
OD_ADD: +2.50
OS_AXIS: 067
OD_SPHERE: +0.25
OS_CYLINDER: -1.00
OS_SPHERE: +1.25
OS_ADD: +2.50
OS_VPRISM_DIRECTION: PROGS
OD_OVR_VA: 20/
OD_VPRISM_DIRECTION: PROGS
OD_AXIS: 102
OD_CYLINDER: -2.00
OS_OVR_VA: 20/

## 2021-06-15 ASSESSMENT — AXIALLENGTH_DERIVED
OS_AL: 70.3729
OS_AL: 67.47
OD_AL: 75.8723
OD_AL: 76.38

## 2021-06-15 ASSESSMENT — SPHEQUIV_DERIVED
OS_SPHEQUIV: 1.75
OS_SPHEQUIV: 0.875
OD_SPHEQUIV: -0.25
OD_SPHEQUIV: -0.125

## 2021-06-15 ASSESSMENT — VISUAL ACUITY
OS_BCVA: 20/30-2
OD_BCVA: 20/30-1

## 2021-06-15 ASSESSMENT — KERATOMETRY
OD_K2POWER_DIOPTERS: -2.00
OS_K1POWER_DIOPTERS: +1.00
OS_K2POWER_DIOPTERS: -1.25
OS_AXISANGLE_DEGREES: 064
OD_AXISANGLE_DEGREES: 118
OD_K1POWER_DIOPTERS: +0.75

## 2021-06-15 ASSESSMENT — REFRACTION_AUTOREFRACTION
OD_SPHERE: +0.75
OD_AXIS: 098
OS_AXIS: 089
OS_CYLINDER: -1.50
OS_SPHERE: +2.50
OD_CYLINDER: -2.00

## 2023-02-15 ENCOUNTER — RX ONLY (RX ONLY)
Age: 83
End: 2023-02-15

## 2023-02-15 ENCOUNTER — DOCTOR'S OFFICE (OUTPATIENT)
Dept: URBAN - NONMETROPOLITAN AREA CLINIC 1 | Facility: CLINIC | Age: 83
Setting detail: OPHTHALMOLOGY
End: 2023-02-15
Payer: COMMERCIAL

## 2023-02-15 VITALS — HEIGHT: 55 IN

## 2023-02-15 DIAGNOSIS — H35.3131: ICD-10-CM

## 2023-02-15 DIAGNOSIS — H04.122: ICD-10-CM

## 2023-02-15 DIAGNOSIS — H47.012: ICD-10-CM

## 2023-02-15 DIAGNOSIS — E11.3292: ICD-10-CM

## 2023-02-15 DIAGNOSIS — H40.1121: ICD-10-CM

## 2023-02-15 DIAGNOSIS — E11.3311: ICD-10-CM

## 2023-02-15 DIAGNOSIS — H04.121: ICD-10-CM

## 2023-02-15 DIAGNOSIS — H40.1112: ICD-10-CM

## 2023-02-15 PROCEDURE — 92014 COMPRE OPH EXAM EST PT 1/>: CPT | Performed by: OPHTHALMOLOGY

## 2023-02-15 PROCEDURE — 92134 CPTRZ OPH DX IMG PST SGM RTA: CPT | Performed by: OPHTHALMOLOGY

## 2023-02-15 PROCEDURE — 92083 EXTENDED VISUAL FIELD XM: CPT | Performed by: OPHTHALMOLOGY

## 2023-02-15 ASSESSMENT — PUNCTA - ASSESSMENT
OS_PUNCTA: SIL PLUG
OD_PUNCTA: SIL PLUG

## 2023-02-15 ASSESSMENT — PACHYMETRY
OD_CT_CORRECTION: -2
OS_CT_UM: 567
OS_CT_CORRECTION: -1
OD_CT_UM: 575

## 2023-02-15 ASSESSMENT — AXIALLENGTH_DERIVED
OS_AL: 66.68
OD_AL: 80.13
OS_AL: 70.3729
OD_AL: 75.8723

## 2023-02-15 ASSESSMENT — REFRACTION_AUTOREFRACTION
OS_AXIS: 83
OD_SPHERE: -0.25
OD_CYLINDER: -1.75
OD_AXIS: 90
OS_SPHERE: +3.25
OS_CYLINDER: -2.50

## 2023-02-15 ASSESSMENT — REFRACTION_MANIFEST
OD_CYLINDER: -1.75
OS_CYLINDER: -1.25
OD_ADD: +2.75
OD_VA1: 20/25-2
OS_AXIS: 65
OS_SPHERE: +1.50
OS_VA1: 20/25-2
OD_VA2: 20/25-2
OS_VA2: 20/25-2
OD_AXIS: 105
OS_ADD: +2.75
OD_SPHERE: +0.75

## 2023-02-15 ASSESSMENT — REFRACTION_CURRENTRX
OD_SPHERE: +0.25
OS_ADD: +2.50
OD_ADD: +2.50
OS_OVR_VA: 20/
OD_AXIS: 102
OS_VPRISM_DIRECTION: PROGS
OD_OVR_VA: 20/
OS_AXIS: 067
OS_CYLINDER: -1.00
OD_VPRISM_DIRECTION: PROGS
OD_CYLINDER: -2.00
OS_SPHERE: +1.25

## 2023-02-15 ASSESSMENT — VISUAL ACUITY
OD_BCVA: 20/50
OS_BCVA: 20/70

## 2023-02-15 ASSESSMENT — KERATOMETRY
OS_K1POWER_DIOPTERS: +1.00
OS_AXISANGLE_DEGREES: 064
OD_AXISANGLE_DEGREES: 118
OD_K1POWER_DIOPTERS: +0.75
OS_K2POWER_DIOPTERS: -1.25
OD_K2POWER_DIOPTERS: -2.00

## 2023-02-15 ASSESSMENT — LID POSITION - DERMATOCHALASIS
OD_DERMATOCHALASIS: RUL 2+
OS_DERMATOCHALASIS: LUL 2+

## 2023-02-15 ASSESSMENT — LID POSITION - COMMENTS
OS_COMMENTS: MRD1: 2/2
OD_COMMENTS: MRD1: 2/2

## 2023-02-15 ASSESSMENT — TONOMETRY
OD_IOP_MMHG: 14
OS_IOP_MMHG: 14

## 2023-02-15 ASSESSMENT — DRY EYES - PHYSICIAN NOTES
OS_GENERALCOMMENTS: KSICCA
OD_GENERALCOMMENTS: KSICCA

## 2023-02-15 ASSESSMENT — CONFRONTATIONAL VISUAL FIELD TEST (CVF)
OD_FINDINGS: CONSTRICTION
OS_FINDINGS: FULL

## 2023-02-15 ASSESSMENT — SPHEQUIV_DERIVED
OS_SPHEQUIV: 0.875
OD_SPHEQUIV: -0.125
OS_SPHEQUIV: 2
OD_SPHEQUIV: -1.125

## 2023-02-15 ASSESSMENT — LID POSITION - ECTROPION
OD_ECTROPION: RLL
OS_ECTROPION: LLL

## 2023-02-15 ASSESSMENT — LACRIMAL DUCT - ASSESSMENT
OS_LACRIMAL_DUCT: LLL
OD_LACRIMAL_DUCT: RLL

## 2023-02-15 ASSESSMENT — SUPERFICIAL PUNCTATE KERATITIS (SPK)
OS_SPK: 1+ 2+
OD_SPK: 1+ 2+

## 2023-02-15 ASSESSMENT — LID POSITION - PTOSIS
OD_PTOSIS: 1+
OS_PTOSIS: 1+

## 2023-02-20 ENCOUNTER — DOCTOR'S OFFICE (OUTPATIENT)
Dept: URBAN - NONMETROPOLITAN AREA CLINIC 1 | Facility: CLINIC | Age: 83
Setting detail: OPHTHALMOLOGY
End: 2023-02-20
Payer: COMMERCIAL

## 2023-02-20 DIAGNOSIS — E11.3311: ICD-10-CM

## 2023-02-20 DIAGNOSIS — H35.373: ICD-10-CM

## 2023-02-20 DIAGNOSIS — H40.1121: ICD-10-CM

## 2023-02-20 DIAGNOSIS — E11.3292: ICD-10-CM

## 2023-02-20 DIAGNOSIS — H40.1112: ICD-10-CM

## 2023-02-20 DIAGNOSIS — H35.3131: ICD-10-CM

## 2023-02-20 DIAGNOSIS — H47.012: ICD-10-CM

## 2023-02-20 PROCEDURE — 92235 FLUORESCEIN ANGRPH MLTIFRAME: CPT | Performed by: OPHTHALMOLOGY

## 2023-02-20 PROCEDURE — 99213 OFFICE O/P EST LOW 20 MIN: CPT | Performed by: OPHTHALMOLOGY

## 2023-02-20 PROCEDURE — 92250 FUNDUS PHOTOGRAPHY W/I&R: CPT | Performed by: OPHTHALMOLOGY

## 2023-02-20 ASSESSMENT — PUNCTA - ASSESSMENT
OD_PUNCTA: SIL PLUG
OS_PUNCTA: SIL PLUG

## 2023-02-20 ASSESSMENT — DRY EYES - PHYSICIAN NOTES
OD_GENERALCOMMENTS: KSICCA
OS_GENERALCOMMENTS: KSICCA

## 2023-02-20 ASSESSMENT — LID POSITION - COMMENTS
OS_COMMENTS: MRD1: 2/2
OD_COMMENTS: MRD1: 2/2

## 2023-02-20 ASSESSMENT — LID POSITION - DERMATOCHALASIS
OD_DERMATOCHALASIS: RUL 2+
OS_DERMATOCHALASIS: LUL 2+

## 2023-02-20 ASSESSMENT — REFRACTION_MANIFEST
OD_CYLINDER: -1.75
OS_VA2: 20/25-2
OS_ADD: +2.75
OD_SPHERE: +0.75
OS_CYLINDER: -1.25
OS_SPHERE: +1.50
OD_ADD: +2.75
OD_AXIS: 105
OD_VA1: 20/25-2
OS_VA1: 20/25-2
OD_VA2: 20/25-2
OS_AXIS: 65

## 2023-02-20 ASSESSMENT — SPHEQUIV_DERIVED
OD_SPHEQUIV: -1.125
OS_SPHEQUIV: 0.875
OD_SPHEQUIV: -0.125
OS_SPHEQUIV: 2

## 2023-02-20 ASSESSMENT — KERATOMETRY
OD_AXISANGLE_DEGREES: 118
OS_AXISANGLE_DEGREES: 064
OD_K2POWER_DIOPTERS: -2.00
OS_K2POWER_DIOPTERS: -1.25
OD_K1POWER_DIOPTERS: +0.75
OS_K1POWER_DIOPTERS: +1.00

## 2023-02-20 ASSESSMENT — CONFRONTATIONAL VISUAL FIELD TEST (CVF)
OD_FINDINGS: CONSTRICTION
OS_FINDINGS: FULL

## 2023-02-20 ASSESSMENT — AXIALLENGTH_DERIVED
OS_AL: 66.68
OD_AL: 75.8723
OS_AL: 70.3729
OD_AL: 80.13

## 2023-02-20 ASSESSMENT — REFRACTION_CURRENTRX
OD_VPRISM_DIRECTION: PROGS
OD_SPHERE: +0.25
OS_VPRISM_DIRECTION: PROGS
OS_ADD: +2.50
OS_SPHERE: +1.25
OS_OVR_VA: 20/
OD_ADD: +2.50
OD_AXIS: 102
OS_AXIS: 067
OS_CYLINDER: -1.00
OD_OVR_VA: 20/
OD_CYLINDER: -2.00

## 2023-02-20 ASSESSMENT — REFRACTION_AUTOREFRACTION
OS_AXIS: 83
OD_CYLINDER: -1.75
OS_SPHERE: +3.25
OD_SPHERE: -0.25
OD_AXIS: 90
OS_CYLINDER: -2.50

## 2023-02-20 ASSESSMENT — SUPERFICIAL PUNCTATE KERATITIS (SPK)
OD_SPK: 1+ 2+
OS_SPK: 1+ 2+

## 2023-02-20 ASSESSMENT — VISUAL ACUITY
OD_BCVA: 20/50-2
OS_BCVA: 20/70

## 2023-02-20 ASSESSMENT — LID POSITION - ECTROPION
OS_ECTROPION: LLL
OD_ECTROPION: RLL

## 2023-02-20 ASSESSMENT — LACRIMAL DUCT - ASSESSMENT
OD_LACRIMAL_DUCT: RLL
OS_LACRIMAL_DUCT: LLL

## 2023-02-20 ASSESSMENT — LID POSITION - PTOSIS
OS_PTOSIS: 1+
OD_PTOSIS: 1+

## 2023-03-20 ENCOUNTER — DOCTOR'S OFFICE (OUTPATIENT)
Dept: URBAN - NONMETROPOLITAN AREA CLINIC 1 | Facility: CLINIC | Age: 83
Setting detail: OPHTHALMOLOGY
End: 2023-03-20
Payer: COMMERCIAL

## 2023-03-20 DIAGNOSIS — E11.3292: ICD-10-CM

## 2023-03-20 DIAGNOSIS — H35.373: ICD-10-CM

## 2023-03-20 DIAGNOSIS — E11.3311: ICD-10-CM

## 2023-03-20 DIAGNOSIS — H35.3131: ICD-10-CM

## 2023-03-20 PROCEDURE — 92134 CPTRZ OPH DX IMG PST SGM RTA: CPT | Performed by: OPHTHALMOLOGY

## 2023-03-20 PROCEDURE — 99213 OFFICE O/P EST LOW 20 MIN: CPT | Performed by: OPHTHALMOLOGY

## 2023-03-20 ASSESSMENT — LID POSITION - COMMENTS
OS_COMMENTS: MRD1: 2/2
OD_COMMENTS: MRD1: 2/2

## 2023-03-20 ASSESSMENT — REFRACTION_CURRENTRX
OD_CYLINDER: -2.00
OS_VPRISM_DIRECTION: PROGS
OS_AXIS: 067
OS_CYLINDER: -1.00
OS_ADD: +2.50
OD_ADD: +2.50
OD_SPHERE: +0.25
OS_SPHERE: +1.25
OD_OVR_VA: 20/
OD_VPRISM_DIRECTION: PROGS
OD_AXIS: 102
OS_OVR_VA: 20/

## 2023-03-20 ASSESSMENT — KERATOMETRY
OD_AXISANGLE_DEGREES: 118
OS_K1POWER_DIOPTERS: +1.00
OD_K2POWER_DIOPTERS: -2.00
OD_K1POWER_DIOPTERS: +0.75
OS_AXISANGLE_DEGREES: 064
OS_K2POWER_DIOPTERS: -1.25

## 2023-03-20 ASSESSMENT — REFRACTION_MANIFEST
OS_VA2: 20/25-2
OD_CYLINDER: -1.75
OS_ADD: +2.75
OS_CYLINDER: -1.25
OD_ADD: +2.75
OD_AXIS: 105
OD_VA2: 20/25-2
OS_AXIS: 65
OD_SPHERE: +0.75
OS_SPHERE: +1.50
OS_VA1: 20/25-2
OD_VA1: 20/25-2

## 2023-03-20 ASSESSMENT — SPHEQUIV_DERIVED
OD_SPHEQUIV: -0.125
OS_SPHEQUIV: 2
OD_SPHEQUIV: -1.125
OS_SPHEQUIV: 0.875

## 2023-03-20 ASSESSMENT — LACRIMAL DUCT - ASSESSMENT
OS_LACRIMAL_DUCT: LLL
OD_LACRIMAL_DUCT: RLL

## 2023-03-20 ASSESSMENT — PUNCTA - ASSESSMENT
OS_PUNCTA: SIL PLUG
OD_PUNCTA: SIL PLUG

## 2023-03-20 ASSESSMENT — LID POSITION - DERMATOCHALASIS
OS_DERMATOCHALASIS: LUL 2+
OD_DERMATOCHALASIS: RUL 2+

## 2023-03-20 ASSESSMENT — REFRACTION_AUTOREFRACTION
OD_AXIS: 90
OS_AXIS: 83
OD_CYLINDER: -1.75
OS_SPHERE: +3.25
OS_CYLINDER: -2.50
OD_SPHERE: -0.25

## 2023-03-20 ASSESSMENT — SUPERFICIAL PUNCTATE KERATITIS (SPK)
OD_SPK: 1+ 2+
OS_SPK: 1+ 2+

## 2023-03-20 ASSESSMENT — VISUAL ACUITY
OS_BCVA: 20/150+1
OD_BCVA: 20/50-2

## 2023-03-20 ASSESSMENT — DRY EYES - PHYSICIAN NOTES
OD_GENERALCOMMENTS: KSICCA
OS_GENERALCOMMENTS: KSICCA

## 2023-03-20 ASSESSMENT — LID POSITION - ECTROPION
OD_ECTROPION: RLL
OS_ECTROPION: LLL

## 2023-03-20 ASSESSMENT — LID POSITION - PTOSIS
OD_PTOSIS: 1+
OS_PTOSIS: 1+

## 2023-03-20 ASSESSMENT — AXIALLENGTH_DERIVED
OD_AL: 75.8723
OS_AL: 66.68
OD_AL: 80.13
OS_AL: 70.3729

## 2023-03-20 ASSESSMENT — CONFRONTATIONAL VISUAL FIELD TEST (CVF): OS_FINDINGS: FULL

## 2023-03-31 ENCOUNTER — DOCTOR'S OFFICE (OUTPATIENT)
Dept: URBAN - NONMETROPOLITAN AREA CLINIC 1 | Facility: CLINIC | Age: 83
Setting detail: OPHTHALMOLOGY
End: 2023-03-31
Payer: COMMERCIAL

## 2023-03-31 DIAGNOSIS — H35.373: ICD-10-CM

## 2023-03-31 DIAGNOSIS — H40.1112: ICD-10-CM

## 2023-03-31 DIAGNOSIS — H40.1121: ICD-10-CM

## 2023-03-31 DIAGNOSIS — E11.3311: ICD-10-CM

## 2023-03-31 PROCEDURE — 92133 CPTRZD OPH DX IMG PST SGM ON: CPT | Performed by: OPHTHALMOLOGY

## 2023-03-31 PROCEDURE — 99213 OFFICE O/P EST LOW 20 MIN: CPT | Performed by: OPHTHALMOLOGY

## 2023-03-31 ASSESSMENT — REFRACTION_MANIFEST
OS_CYLINDER: -1.25
OD_CYLINDER: -1.75
OS_AXIS: 65
OD_VA2: 20/25-2
OS_SPHERE: +1.50
OD_SPHERE: +0.75
OS_ADD: +2.75
OS_VA2: 20/25-2
OD_VA1: 20/25-2
OD_ADD: +2.75
OS_VA1: 20/25-2
OD_AXIS: 105

## 2023-03-31 ASSESSMENT — PUNCTA - ASSESSMENT
OD_PUNCTA: SIL PLUG
OS_PUNCTA: SIL PLUG

## 2023-03-31 ASSESSMENT — REFRACTION_AUTOREFRACTION
OS_AXIS: 83
OS_CYLINDER: -2.50
OS_SPHERE: +3.25
OD_SPHERE: -0.25
OD_AXIS: 90
OD_CYLINDER: -1.75

## 2023-03-31 ASSESSMENT — DRY EYES - PHYSICIAN NOTES
OD_GENERALCOMMENTS: KSICCA
OS_GENERALCOMMENTS: KSICCA

## 2023-03-31 ASSESSMENT — SUPERFICIAL PUNCTATE KERATITIS (SPK)
OD_SPK: 1+ 2+
OS_SPK: 1+ 2+

## 2023-03-31 ASSESSMENT — LID POSITION - PTOSIS
OS_PTOSIS: 1+
OD_PTOSIS: 1+

## 2023-03-31 ASSESSMENT — SPHEQUIV_DERIVED
OD_SPHEQUIV: -1.125
OS_SPHEQUIV: 2
OS_SPHEQUIV: 0.875
OD_SPHEQUIV: -0.125

## 2023-03-31 ASSESSMENT — REFRACTION_CURRENTRX
OS_OVR_VA: 20/
OS_VPRISM_DIRECTION: PROGS
OS_SPHERE: +1.25
OS_AXIS: 067
OD_OVR_VA: 20/
OD_AXIS: 102
OD_CYLINDER: -2.00
OD_ADD: +2.50
OD_VPRISM_DIRECTION: PROGS
OS_CYLINDER: -1.00
OD_SPHERE: +0.25
OS_ADD: +2.50

## 2023-03-31 ASSESSMENT — CONFRONTATIONAL VISUAL FIELD TEST (CVF): OS_FINDINGS: FULL

## 2023-03-31 ASSESSMENT — LID POSITION - DERMATOCHALASIS
OD_DERMATOCHALASIS: RUL 2+
OS_DERMATOCHALASIS: LUL 2+

## 2023-03-31 ASSESSMENT — LID POSITION - COMMENTS
OD_COMMENTS: MRD1: 2/2
OS_COMMENTS: MRD1: 2/2

## 2023-03-31 ASSESSMENT — VISUAL ACUITY
OD_BCVA: 20/30-
OS_BCVA: 20/60-2

## 2023-03-31 ASSESSMENT — LACRIMAL DUCT - ASSESSMENT
OD_LACRIMAL_DUCT: RLL
OS_LACRIMAL_DUCT: LLL

## 2023-03-31 ASSESSMENT — LID POSITION - ECTROPION
OD_ECTROPION: RLL
OS_ECTROPION: LLL

## 2023-04-27 ENCOUNTER — DOCTOR'S OFFICE (OUTPATIENT)
Dept: URBAN - NONMETROPOLITAN AREA CLINIC 1 | Facility: CLINIC | Age: 83
Setting detail: OPHTHALMOLOGY
End: 2023-04-27
Payer: COMMERCIAL

## 2023-04-27 DIAGNOSIS — E11.3292: ICD-10-CM

## 2023-04-27 DIAGNOSIS — H35.3131: ICD-10-CM

## 2023-04-27 DIAGNOSIS — E11.3311: ICD-10-CM

## 2023-04-27 DIAGNOSIS — H35.373: ICD-10-CM

## 2023-04-27 PROCEDURE — 92134 CPTRZ OPH DX IMG PST SGM RTA: CPT | Performed by: OPHTHALMOLOGY

## 2023-04-27 PROCEDURE — 99213 OFFICE O/P EST LOW 20 MIN: CPT | Performed by: OPHTHALMOLOGY

## 2023-04-27 ASSESSMENT — SUPERFICIAL PUNCTATE KERATITIS (SPK)
OS_SPK: 1+ 2+
OD_SPK: 1+ 2+

## 2023-04-27 ASSESSMENT — LID POSITION - DERMATOCHALASIS
OS_DERMATOCHALASIS: LUL 2+
OD_DERMATOCHALASIS: RUL 2+

## 2023-04-27 ASSESSMENT — DRY EYES - PHYSICIAN NOTES
OS_GENERALCOMMENTS: KSICCA
OD_GENERALCOMMENTS: KSICCA

## 2023-04-27 ASSESSMENT — PUNCTA - ASSESSMENT
OS_PUNCTA: SIL PLUG
OD_PUNCTA: SIL PLUG

## 2023-04-27 ASSESSMENT — CONFRONTATIONAL VISUAL FIELD TEST (CVF)
OD_FINDINGS: FULL
OS_FINDINGS: FULL

## 2023-04-27 ASSESSMENT — LACRIMAL DUCT - ASSESSMENT
OS_LACRIMAL_DUCT: LLL
OD_LACRIMAL_DUCT: RLL

## 2023-04-27 ASSESSMENT — LID POSITION - COMMENTS
OS_COMMENTS: MRD1: 2/2
OD_COMMENTS: MRD1: 2/2

## 2023-04-27 ASSESSMENT — LID POSITION - ECTROPION
OS_ECTROPION: LLL
OD_ECTROPION: RLL

## 2023-04-27 ASSESSMENT — LID POSITION - PTOSIS
OD_PTOSIS: 1+
OS_PTOSIS: 1+

## 2023-04-28 ASSESSMENT — REFRACTION_CURRENTRX
OD_ADD: +2.50
OS_OVR_VA: 20/
OS_CYLINDER: -1.00
OS_AXIS: 067
OD_OVR_VA: 20/
OS_SPHERE: +1.25
OS_ADD: +2.50
OD_AXIS: 102
OS_VPRISM_DIRECTION: PROGS
OD_VPRISM_DIRECTION: PROGS
OD_SPHERE: +0.25
OD_CYLINDER: -2.00

## 2023-04-28 ASSESSMENT — AXIALLENGTH_DERIVED
OS_AL: 70.3729
OD_AL: 80.13
OD_AL: 75.8723
OS_AL: 66.68

## 2023-04-28 ASSESSMENT — KERATOMETRY
OS_AXISANGLE_DEGREES: 064
OS_K1POWER_DIOPTERS: +1.00
OD_K1POWER_DIOPTERS: +0.75
OS_K2POWER_DIOPTERS: -1.25
OD_K2POWER_DIOPTERS: -2.00
OD_AXISANGLE_DEGREES: 118

## 2023-04-28 ASSESSMENT — REFRACTION_MANIFEST
OS_CYLINDER: -1.25
OD_AXIS: 105
OD_CYLINDER: -1.75
OS_SPHERE: +1.50
OD_SPHERE: +0.75
OS_AXIS: 65
OD_VA1: 20/25-2
OS_VA2: 20/25-2
OS_ADD: +2.75
OD_ADD: +2.75
OS_VA1: 20/25-2
OD_VA2: 20/25-2

## 2023-04-28 ASSESSMENT — REFRACTION_AUTOREFRACTION
OS_CYLINDER: -2.50
OD_AXIS: 90
OS_AXIS: 83
OD_CYLINDER: -1.75
OS_SPHERE: +3.25
OD_SPHERE: -0.25

## 2023-04-28 ASSESSMENT — SPHEQUIV_DERIVED
OD_SPHEQUIV: -1.125
OS_SPHEQUIV: 2
OD_SPHEQUIV: -0.125
OS_SPHEQUIV: 0.875

## 2023-04-28 ASSESSMENT — VISUAL ACUITY
OS_BCVA: 20/60
OD_BCVA: 20/40

## 2023-05-17 ENCOUNTER — DOCTOR'S OFFICE (OUTPATIENT)
Dept: URBAN - NONMETROPOLITAN AREA CLINIC 1 | Facility: CLINIC | Age: 83
Setting detail: OPHTHALMOLOGY
End: 2023-05-17
Payer: COMMERCIAL

## 2023-05-17 DIAGNOSIS — E11.3311: ICD-10-CM

## 2023-05-17 DIAGNOSIS — E11.3292: ICD-10-CM

## 2023-05-17 DIAGNOSIS — H40.1112: ICD-10-CM

## 2023-05-17 DIAGNOSIS — Z96.1: ICD-10-CM

## 2023-05-17 DIAGNOSIS — H40.1121: ICD-10-CM

## 2023-05-17 DIAGNOSIS — H35.373: ICD-10-CM

## 2023-05-17 PROCEDURE — 99214 OFFICE O/P EST MOD 30 MIN: CPT | Performed by: OPHTHALMOLOGY

## 2023-05-17 PROCEDURE — 92133 CPTRZD OPH DX IMG PST SGM ON: CPT | Performed by: OPHTHALMOLOGY

## 2023-05-17 ASSESSMENT — KERATOMETRY
OD_AXISANGLE_DEGREES: 118
OS_K1POWER_DIOPTERS: +1.00
OS_AXISANGLE_DEGREES: 064
OD_K1POWER_DIOPTERS: +0.75
OS_K2POWER_DIOPTERS: -1.25
OD_K2POWER_DIOPTERS: -2.00

## 2023-05-17 ASSESSMENT — PUNCTA - ASSESSMENT
OD_PUNCTA: SIL PLUG
OS_PUNCTA: SIL PLUG

## 2023-05-17 ASSESSMENT — REFRACTION_MANIFEST
OS_SPHERE: +1.50
OS_AXIS: 65
OS_ADD: +2.75
OD_AXIS: 105
OD_ADD: +2.75
OD_VA2: 20/25-2
OD_CYLINDER: -1.75
OS_VA1: 20/25-2
OS_CYLINDER: -1.25
OD_SPHERE: +0.75
OS_VA2: 20/25-2
OD_VA1: 20/25-2

## 2023-05-17 ASSESSMENT — REFRACTION_CURRENTRX
OD_OVR_VA: 20/
OD_AXIS: 102
OD_SPHERE: +0.25
OS_AXIS: 067
OD_VPRISM_DIRECTION: PROGS
OS_OVR_VA: 20/
OS_SPHERE: +1.25
OD_CYLINDER: -2.00
OD_ADD: +2.50
OS_VPRISM_DIRECTION: PROGS
OS_CYLINDER: -1.00
OS_ADD: +2.50

## 2023-05-17 ASSESSMENT — LID POSITION - ECTROPION
OS_ECTROPION: LLL
OD_ECTROPION: RLL

## 2023-05-17 ASSESSMENT — PACHYMETRY
OD_CT_UM: 575
OD_CT_CORRECTION: -2
OS_CT_CORRECTION: -1
OS_CT_UM: 567

## 2023-05-17 ASSESSMENT — VISUAL ACUITY
OD_BCVA: 20/30+1
OS_BCVA: 20/30

## 2023-05-17 ASSESSMENT — CONFRONTATIONAL VISUAL FIELD TEST (CVF)
OD_FINDINGS: FULL
OS_FINDINGS: FULL

## 2023-05-17 ASSESSMENT — AXIALLENGTH_DERIVED
OS_AL: 70.3729
OD_AL: 78.4773
OS_AL: 63
OD_AL: 75.8723

## 2023-05-17 ASSESSMENT — DRY EYES - PHYSICIAN NOTES
OD_GENERALCOMMENTS: KSICCA
OS_GENERALCOMMENTS: KSICCA

## 2023-05-17 ASSESSMENT — LID POSITION - PTOSIS
OS_PTOSIS: 1+
OD_PTOSIS: 1+

## 2023-05-17 ASSESSMENT — REFRACTION_AUTOREFRACTION
OS_CYLINDER: -3.00
OS_SPHERE: +4.75
OD_CYLINDER: -1.50
OS_AXIS: 080
OD_SPHERE: 0.00
OD_AXIS: 092

## 2023-05-17 ASSESSMENT — SPHEQUIV_DERIVED
OS_SPHEQUIV: 3.25
OD_SPHEQUIV: -0.75
OS_SPHEQUIV: 0.875
OD_SPHEQUIV: -0.125

## 2023-05-17 ASSESSMENT — LACRIMAL DUCT - ASSESSMENT
OD_LACRIMAL_DUCT: RLL
OS_LACRIMAL_DUCT: LLL

## 2023-05-17 ASSESSMENT — SUPERFICIAL PUNCTATE KERATITIS (SPK)
OD_SPK: 1+ 2+
OS_SPK: 1+ 2+

## 2023-05-17 ASSESSMENT — LID POSITION - DERMATOCHALASIS
OD_DERMATOCHALASIS: RUL 2+
OS_DERMATOCHALASIS: LUL 2+

## 2023-05-17 ASSESSMENT — LID POSITION - COMMENTS
OS_COMMENTS: MRD1: 2/2
OD_COMMENTS: MRD1: 2/2

## 2023-05-25 ENCOUNTER — DOCTOR'S OFFICE (OUTPATIENT)
Dept: URBAN - NONMETROPOLITAN AREA CLINIC 1 | Facility: CLINIC | Age: 83
Setting detail: OPHTHALMOLOGY
End: 2023-05-25
Payer: COMMERCIAL

## 2023-05-25 DIAGNOSIS — H40.1121: ICD-10-CM

## 2023-05-25 DIAGNOSIS — H35.373: ICD-10-CM

## 2023-05-25 DIAGNOSIS — H40.1112: ICD-10-CM

## 2023-05-25 DIAGNOSIS — E11.3311: ICD-10-CM

## 2023-05-25 DIAGNOSIS — E11.3292: ICD-10-CM

## 2023-05-25 PROCEDURE — 99214 OFFICE O/P EST MOD 30 MIN: CPT | Performed by: OPHTHALMOLOGY

## 2023-05-25 PROCEDURE — 92134 CPTRZ OPH DX IMG PST SGM RTA: CPT | Performed by: OPHTHALMOLOGY

## 2023-05-25 ASSESSMENT — REFRACTION_MANIFEST
OS_VA2: 20/25-2
OD_VA1: 20/25-2
OD_ADD: +2.75
OD_CYLINDER: -1.75
OS_ADD: +2.75
OS_CYLINDER: -1.25
OS_VA1: 20/25-2
OD_AXIS: 105
OD_SPHERE: +0.75
OD_VA2: 20/25-2
OS_AXIS: 65
OS_SPHERE: +1.50

## 2023-05-25 ASSESSMENT — REFRACTION_CURRENTRX
OS_CYLINDER: -1.00
OS_OVR_VA: 20/
OD_CYLINDER: -2.00
OD_OVR_VA: 20/
OD_AXIS: 102
OS_ADD: +2.50
OS_AXIS: 067
OD_VPRISM_DIRECTION: PROGS
OD_ADD: +2.50
OS_SPHERE: +1.25
OD_SPHERE: +0.25
OS_VPRISM_DIRECTION: PROGS

## 2023-05-25 ASSESSMENT — PUNCTA - ASSESSMENT
OS_PUNCTA: SIL PLUG
OD_PUNCTA: SIL PLUG

## 2023-05-25 ASSESSMENT — LID POSITION - ECTROPION
OD_ECTROPION: RLL
OS_ECTROPION: LLL

## 2023-05-25 ASSESSMENT — REFRACTION_AUTOREFRACTION
OS_CYLINDER: -3.00
OS_AXIS: 080
OD_CYLINDER: -1.50
OD_AXIS: 092
OS_SPHERE: +4.75
OD_SPHERE: 0.00

## 2023-05-25 ASSESSMENT — SPHEQUIV_DERIVED
OS_SPHEQUIV: 3.25
OS_SPHEQUIV: 0.875
OD_SPHEQUIV: -0.125
OD_SPHEQUIV: -0.75

## 2023-05-25 ASSESSMENT — LID POSITION - COMMENTS
OS_COMMENTS: MRD1: 2/2
OD_COMMENTS: MRD1: 2/2

## 2023-05-25 ASSESSMENT — SUPERFICIAL PUNCTATE KERATITIS (SPK)
OD_SPK: 1+ 2+
OS_SPK: 1+ 2+

## 2023-05-25 ASSESSMENT — KERATOMETRY
OD_K1POWER_DIOPTERS: +0.75
OD_AXISANGLE_DEGREES: 118
OS_K1POWER_DIOPTERS: +1.00
OS_K2POWER_DIOPTERS: -1.25
OD_K2POWER_DIOPTERS: -2.00
OS_AXISANGLE_DEGREES: 064

## 2023-05-25 ASSESSMENT — LACRIMAL DUCT - ASSESSMENT
OS_LACRIMAL_DUCT: LLL
OD_LACRIMAL_DUCT: RLL

## 2023-05-25 ASSESSMENT — LID POSITION - DERMATOCHALASIS
OS_DERMATOCHALASIS: LUL 2+
OD_DERMATOCHALASIS: RUL 2+

## 2023-05-25 ASSESSMENT — AXIALLENGTH_DERIVED
OS_AL: 70.3729
OS_AL: 63
OD_AL: 75.8723
OD_AL: 78.4773

## 2023-05-25 ASSESSMENT — DRY EYES - PHYSICIAN NOTES
OD_GENERALCOMMENTS: KSICCA
OS_GENERALCOMMENTS: KSICCA

## 2023-05-25 ASSESSMENT — CONFRONTATIONAL VISUAL FIELD TEST (CVF)
OS_FINDINGS: FULL
OD_FINDINGS: FULL

## 2023-05-25 ASSESSMENT — LID POSITION - PTOSIS
OD_PTOSIS: 1+
OS_PTOSIS: 1+

## 2023-05-25 ASSESSMENT — VISUAL ACUITY
OD_BCVA: 20/30-1
OS_BCVA: 20/30-1

## 2023-05-31 ENCOUNTER — DOCTOR'S OFFICE (OUTPATIENT)
Dept: URBAN - NONMETROPOLITAN AREA CLINIC 1 | Facility: CLINIC | Age: 83
Setting detail: OPHTHALMOLOGY
End: 2023-05-31
Payer: COMMERCIAL

## 2023-05-31 DIAGNOSIS — H04.121: ICD-10-CM

## 2023-05-31 DIAGNOSIS — H40.1121: ICD-10-CM

## 2023-05-31 DIAGNOSIS — H40.1112: ICD-10-CM

## 2023-05-31 DIAGNOSIS — H04.122: ICD-10-CM

## 2023-05-31 PROCEDURE — 99212 OFFICE O/P EST SF 10 MIN: CPT | Performed by: OPHTHALMOLOGY

## 2023-05-31 ASSESSMENT — REFRACTION_MANIFEST
OS_CYLINDER: -1.25
OS_VA2: 20/25-2
OS_VA1: 20/25-2
OD_CYLINDER: -1.75
OD_VA2: 20/25-2
OS_AXIS: 65
OD_VA1: 20/25-2
OD_AXIS: 105
OS_SPHERE: +1.50
OD_ADD: +2.75
OS_ADD: +2.75
OD_SPHERE: +0.75

## 2023-05-31 ASSESSMENT — REFRACTION_CURRENTRX
OS_ADD: +2.50
OD_CYLINDER: -2.00
OD_OVR_VA: 20/
OD_AXIS: 102
OS_AXIS: 067
OD_VPRISM_DIRECTION: PROGS
OD_ADD: +2.50
OS_SPHERE: +1.25
OS_CYLINDER: -1.00
OD_SPHERE: +0.25
OS_VPRISM_DIRECTION: PROGS
OS_OVR_VA: 20/

## 2023-05-31 ASSESSMENT — PUNCTA - ASSESSMENT
OD_PUNCTA: SIL PLUG
OS_PUNCTA: SIL PLUG

## 2023-05-31 ASSESSMENT — SPHEQUIV_DERIVED
OS_SPHEQUIV: 1.25
OS_SPHEQUIV: 0.875
OD_SPHEQUIV: -0.125
OD_SPHEQUIV: -0.5

## 2023-05-31 ASSESSMENT — DRY EYES - PHYSICIAN NOTES
OS_GENERALCOMMENTS: KSICCA
OD_GENERALCOMMENTS: KSICCA

## 2023-05-31 ASSESSMENT — LID POSITION - PTOSIS
OD_PTOSIS: 1+
OS_PTOSIS: 1+

## 2023-05-31 ASSESSMENT — REFRACTION_AUTOREFRACTION
OD_AXIS: 109
OD_SPHERE: +0.25
OS_CYLINDER: -2.00
OD_CYLINDER: -1.50
OS_AXIS: 081
OS_SPHERE: +2.25

## 2023-05-31 ASSESSMENT — TONOMETRY
OS_IOP_MMHG: 12
OD_IOP_MMHG: 13

## 2023-05-31 ASSESSMENT — CONFRONTATIONAL VISUAL FIELD TEST (CVF)
OS_FINDINGS: FULL
OD_FINDINGS: FULL

## 2023-05-31 ASSESSMENT — PACHYMETRY
OD_CT_CORRECTION: -2
OS_CT_CORRECTION: -1
OD_CT_UM: 575
OS_CT_UM: 567

## 2023-05-31 ASSESSMENT — LACRIMAL DUCT - ASSESSMENT
OD_LACRIMAL_DUCT: RLL
OS_LACRIMAL_DUCT: LLL

## 2023-05-31 ASSESSMENT — LID POSITION - ECTROPION
OD_ECTROPION: RLL
OS_ECTROPION: LLL

## 2023-05-31 ASSESSMENT — LID POSITION - COMMENTS
OS_COMMENTS: MRD1: 2/2
OD_COMMENTS: MRD1: 2/2

## 2023-05-31 ASSESSMENT — VISUAL ACUITY
OS_BCVA: 20/25-1
OD_BCVA: 20/30-2

## 2023-05-31 ASSESSMENT — SUPERFICIAL PUNCTATE KERATITIS (SPK)
OD_SPK: 1+ 2+
OS_SPK: 1+ 2+

## 2023-05-31 ASSESSMENT — LID POSITION - DERMATOCHALASIS
OD_DERMATOCHALASIS: RUL 2+
OS_DERMATOCHALASIS: LUL 2+

## 2023-06-22 ENCOUNTER — DOCTOR'S OFFICE (OUTPATIENT)
Dept: URBAN - NONMETROPOLITAN AREA CLINIC 1 | Facility: CLINIC | Age: 83
Setting detail: OPHTHALMOLOGY
End: 2023-06-22
Payer: COMMERCIAL

## 2023-06-22 DIAGNOSIS — H35.3131: ICD-10-CM

## 2023-06-22 DIAGNOSIS — E11.3292: ICD-10-CM

## 2023-06-22 DIAGNOSIS — E11.3311: ICD-10-CM

## 2023-06-22 DIAGNOSIS — H43.813: ICD-10-CM

## 2023-06-22 DIAGNOSIS — H35.373: ICD-10-CM

## 2023-06-22 PROCEDURE — 92250 FUNDUS PHOTOGRAPHY W/I&R: CPT | Performed by: OPHTHALMOLOGY

## 2023-06-22 PROCEDURE — 92235 FLUORESCEIN ANGRPH MLTIFRAME: CPT | Performed by: OPHTHALMOLOGY

## 2023-06-22 PROCEDURE — 99213 OFFICE O/P EST LOW 20 MIN: CPT | Performed by: OPHTHALMOLOGY

## 2023-06-22 ASSESSMENT — KERATOMETRY
OD_K1POWER_DIOPTERS: +0.75
OS_K1POWER_DIOPTERS: +1.00
OS_K2POWER_DIOPTERS: -1.25
OD_K2POWER_DIOPTERS: -2.00
OS_AXISANGLE_DEGREES: 064
OD_AXISANGLE_DEGREES: 118

## 2023-06-22 ASSESSMENT — LID POSITION - DERMATOCHALASIS
OD_DERMATOCHALASIS: RUL 2+
OS_DERMATOCHALASIS: LUL 2+

## 2023-06-22 ASSESSMENT — CONFRONTATIONAL VISUAL FIELD TEST (CVF)
OS_FINDINGS: FULL
OD_FINDINGS: FULL

## 2023-06-22 ASSESSMENT — REFRACTION_CURRENTRX
OS_CYLINDER: -1.00
OS_VPRISM_DIRECTION: PROGS
OD_ADD: +2.50
OD_SPHERE: +0.25
OD_OVR_VA: 20/
OS_AXIS: 067
OD_CYLINDER: -2.00
OD_VPRISM_DIRECTION: PROGS
OS_SPHERE: +1.25
OS_OVR_VA: 20/
OD_AXIS: 102
OS_ADD: +2.50

## 2023-06-22 ASSESSMENT — SPHEQUIV_DERIVED
OS_SPHEQUIV: 0.875
OS_SPHEQUIV: 1.25
OD_SPHEQUIV: -0.125
OD_SPHEQUIV: -0.5

## 2023-06-22 ASSESSMENT — LACRIMAL DUCT - ASSESSMENT
OD_LACRIMAL_DUCT: RLL
OS_LACRIMAL_DUCT: LLL

## 2023-06-22 ASSESSMENT — REFRACTION_AUTOREFRACTION
OS_SPHERE: +2.25
OS_AXIS: 081
OD_SPHERE: +0.25
OD_CYLINDER: -1.50
OD_AXIS: 109
OS_CYLINDER: -2.00

## 2023-06-22 ASSESSMENT — LID POSITION - ECTROPION
OS_ECTROPION: LLL
OD_ECTROPION: RLL

## 2023-06-22 ASSESSMENT — REFRACTION_MANIFEST
OD_VA2: 20/25-2
OD_ADD: +2.75
OD_AXIS: 105
OD_VA1: 20/25-2
OD_CYLINDER: -1.75
OS_SPHERE: +1.50
OS_ADD: +2.75
OS_VA2: 20/25-2
OD_SPHERE: +0.75
OS_AXIS: 65
OS_VA1: 20/25-2
OS_CYLINDER: -1.25

## 2023-06-22 ASSESSMENT — LID POSITION - PTOSIS
OS_PTOSIS: 1+
OD_PTOSIS: 1+

## 2023-06-22 ASSESSMENT — SUPERFICIAL PUNCTATE KERATITIS (SPK)
OD_SPK: 1+ 2+
OS_SPK: 1+ 2+

## 2023-06-22 ASSESSMENT — AXIALLENGTH_DERIVED
OS_AL: 69.1
OS_AL: 70.3729
OD_AL: 77.4141
OD_AL: 75.8723

## 2023-06-22 ASSESSMENT — LID POSITION - COMMENTS
OS_COMMENTS: MRD1: 2/2
OD_COMMENTS: MRD1: 2/2

## 2023-06-22 ASSESSMENT — VISUAL ACUITY
OS_BCVA: 20/30-2
OD_BCVA: 20/30-2

## 2023-06-22 ASSESSMENT — DRY EYES - PHYSICIAN NOTES
OS_GENERALCOMMENTS: KSICCA
OD_GENERALCOMMENTS: KSICCA

## 2023-06-22 ASSESSMENT — PUNCTA - ASSESSMENT
OS_PUNCTA: SIL PLUG
OD_PUNCTA: SIL PLUG

## 2023-06-29 ENCOUNTER — APPOINTMENT (RX ONLY)
Dept: URBAN - NONMETROPOLITAN AREA CLINIC 4 | Facility: CLINIC | Age: 83
Setting detail: DERMATOLOGY
End: 2023-06-29

## 2023-06-29 DIAGNOSIS — L57.0 ACTINIC KERATOSIS: ICD-10-CM

## 2023-06-29 PROCEDURE — ? COUNSELING

## 2023-06-29 PROCEDURE — 17000 DESTRUCT PREMALG LESION: CPT

## 2023-06-29 PROCEDURE — 17003 DESTRUCT PREMALG LES 2-14: CPT

## 2023-06-29 PROCEDURE — ? TREATMENT REGIMEN

## 2023-06-29 PROCEDURE — ? LIQUID NITROGEN

## 2023-06-29 ASSESSMENT — LOCATION ZONE DERM
LOCATION ZONE: ARM
LOCATION ZONE: EAR

## 2023-06-29 ASSESSMENT — LOCATION DETAILED DESCRIPTION DERM
LOCATION DETAILED: LEFT INFERIOR HELIX
LOCATION DETAILED: LEFT POSTERIOR SHOULDER

## 2023-06-29 ASSESSMENT — LOCATION SIMPLE DESCRIPTION DERM
LOCATION SIMPLE: LEFT SHOULDER
LOCATION SIMPLE: LEFT EAR

## 2023-06-29 NOTE — PROCEDURE: LIQUID NITROGEN
Duration Of Freeze Thaw-Cycle (Seconds): 0
Post-Care Instructions: I reviewed with the patient in detail post-care instructions. Patient is to wear sunprotection, and avoid picking at any of the treated lesions. Pt may apply Vaseline to crusted or scabbing areas.
Render Note In Bullet Format When Appropriate: No
Number Of Freeze-Thaw Cycles: 1 freeze-thaw cycle
Detail Level: Zone
Consent: The patient's consent was obtained including but not limited to risks of crusting, scabbing, blistering, scarring, darker or lighter pigmentary change, recurrence, incomplete removal and infection.
Show Applicator Variable?: Yes

## 2023-10-25 ENCOUNTER — RX ONLY (RX ONLY)
Age: 83
End: 2023-10-25

## 2023-10-25 RX ORDER — BROMFENAC SODIUM 0.7 MG/ML
SOLUTION/ DROPS OPHTHALMIC
Qty: 3 | Refills: 3 | Status: ACTIVE | OUTPATIENT

## 2023-12-06 ENCOUNTER — DOCTOR'S OFFICE (OUTPATIENT)
Dept: URBAN - NONMETROPOLITAN AREA CLINIC 1 | Facility: CLINIC | Age: 83
Setting detail: OPHTHALMOLOGY
End: 2023-12-06
Payer: COMMERCIAL

## 2023-12-06 DIAGNOSIS — E11.3292: ICD-10-CM

## 2023-12-06 DIAGNOSIS — H04.121: ICD-10-CM

## 2023-12-06 DIAGNOSIS — H40.1121: ICD-10-CM

## 2023-12-06 DIAGNOSIS — H35.3131: ICD-10-CM

## 2023-12-06 DIAGNOSIS — H40.1112: ICD-10-CM

## 2023-12-06 DIAGNOSIS — H02.105: ICD-10-CM

## 2023-12-06 DIAGNOSIS — H47.012: ICD-10-CM

## 2023-12-06 DIAGNOSIS — E11.3311: ICD-10-CM

## 2023-12-06 DIAGNOSIS — H35.373: ICD-10-CM

## 2023-12-06 DIAGNOSIS — H02.102: ICD-10-CM

## 2023-12-06 DIAGNOSIS — H43.813: ICD-10-CM

## 2023-12-06 DIAGNOSIS — H04.122: ICD-10-CM

## 2023-12-06 PROCEDURE — 99214 OFFICE O/P EST MOD 30 MIN: CPT | Performed by: OPHTHALMOLOGY

## 2023-12-06 PROCEDURE — 92133 CPTRZD OPH DX IMG PST SGM ON: CPT | Performed by: OPHTHALMOLOGY

## 2023-12-06 ASSESSMENT — REFRACTION_MANIFEST
OS_ADD: +2.75
OD_VA1: 20/25-2
OS_VA2: 20/25-2
OD_VA2: 20/25-2
OS_CYLINDER: -1.25
OD_ADD: +2.75
OD_SPHERE: +0.75
OS_VA1: 20/25-2
OD_CYLINDER: -1.75
OD_AXIS: 105
OS_SPHERE: +1.50
OS_AXIS: 65

## 2023-12-06 ASSESSMENT — CONFRONTATIONAL VISUAL FIELD TEST (CVF)
OS_FINDINGS: FULL
OD_FINDINGS: FULL

## 2023-12-06 ASSESSMENT — SPHEQUIV_DERIVED
OS_SPHEQUIV: 0.875
OS_SPHEQUIV: 0.875
OD_SPHEQUIV: -0.5
OD_SPHEQUIV: -0.125

## 2023-12-06 ASSESSMENT — LACRIMAL DUCT - ASSESSMENT
OS_LACRIMAL_DUCT: LLL
OD_LACRIMAL_DUCT: RLL

## 2023-12-06 ASSESSMENT — LID POSITION - ECTROPION
OS_ECTROPION: LLL
OD_ECTROPION: RLL

## 2023-12-06 ASSESSMENT — REFRACTION_CURRENTRX
OS_OVR_VA: 20/
OS_VPRISM_DIRECTION: PROGS
OD_OVR_VA: 20/
OS_AXIS: 067
OS_ADD: +2.50
OD_VPRISM_DIRECTION: PROGS
OS_CYLINDER: -1.00
OD_CYLINDER: -2.00
OD_ADD: +2.50
OD_AXIS: 102
OD_SPHERE: +0.25
OS_SPHERE: +1.25

## 2023-12-06 ASSESSMENT — DRY EYES - PHYSICIAN NOTES
OS_GENERALCOMMENTS: KSICCA
OD_GENERALCOMMENTS: KSICCA

## 2023-12-06 ASSESSMENT — REFRACTION_AUTOREFRACTION
OD_AXIS: 093
OD_CYLINDER: -2.00
OS_SPHERE: +2.25
OD_SPHERE: +0.50
OS_CYLINDER: -2.75
OS_AXIS: 072

## 2023-12-06 ASSESSMENT — LID POSITION - PTOSIS
OD_PTOSIS: 1+
OS_PTOSIS: 1+

## 2023-12-06 ASSESSMENT — PUNCTA - ASSESSMENT
OD_PUNCTA: SIL PLUG
OS_PUNCTA: SIL PLUG

## 2023-12-06 ASSESSMENT — SUPERFICIAL PUNCTATE KERATITIS (SPK)
OD_SPK: 1+ 2+
OS_SPK: 1+ 2+

## 2023-12-06 ASSESSMENT — LID POSITION - COMMENTS
OD_COMMENTS: MRD1: 2/2
OS_COMMENTS: MRD1: 2/2

## 2023-12-06 ASSESSMENT — LID POSITION - DERMATOCHALASIS
OS_DERMATOCHALASIS: LUL 2+
OD_DERMATOCHALASIS: RUL 2+

## 2024-01-08 ENCOUNTER — DOCTOR'S OFFICE (OUTPATIENT)
Dept: URBAN - NONMETROPOLITAN AREA CLINIC 1 | Facility: CLINIC | Age: 84
Setting detail: OPHTHALMOLOGY
End: 2024-01-08
Payer: COMMERCIAL

## 2024-01-08 DIAGNOSIS — H35.3131: ICD-10-CM

## 2024-01-08 DIAGNOSIS — E11.3292: ICD-10-CM

## 2024-01-08 DIAGNOSIS — E11.3311: ICD-10-CM

## 2024-01-08 DIAGNOSIS — H35.373: ICD-10-CM

## 2024-01-08 PROCEDURE — 92202 OPSCPY EXTND ON/MAC DRAW: CPT | Performed by: OPHTHALMOLOGY

## 2024-01-08 PROCEDURE — 99214 OFFICE O/P EST MOD 30 MIN: CPT | Performed by: OPHTHALMOLOGY

## 2024-01-08 PROCEDURE — 92134 CPTRZ OPH DX IMG PST SGM RTA: CPT | Performed by: OPHTHALMOLOGY

## 2024-01-08 ASSESSMENT — SPHEQUIV_DERIVED
OS_SPHEQUIV: 0.875
OS_SPHEQUIV: 0.875
OD_SPHEQUIV: -0.5
OD_SPHEQUIV: -0.125

## 2024-01-08 ASSESSMENT — LID POSITION - DERMATOCHALASIS
OS_DERMATOCHALASIS: LUL 2+
OD_DERMATOCHALASIS: RUL 2+

## 2024-01-08 ASSESSMENT — REFRACTION_MANIFEST
OD_SPHERE: +0.75
OD_ADD: +2.75
OD_VA2: 20/25-2
OS_ADD: +2.75
OD_CYLINDER: -1.75
OD_AXIS: 105
OS_SPHERE: +1.50
OS_VA1: 20/25-2
OS_AXIS: 65
OS_CYLINDER: -1.25
OD_VA1: 20/25-2
OS_VA2: 20/25-2

## 2024-01-08 ASSESSMENT — LID POSITION - COMMENTS
OD_COMMENTS: MRD1: 2/2
OS_COMMENTS: MRD1: 2/2

## 2024-01-08 ASSESSMENT — REFRACTION_CURRENTRX
OD_SPHERE: +0.25
OS_AXIS: 067
OS_OVR_VA: 20/
OS_ADD: +2.50
OD_ADD: +2.50
OD_OVR_VA: 20/
OS_VPRISM_DIRECTION: PROGS
OS_SPHERE: +1.25
OD_AXIS: 102
OS_CYLINDER: -1.00
OD_CYLINDER: -2.00
OD_VPRISM_DIRECTION: PROGS

## 2024-01-08 ASSESSMENT — REFRACTION_AUTOREFRACTION
OS_SPHERE: +2.25
OD_CYLINDER: -2.00
OS_AXIS: 072
OS_CYLINDER: -2.75
OD_AXIS: 093
OD_SPHERE: +0.50

## 2024-01-08 ASSESSMENT — SUPERFICIAL PUNCTATE KERATITIS (SPK)
OS_SPK: 1+ 2+
OD_SPK: 1+ 2+

## 2024-01-08 ASSESSMENT — LACRIMAL DUCT - ASSESSMENT
OD_LACRIMAL_DUCT: RLL
OS_LACRIMAL_DUCT: LLL

## 2024-01-08 ASSESSMENT — PUNCTA - ASSESSMENT
OS_PUNCTA: SIL PLUG
OD_PUNCTA: SIL PLUG

## 2024-01-08 ASSESSMENT — DRY EYES - PHYSICIAN NOTES
OS_GENERALCOMMENTS: KSICCA
OD_GENERALCOMMENTS: KSICCA

## 2024-01-08 ASSESSMENT — LID POSITION - PTOSIS
OD_PTOSIS: 1+
OS_PTOSIS: 1+

## 2024-01-08 ASSESSMENT — LID POSITION - ECTROPION
OS_ECTROPION: LLL
OD_ECTROPION: RLL

## 2024-01-08 ASSESSMENT — CONFRONTATIONAL VISUAL FIELD TEST (CVF)
OD_FINDINGS: FULL
OS_FINDINGS: FULL

## 2024-01-22 ENCOUNTER — DOCTOR'S OFFICE (OUTPATIENT)
Dept: URBAN - NONMETROPOLITAN AREA CLINIC 1 | Facility: CLINIC | Age: 84
Setting detail: OPHTHALMOLOGY
End: 2024-01-22
Payer: COMMERCIAL

## 2024-01-22 DIAGNOSIS — H35.373: ICD-10-CM

## 2024-01-22 DIAGNOSIS — E11.3311: ICD-10-CM

## 2024-01-22 DIAGNOSIS — E11.3292: ICD-10-CM

## 2024-01-22 DIAGNOSIS — H35.3131: ICD-10-CM

## 2024-01-22 PROCEDURE — 92250 FUNDUS PHOTOGRAPHY W/I&R: CPT | Performed by: OPHTHALMOLOGY

## 2024-01-22 PROCEDURE — 92235 FLUORESCEIN ANGRPH MLTIFRAME: CPT | Performed by: OPHTHALMOLOGY

## 2024-01-22 PROCEDURE — 99213 OFFICE O/P EST LOW 20 MIN: CPT | Performed by: OPHTHALMOLOGY

## 2024-01-22 ASSESSMENT — CONFRONTATIONAL VISUAL FIELD TEST (CVF)
OS_FINDINGS: FULL
OD_FINDINGS: FULL

## 2024-01-22 ASSESSMENT — REFRACTION_AUTOREFRACTION
OD_CYLINDER: -2.00
OS_CYLINDER: -2.75
OS_AXIS: 072
OD_SPHERE: +0.50
OS_SPHERE: +2.25
OD_AXIS: 093

## 2024-01-22 ASSESSMENT — REFRACTION_MANIFEST
OD_SPHERE: +0.75
OS_SPHERE: +1.50
OS_CYLINDER: -1.25
OD_ADD: +2.75
OD_VA1: 20/25-2
OD_VA2: 20/25-2
OS_ADD: +2.75
OD_AXIS: 105
OS_VA2: 20/25-2
OD_CYLINDER: -1.75
OS_AXIS: 65
OS_VA1: 20/25-2

## 2024-01-22 ASSESSMENT — PUNCTA - ASSESSMENT
OD_PUNCTA: SIL PLUG
OS_PUNCTA: SIL PLUG

## 2024-01-22 ASSESSMENT — REFRACTION_CURRENTRX
OD_OVR_VA: 20/
OS_CYLINDER: -1.00
OD_ADD: +2.50
OD_VPRISM_DIRECTION: PROGS
OS_OVR_VA: 20/
OS_VPRISM_DIRECTION: PROGS
OS_ADD: +2.50
OD_SPHERE: +0.25
OS_AXIS: 067
OD_CYLINDER: -2.00
OS_SPHERE: +1.25
OD_AXIS: 102

## 2024-01-22 ASSESSMENT — LID POSITION - PTOSIS
OS_PTOSIS: 1+
OD_PTOSIS: 1+

## 2024-01-22 ASSESSMENT — SPHEQUIV_DERIVED
OD_SPHEQUIV: -0.5
OS_SPHEQUIV: 0.875
OD_SPHEQUIV: -0.125
OS_SPHEQUIV: 0.875

## 2024-01-22 ASSESSMENT — SUPERFICIAL PUNCTATE KERATITIS (SPK)
OS_SPK: 1+ 2+
OD_SPK: 1+ 2+

## 2024-01-22 ASSESSMENT — LID POSITION - ECTROPION
OS_ECTROPION: LLL
OD_ECTROPION: RLL

## 2024-01-22 ASSESSMENT — LID POSITION - COMMENTS
OS_COMMENTS: MRD1: 2/2
OD_COMMENTS: MRD1: 2/2

## 2024-01-22 ASSESSMENT — LID POSITION - DERMATOCHALASIS
OD_DERMATOCHALASIS: RUL 2+
OS_DERMATOCHALASIS: LUL 2+

## 2024-01-22 ASSESSMENT — LACRIMAL DUCT - ASSESSMENT
OD_LACRIMAL_DUCT: RLL
OS_LACRIMAL_DUCT: LLL

## 2024-01-22 ASSESSMENT — DRY EYES - PHYSICIAN NOTES
OD_GENERALCOMMENTS: KSICCA
OS_GENERALCOMMENTS: KSICCA

## 2024-02-12 ENCOUNTER — DOCTOR'S OFFICE (OUTPATIENT)
Dept: URBAN - NONMETROPOLITAN AREA CLINIC 1 | Facility: CLINIC | Age: 84
Setting detail: OPHTHALMOLOGY
End: 2024-02-12
Payer: COMMERCIAL

## 2024-02-12 DIAGNOSIS — E11.3311: ICD-10-CM

## 2024-02-12 DIAGNOSIS — H35.373: ICD-10-CM

## 2024-02-12 DIAGNOSIS — H35.3131: ICD-10-CM

## 2024-02-12 DIAGNOSIS — H04.121: ICD-10-CM

## 2024-02-12 DIAGNOSIS — H04.122: ICD-10-CM

## 2024-02-12 DIAGNOSIS — E11.3292: ICD-10-CM

## 2024-02-12 PROCEDURE — 92134 CPTRZ OPH DX IMG PST SGM RTA: CPT | Performed by: OPHTHALMOLOGY

## 2024-02-12 PROCEDURE — 83861 MICROFLUID ANALY TEARS: CPT | Mod: QW,LT | Performed by: OPHTHALMOLOGY

## 2024-02-12 PROCEDURE — 83861 MICROFLUID ANALY TEARS: CPT | Mod: QW,RT | Performed by: OPHTHALMOLOGY

## 2024-02-12 PROCEDURE — 99213 OFFICE O/P EST LOW 20 MIN: CPT | Performed by: OPHTHALMOLOGY

## 2024-02-12 ASSESSMENT — CONFRONTATIONAL VISUAL FIELD TEST (CVF)
OS_FINDINGS: FULL
OD_FINDINGS: FULL

## 2024-02-12 ASSESSMENT — LID POSITION - ECTROPION
OS_ECTROPION: LLL
OD_ECTROPION: RLL

## 2024-02-12 ASSESSMENT — LID POSITION - DERMATOCHALASIS
OD_DERMATOCHALASIS: RUL 2+
OS_DERMATOCHALASIS: LUL 2+

## 2024-02-12 ASSESSMENT — LID POSITION - PTOSIS
OD_PTOSIS: 1+
OS_PTOSIS: 1+

## 2024-02-12 ASSESSMENT — LID POSITION - COMMENTS
OD_COMMENTS: MRD1: 2/2
OS_COMMENTS: MRD1: 2/2

## 2024-03-11 ENCOUNTER — DOCTOR'S OFFICE (OUTPATIENT)
Dept: URBAN - NONMETROPOLITAN AREA CLINIC 1 | Facility: CLINIC | Age: 84
Setting detail: OPHTHALMOLOGY
End: 2024-03-11
Payer: MEDICARE

## 2024-03-11 DIAGNOSIS — H10.11: ICD-10-CM

## 2024-03-11 PROCEDURE — 99213 OFFICE O/P EST LOW 20 MIN: CPT | Performed by: OPHTHALMOLOGY

## 2024-03-11 ASSESSMENT — LID POSITION - COMMENTS
OS_COMMENTS: MRD1: 2/2
OD_COMMENTS: MRD1: 2/2

## 2024-03-11 ASSESSMENT — LID POSITION - DERMATOCHALASIS
OS_DERMATOCHALASIS: LUL 2+
OD_DERMATOCHALASIS: RUL 2+

## 2024-03-11 ASSESSMENT — LID POSITION - ECTROPION
OS_ECTROPION: LLL
OD_ECTROPION: RLL

## 2024-03-11 ASSESSMENT — LID POSITION - PTOSIS
OD_PTOSIS: 1+
OS_PTOSIS: 1+

## 2024-03-14 ENCOUNTER — RX ONLY (RX ONLY)
Age: 84
End: 2024-03-14

## 2024-03-14 ENCOUNTER — DOCTOR'S OFFICE (OUTPATIENT)
Dept: URBAN - NONMETROPOLITAN AREA CLINIC 1 | Facility: CLINIC | Age: 84
Setting detail: OPHTHALMOLOGY
End: 2024-03-14
Payer: MEDICARE

## 2024-03-14 DIAGNOSIS — H10.11: ICD-10-CM

## 2024-03-14 PROCEDURE — 99213 OFFICE O/P EST LOW 20 MIN: CPT | Performed by: OPHTHALMOLOGY

## 2024-03-14 ASSESSMENT — LID POSITION - ECTROPION
OS_ECTROPION: LLL
OD_ECTROPION: RLL

## 2024-03-14 ASSESSMENT — LID POSITION - COMMENTS
OS_COMMENTS: MRD1: 2/2
OD_COMMENTS: MRD1: 2/2

## 2024-03-14 ASSESSMENT — LID POSITION - DERMATOCHALASIS
OD_DERMATOCHALASIS: RUL 2+
OS_DERMATOCHALASIS: LUL 2+

## 2024-03-14 ASSESSMENT — LID POSITION - PTOSIS
OD_PTOSIS: 1+
OS_PTOSIS: 1+

## 2024-03-25 ENCOUNTER — DOCTOR'S OFFICE (OUTPATIENT)
Dept: URBAN - NONMETROPOLITAN AREA CLINIC 1 | Facility: CLINIC | Age: 84
Setting detail: OPHTHALMOLOGY
End: 2024-03-25
Payer: MEDICARE

## 2024-03-25 DIAGNOSIS — E11.3311: ICD-10-CM

## 2024-03-25 DIAGNOSIS — H04.121: ICD-10-CM

## 2024-03-25 DIAGNOSIS — H04.122: ICD-10-CM

## 2024-03-25 DIAGNOSIS — H10.503: ICD-10-CM

## 2024-03-25 DIAGNOSIS — E11.3292: ICD-10-CM

## 2024-03-25 DIAGNOSIS — H10.11: ICD-10-CM

## 2024-03-25 PROCEDURE — 99213 OFFICE O/P EST LOW 20 MIN: CPT | Performed by: OPHTHALMOLOGY

## 2024-03-25 PROCEDURE — 83861 MICROFLUID ANALY TEARS: CPT | Mod: QW,LT | Performed by: OPHTHALMOLOGY

## 2024-03-25 PROCEDURE — 83861 MICROFLUID ANALY TEARS: CPT | Mod: QW,RT | Performed by: OPHTHALMOLOGY

## 2024-03-25 PROCEDURE — 92134 CPTRZ OPH DX IMG PST SGM RTA: CPT | Performed by: OPHTHALMOLOGY

## 2024-03-25 ASSESSMENT — LID POSITION - DERMATOCHALASIS
OS_DERMATOCHALASIS: LUL 2+
OD_DERMATOCHALASIS: RUL 2+

## 2024-03-25 ASSESSMENT — LID POSITION - PTOSIS
OD_PTOSIS: 1+
OS_PTOSIS: 1+

## 2024-03-25 ASSESSMENT — LID POSITION - ECTROPION
OD_ECTROPION: RLL
OS_ECTROPION: LLL

## 2024-03-25 ASSESSMENT — LID POSITION - COMMENTS
OS_COMMENTS: MRD1: 2/2
OD_COMMENTS: MRD1: 2/2

## 2024-04-04 ENCOUNTER — DOCTOR'S OFFICE (OUTPATIENT)
Dept: URBAN - NONMETROPOLITAN AREA CLINIC 1 | Facility: CLINIC | Age: 84
Setting detail: OPHTHALMOLOGY
End: 2024-04-04
Payer: MEDICARE

## 2024-04-04 DIAGNOSIS — H40.1121: ICD-10-CM

## 2024-04-04 DIAGNOSIS — H10.11: ICD-10-CM

## 2024-04-04 DIAGNOSIS — H10.503: ICD-10-CM

## 2024-04-04 DIAGNOSIS — H40.1112: ICD-10-CM

## 2024-04-04 PROCEDURE — 99213 OFFICE O/P EST LOW 20 MIN: CPT | Performed by: OPHTHALMOLOGY

## 2024-04-04 ASSESSMENT — LID POSITION - ECTROPION
OD_ECTROPION: RLL
OS_ECTROPION: LLL

## 2024-04-04 ASSESSMENT — LID POSITION - DERMATOCHALASIS
OS_DERMATOCHALASIS: LUL 2+
OD_DERMATOCHALASIS: RUL 2+

## 2024-04-04 ASSESSMENT — LID POSITION - COMMENTS
OS_COMMENTS: MRD1: 2/2
OD_COMMENTS: MRD1: 2/2

## 2024-04-04 ASSESSMENT — LID POSITION - PTOSIS
OS_PTOSIS: 1+
OD_PTOSIS: 1+

## 2024-04-09 ENCOUNTER — DOCTOR'S OFFICE (OUTPATIENT)
Dept: URBAN - NONMETROPOLITAN AREA CLINIC 1 | Facility: CLINIC | Age: 84
Setting detail: OPHTHALMOLOGY
End: 2024-04-09
Payer: MEDICARE

## 2024-04-09 ENCOUNTER — RX ONLY (RX ONLY)
Age: 84
End: 2024-04-09

## 2024-04-09 DIAGNOSIS — H40.1112: ICD-10-CM

## 2024-04-09 DIAGNOSIS — H10.503: ICD-10-CM

## 2024-04-09 DIAGNOSIS — H40.1121: ICD-10-CM

## 2024-04-09 PROCEDURE — 92083 EXTENDED VISUAL FIELD XM: CPT | Performed by: OPHTHALMOLOGY

## 2024-04-09 PROCEDURE — 99213 OFFICE O/P EST LOW 20 MIN: CPT | Performed by: OPHTHALMOLOGY

## 2024-04-09 ASSESSMENT — LID POSITION - COMMENTS
OD_COMMENTS: MRD1: 2/2
OS_COMMENTS: MRD1: 2/2

## 2024-04-09 ASSESSMENT — LID POSITION - PTOSIS
OS_PTOSIS: 1+
OD_PTOSIS: 1+

## 2024-04-09 ASSESSMENT — LID POSITION - DERMATOCHALASIS
OS_DERMATOCHALASIS: LUL 2+
OD_DERMATOCHALASIS: RUL 2+

## 2024-04-09 ASSESSMENT — LID POSITION - ECTROPION
OS_ECTROPION: LLL
OD_ECTROPION: RLL

## 2024-05-10 ENCOUNTER — DOCTOR'S OFFICE (OUTPATIENT)
Dept: URBAN - NONMETROPOLITAN AREA CLINIC 1 | Facility: CLINIC | Age: 84
Setting detail: OPHTHALMOLOGY
End: 2024-05-10
Payer: MEDICARE

## 2024-05-10 ENCOUNTER — RX ONLY (RX ONLY)
Age: 84
End: 2024-05-10

## 2024-05-10 DIAGNOSIS — H40.1112: ICD-10-CM

## 2024-05-10 DIAGNOSIS — H40.1121: ICD-10-CM

## 2024-05-10 DIAGNOSIS — H04.122: ICD-10-CM

## 2024-05-10 DIAGNOSIS — H04.121: ICD-10-CM

## 2024-05-10 PROCEDURE — 99212 OFFICE O/P EST SF 10 MIN: CPT | Performed by: OPHTHALMOLOGY

## 2024-08-07 ENCOUNTER — RX ONLY (RX ONLY)
Age: 84
End: 2024-08-07

## 2024-08-07 ENCOUNTER — DOCTOR'S OFFICE (OUTPATIENT)
Dept: URBAN - NONMETROPOLITAN AREA CLINIC 1 | Facility: CLINIC | Age: 84
Setting detail: OPHTHALMOLOGY
End: 2024-08-07
Payer: MEDICARE

## 2024-08-07 DIAGNOSIS — E11.3311: ICD-10-CM

## 2024-08-07 DIAGNOSIS — E11.3292: ICD-10-CM

## 2024-08-07 DIAGNOSIS — H47.012: ICD-10-CM

## 2024-08-07 DIAGNOSIS — H04.121: ICD-10-CM

## 2024-08-07 DIAGNOSIS — H35.3131: ICD-10-CM

## 2024-08-07 DIAGNOSIS — H04.122: ICD-10-CM

## 2024-08-07 DIAGNOSIS — H40.1112: ICD-10-CM

## 2024-08-07 DIAGNOSIS — H43.813: ICD-10-CM

## 2024-08-07 DIAGNOSIS — H40.1121: ICD-10-CM

## 2024-08-07 DIAGNOSIS — H35.373: ICD-10-CM

## 2024-08-07 PROCEDURE — 92133 CPTRZD OPH DX IMG PST SGM ON: CPT | Performed by: OPHTHALMOLOGY

## 2024-08-07 PROCEDURE — 99213 OFFICE O/P EST LOW 20 MIN: CPT | Performed by: OPHTHALMOLOGY

## 2024-08-07 ASSESSMENT — LID POSITION - COMMENTS
OD_COMMENTS: MRD1: 2/2
OS_COMMENTS: MRD1: 2/2

## 2024-08-07 ASSESSMENT — LID POSITION - ECTROPION
OS_ECTROPION: LLL
OD_ECTROPION: RLL

## 2024-08-07 ASSESSMENT — LID POSITION - DERMATOCHALASIS
OD_DERMATOCHALASIS: RUL 2+
OS_DERMATOCHALASIS: LUL 2+

## 2024-08-07 ASSESSMENT — CONFRONTATIONAL VISUAL FIELD TEST (CVF)
OD_FINDINGS: FULL
OS_FINDINGS: FULL

## 2024-08-07 ASSESSMENT — LID POSITION - PTOSIS
OS_PTOSIS: 1+
OD_PTOSIS: 1+

## 2024-09-04 ENCOUNTER — DOCTOR'S OFFICE (OUTPATIENT)
Dept: URBAN - NONMETROPOLITAN AREA CLINIC 1 | Facility: CLINIC | Age: 84
Setting detail: OPHTHALMOLOGY
End: 2024-09-04
Payer: MEDICARE

## 2024-09-04 DIAGNOSIS — H10.523: ICD-10-CM

## 2024-09-04 DIAGNOSIS — H02.135: ICD-10-CM

## 2024-09-04 DIAGNOSIS — H02.132: ICD-10-CM

## 2024-09-04 DIAGNOSIS — H04.123: ICD-10-CM

## 2024-09-04 PROCEDURE — 92012 INTRM OPH EXAM EST PATIENT: CPT | Performed by: OPTOMETRIST

## 2024-09-04 ASSESSMENT — REFRACTION_MANIFEST
OS_VA2: 20/25-2
OS_ADD: +2.75
OD_VA1: 20/25-2
OD_VA2: 20/25-2
OS_VA1: 20/25-2
OD_CYLINDER: -1.75
OD_ADD: +2.75
OD_AXIS: 105
OS_AXIS: 65
OS_CYLINDER: -1.25
OS_SPHERE: +1.50
OD_SPHERE: +0.75

## 2024-09-04 ASSESSMENT — REFRACTION_AUTOREFRACTION
OD_CYLINDER: -2.25
OD_AXIS: 092
OD_SPHERE: +1.00
OS_AXIS: 081
OS_SPHERE: +3.25
OS_CYLINDER: -2.50

## 2024-09-04 ASSESSMENT — LID EXAM ASSESSMENTS
OS_ANGULAR_BLEPHARITIS: LLL LUL 1+
OD_ANGULAR_BLEPHARITIS: RLL RUL 1+

## 2024-09-04 ASSESSMENT — KERATOMETRY
OD_AXISANGLE_DEGREES: 118
OS_AXISANGLE_DEGREES: 064
OS_K2POWER_DIOPTERS: -1.25
OD_K1POWER_DIOPTERS: +0.75
OD_K2POWER_DIOPTERS: -2.00
OS_K1POWER_DIOPTERS: +1.00

## 2024-09-04 ASSESSMENT — LID POSITION - DERMATOCHALASIS
OD_DERMATOCHALASIS: RUL 2+
OS_DERMATOCHALASIS: LUL 2+

## 2024-09-04 ASSESSMENT — REFRACTION_CURRENTRX
OS_CYLINDER: -1.00
OD_VPRISM_DIRECTION: PROGS
OD_CYLINDER: -2.00
OS_ADD: +2.75
OD_VPRISM_DIRECTION: PROGS
OS_OVR_VA: 20/
OS_AXIS: 057
OD_SPHERE: +0.25
OS_VPRISM_DIRECTION: PROGS
OD_AXIS: 102
OD_SPHERE: +0.25
OS_SPHERE: +1.75
OD_OVR_VA: 20/
OS_OVR_VA: 20/
OS_VPRISM_DIRECTION: PROGS
OD_AXIS: 104
OS_ADD: +2.50
OD_CYLINDER: -1.75
OS_SPHERE: +1.25
OD_OVR_VA: 20/
OS_AXIS: 067
OD_ADD: +2.50
OD_ADD: +2.75
OS_CYLINDER: -1.25

## 2024-09-04 ASSESSMENT — SUPERFICIAL PUNCTATE KERATITIS (SPK)
OD_SPK: 1+
OS_SPK: 2+ 3+

## 2024-09-04 ASSESSMENT — PACHYMETRY
OS_CT_CORRECTION: -1
OS_CT_UM: 567
OD_CT_CORRECTION: -2
OD_CT_UM: 575

## 2024-09-04 ASSESSMENT — LID POSITION - COMMENTS
OS_COMMENTS: MRD1: 2/2
OD_COMMENTS: MRD1: 2/2

## 2024-09-04 ASSESSMENT — LACRIMAL DUCT - ASSESSMENT
OS_LACRIMAL_DUCT: LLL
OD_LACRIMAL_DUCT: RLL

## 2024-09-04 ASSESSMENT — TONOMETRY
OD_IOP_MMHG: 12
OS_IOP_MMHG: 12

## 2024-09-04 ASSESSMENT — VISUAL ACUITY
OD_BCVA: 20/30-1
OS_BCVA: 20/40-1

## 2024-09-04 ASSESSMENT — LID POSITION - PTOSIS
OS_PTOSIS: 1+
OD_PTOSIS: 1+

## 2024-09-04 ASSESSMENT — LID POSITION - ECTROPION
OS_ECTROPION: LLL
OD_ECTROPION: RLL

## 2024-09-04 ASSESSMENT — DRY EYES - PHYSICIAN NOTES: OS_GENERALCOMMENTS: CLOCK

## 2024-09-04 ASSESSMENT — PUNCTA - ASSESSMENT
OD_PUNCTA: SIL PLUG
OS_PUNCTA: SIL PLUG

## 2024-09-13 ENCOUNTER — RX ONLY (RX ONLY)
Age: 84
End: 2024-09-13

## 2024-09-13 ENCOUNTER — DOCTOR'S OFFICE (OUTPATIENT)
Dept: URBAN - NONMETROPOLITAN AREA CLINIC 1 | Facility: CLINIC | Age: 84
Setting detail: OPHTHALMOLOGY
End: 2024-09-13
Payer: MEDICARE

## 2024-09-13 DIAGNOSIS — H10.523: ICD-10-CM

## 2024-09-13 DIAGNOSIS — H02.135: ICD-10-CM

## 2024-09-13 DIAGNOSIS — H02.132: ICD-10-CM

## 2024-09-13 DIAGNOSIS — H04.123: ICD-10-CM

## 2024-09-13 PROCEDURE — 92012 INTRM OPH EXAM EST PATIENT: CPT | Performed by: OPTOMETRIST

## 2024-09-13 ASSESSMENT — REFRACTION_AUTOREFRACTION
OS_AXIS: 081
OD_AXIS: 092
OS_CYLINDER: -2.50
OD_SPHERE: +1.00
OS_SPHERE: +3.25
OD_CYLINDER: -2.25

## 2024-09-13 ASSESSMENT — REFRACTION_MANIFEST
OS_SPHERE: +1.50
OS_ADD: +2.75
OD_VA1: 20/25-2
OD_SPHERE: +0.75
OS_CYLINDER: -1.25
OS_AXIS: 65
OS_VA2: 20/25-2
OS_VA1: 20/25-2
OD_CYLINDER: -1.75
OD_AXIS: 105
OD_ADD: +2.75
OD_VA2: 20/25-2

## 2024-09-13 ASSESSMENT — REFRACTION_CURRENTRX
OD_OVR_VA: 20/
OD_CYLINDER: -2.00
OS_SPHERE: +1.75
OD_AXIS: 104
OD_AXIS: 102
OS_VPRISM_DIRECTION: PROGS
OS_ADD: +2.50
OS_AXIS: 057
OD_VPRISM_DIRECTION: PROGS
OD_SPHERE: +0.25
OS_OVR_VA: 20/
OD_ADD: +2.75
OS_CYLINDER: -1.00
OS_AXIS: 067
OD_OVR_VA: 20/
OS_ADD: +2.75
OD_VPRISM_DIRECTION: PROGS
OD_SPHERE: +0.25
OD_ADD: +2.50
OS_VPRISM_DIRECTION: PROGS
OS_OVR_VA: 20/
OD_CYLINDER: -1.75
OS_SPHERE: +1.25
OS_CYLINDER: -1.25

## 2024-09-13 ASSESSMENT — PACHYMETRY
OD_CT_UM: 575
OS_CT_UM: 567
OS_CT_CORRECTION: -1
OD_CT_CORRECTION: -2

## 2024-09-13 ASSESSMENT — LACRIMAL DUCT - ASSESSMENT
OS_LACRIMAL_DUCT: LLL
OD_LACRIMAL_DUCT: RLL

## 2024-09-13 ASSESSMENT — LID POSITION - ECTROPION
OD_ECTROPION: RLL
OS_ECTROPION: LLL

## 2024-09-13 ASSESSMENT — KERATOMETRY
OS_K2POWER_DIOPTERS: -1.25
OS_K1POWER_DIOPTERS: +1.00
OD_K1POWER_DIOPTERS: +0.75
OS_AXISANGLE_DEGREES: 064
OD_AXISANGLE_DEGREES: 118
OD_K2POWER_DIOPTERS: -2.00

## 2024-09-13 ASSESSMENT — PUNCTA - ASSESSMENT
OS_PUNCTA: SIL PLUG
OD_PUNCTA: SIL PLUG

## 2024-09-13 ASSESSMENT — LID POSITION - PTOSIS
OD_PTOSIS: 1+
OS_PTOSIS: 1+

## 2024-09-13 ASSESSMENT — LID POSITION - COMMENTS
OD_COMMENTS: MRD1: 2/2
OS_COMMENTS: MRD1: 2/2

## 2024-09-13 ASSESSMENT — TONOMETRY
OD_IOP_MMHG: 12
OS_IOP_MMHG: 12

## 2024-09-13 ASSESSMENT — LID POSITION - DERMATOCHALASIS
OD_DERMATOCHALASIS: RUL 2+
OS_DERMATOCHALASIS: LUL 2+

## 2024-09-13 ASSESSMENT — LID EXAM ASSESSMENTS
OD_ANGULAR_BLEPHARITIS: RLL RUL T
OS_ANGULAR_BLEPHARITIS: LLL LUL T

## 2024-09-13 ASSESSMENT — SUPERFICIAL PUNCTATE KERATITIS (SPK)
OS_SPK: 2+ 3+
OD_SPK: 1+

## 2024-09-13 ASSESSMENT — DRY EYES - PHYSICIAN NOTES: OS_GENERALCOMMENTS: CLOCK

## 2024-09-13 ASSESSMENT — VISUAL ACUITY
OD_BCVA: 20/50+2
OS_BCVA: 20/50+2

## 2025-02-26 ENCOUNTER — DOCTOR'S OFFICE (OUTPATIENT)
Dept: URBAN - NONMETROPOLITAN AREA CLINIC 1 | Facility: CLINIC | Age: 85
Setting detail: OPHTHALMOLOGY
End: 2025-02-26
Payer: MEDICARE

## 2025-02-26 VITALS — HEIGHT: 55 IN

## 2025-02-26 DIAGNOSIS — H35.3131: ICD-10-CM

## 2025-02-26 DIAGNOSIS — H02.834: ICD-10-CM

## 2025-02-26 DIAGNOSIS — H04.122: ICD-10-CM

## 2025-02-26 DIAGNOSIS — H02.132: ICD-10-CM

## 2025-02-26 DIAGNOSIS — H02.831: ICD-10-CM

## 2025-02-26 DIAGNOSIS — H40.1121: ICD-10-CM

## 2025-02-26 DIAGNOSIS — H35.373: ICD-10-CM

## 2025-02-26 DIAGNOSIS — H47.012: ICD-10-CM

## 2025-02-26 DIAGNOSIS — H04.121: ICD-10-CM

## 2025-02-26 DIAGNOSIS — H02.135: ICD-10-CM

## 2025-02-26 DIAGNOSIS — H40.1113: ICD-10-CM

## 2025-02-26 DIAGNOSIS — E11.9: ICD-10-CM

## 2025-02-26 PROBLEM — H31.002 CHORIORETINAL SCARS; LEFT EYE: Status: ACTIVE | Noted: 2025-02-26

## 2025-02-26 PROCEDURE — 92083 EXTENDED VISUAL FIELD XM: CPT | Performed by: OPHTHALMOLOGY

## 2025-02-26 PROCEDURE — 92134 CPTRZ OPH DX IMG PST SGM RTA: CPT | Performed by: OPHTHALMOLOGY

## 2025-02-26 PROCEDURE — 76514 ECHO EXAM OF EYE THICKNESS: CPT | Performed by: OPHTHALMOLOGY

## 2025-02-26 PROCEDURE — 92014 COMPRE OPH EXAM EST PT 1/>: CPT | Performed by: OPHTHALMOLOGY

## 2025-02-26 ASSESSMENT — REFRACTION_AUTOREFRACTION
OD_CYLINDER: -1.50
OS_CYLINDER: -2.50
OD_AXIS: 094
OD_SPHERE: 0.00
OS_SPHERE: +2.00
OS_AXIS: 061

## 2025-02-26 ASSESSMENT — KERATOMETRY
OD_K1POWER_DIOPTERS: 43.75
OS_K2POWER_DIOPTERS: 44.75
OS_AXISANGLE_DEGREES: 154
OS_K1POWER_DIOPTERS: 43.25
OD_AXISANGLE_DEGREES: 006
OD_K2POWER_DIOPTERS: 44.75

## 2025-02-26 ASSESSMENT — REFRACTION_CURRENTRX
OD_VPRISM_DIRECTION: PROGS
OD_AXIS: 104
OD_VPRISM_DIRECTION: PROGS
OD_SPHERE: +0.25
OS_CYLINDER: -1.00
OS_SPHERE: +1.25
OD_SPHERE: +0.25
OD_AXIS: 102
OS_OVR_VA: 20/
OD_CYLINDER: -2.00
OD_CYLINDER: -1.75
OS_ADD: +2.75
OS_AXIS: 067
OD_ADD: +2.50
OS_OVR_VA: 20/
OD_OVR_VA: 20/
OS_ADD: +2.50
OS_AXIS: 057
OS_CYLINDER: -1.25
OD_OVR_VA: 20/
OS_VPRISM_DIRECTION: PROGS
OS_VPRISM_DIRECTION: PROGS
OS_SPHERE: +1.75
OD_ADD: +2.75

## 2025-02-26 ASSESSMENT — VISUAL ACUITY
OD_BCVA: 20/30+1
OS_BCVA: 20/30-2

## 2025-02-26 ASSESSMENT — REFRACTION_MANIFEST
OD_CYLINDER: -1.75
OS_CYLINDER: -1.25
OD_VA2: 20/25-2
OS_VA1: 20/25-2
OS_ADD: +2.75
OD_SPHERE: +0.75
OD_AXIS: 105
OS_AXIS: 65
OD_ADD: +2.75
OD_VA1: 20/25-2
OS_SPHERE: +1.50
OS_VA2: 20/25-2

## 2025-02-26 ASSESSMENT — PACHYMETRY
OD_CT_CORRECTION: -2
OS_CT_CORRECTION: -1
OD_CT_UM: 575
OS_CT_UM: 567

## 2025-02-26 ASSESSMENT — LID POSITION - DERMATOCHALASIS
OS_DERMATOCHALASIS: LUL 2+
OD_DERMATOCHALASIS: RUL 2+

## 2025-02-26 ASSESSMENT — TONOMETRY
OD_IOP_MMHG: 11
OS_IOP_MMHG: 10

## 2025-02-26 ASSESSMENT — LID POSITION - ECTROPION
OD_ECTROPION: RLL
OS_ECTROPION: LLL

## 2025-02-26 ASSESSMENT — LID POSITION - PTOSIS
OS_PTOSIS: 1+
OD_PTOSIS: 1+

## 2025-02-26 ASSESSMENT — LID POSITION - COMMENTS
OD_COMMENTS: MRD1: 2/2
OS_COMMENTS: MRD1: 2/2

## 2025-02-26 ASSESSMENT — LACRIMAL DUCT - ASSESSMENT
OD_LACRIMAL_DUCT: RLL
OS_LACRIMAL_DUCT: LLL

## 2025-02-26 ASSESSMENT — PUNCTA - ASSESSMENT
OS_PUNCTA: SIL PLUG
OD_PUNCTA: SIL PLUG

## 2025-02-26 ASSESSMENT — SUPERFICIAL PUNCTATE KERATITIS (SPK)
OS_SPK: 2+
OD_SPK: 2+

## 2025-04-17 ENCOUNTER — DOCTOR'S OFFICE (OUTPATIENT)
Dept: URBAN - NONMETROPOLITAN AREA CLINIC 1 | Facility: CLINIC | Age: 85
Setting detail: OPHTHALMOLOGY
End: 2025-04-17
Payer: MEDICARE

## 2025-04-17 DIAGNOSIS — H04.121: ICD-10-CM

## 2025-04-17 DIAGNOSIS — E11.3212: ICD-10-CM

## 2025-04-17 DIAGNOSIS — H04.122: ICD-10-CM

## 2025-04-17 DIAGNOSIS — E11.3291: ICD-10-CM

## 2025-04-17 PROCEDURE — 99213 OFFICE O/P EST LOW 20 MIN: CPT | Performed by: OPHTHALMOLOGY

## 2025-04-17 PROCEDURE — 92134 CPTRZ OPH DX IMG PST SGM RTA: CPT | Performed by: OPHTHALMOLOGY

## 2025-04-17 ASSESSMENT — SUPERFICIAL PUNCTATE KERATITIS (SPK)
OD_SPK: 2+
OS_SPK: 2+

## 2025-04-17 ASSESSMENT — PUNCTA - ASSESSMENT
OS_PUNCTA: SIL PLUG
OD_PUNCTA: SIL PLUG

## 2025-04-17 ASSESSMENT — REFRACTION_CURRENTRX
OD_VPRISM_DIRECTION: PROGS
OS_VPRISM_DIRECTION: PROGS
OS_OVR_VA: 20/
OD_OVR_VA: 20/
OD_VPRISM_DIRECTION: PROGS
OS_SPHERE: +1.25
OD_ADD: +2.50
OD_CYLINDER: -1.75
OS_SPHERE: +1.75
OS_CYLINDER: -1.25
OS_OVR_VA: 20/
OS_AXIS: 067
OD_AXIS: 102
OD_ADD: +2.75
OD_SPHERE: +0.25
OS_ADD: +2.50
OS_AXIS: 057
OD_AXIS: 104
OD_CYLINDER: -2.00
OD_SPHERE: +0.25
OS_VPRISM_DIRECTION: PROGS
OS_CYLINDER: -1.00
OS_ADD: +2.75
OD_OVR_VA: 20/

## 2025-04-17 ASSESSMENT — REFRACTION_MANIFEST
OS_CYLINDER: -1.25
OS_SPHERE: +1.50
OD_ADD: +2.75
OD_VA1: 20/25-2
OD_CYLINDER: -1.75
OS_AXIS: 65
OS_ADD: +2.75
OD_SPHERE: +0.75
OD_VA2: 20/25-2
OS_VA2: 20/25-2
OS_VA1: 20/25-2
OD_AXIS: 105

## 2025-04-17 ASSESSMENT — REFRACTION_AUTOREFRACTION
OS_SPHERE: +2.00
OD_AXIS: 094
OS_CYLINDER: -2.50
OD_SPHERE: 0.00
OS_AXIS: 061
OD_CYLINDER: -1.50

## 2025-04-17 ASSESSMENT — KERATOMETRY
OS_AXISANGLE_DEGREES: 154
OS_K2POWER_DIOPTERS: 44.75
OD_K1POWER_DIOPTERS: 43.75
OD_K2POWER_DIOPTERS: 44.75
OD_AXISANGLE_DEGREES: 006
OS_K1POWER_DIOPTERS: 43.25

## 2025-04-17 ASSESSMENT — VISUAL ACUITY
OD_BCVA: 20/50+1
OS_BCVA: 20/40+2

## 2025-04-17 ASSESSMENT — LID POSITION - ECTROPION
OS_ECTROPION: LLL
OD_ECTROPION: RLL

## 2025-04-17 ASSESSMENT — LACRIMAL DUCT - ASSESSMENT
OD_LACRIMAL_DUCT: RLL
OS_LACRIMAL_DUCT: LLL

## 2025-04-17 ASSESSMENT — LID POSITION - PTOSIS
OD_PTOSIS: 1+
OS_PTOSIS: 1+

## 2025-04-17 ASSESSMENT — LID POSITION - DERMATOCHALASIS
OS_DERMATOCHALASIS: LUL 2+
OD_DERMATOCHALASIS: RUL 2+

## 2025-04-17 ASSESSMENT — LID POSITION - COMMENTS
OS_COMMENTS: MRD1: 2/2
OD_COMMENTS: MRD1: 2/2

## 2025-06-05 ENCOUNTER — APPOINTMENT (OUTPATIENT)
Dept: URBAN - NONMETROPOLITAN AREA CLINIC 4 | Facility: CLINIC | Age: 85
Setting detail: DERMATOLOGY
End: 2025-06-05

## 2025-06-05 DIAGNOSIS — D485 NEOPLASM OF UNCERTAIN BEHAVIOR OF SKIN: ICD-10-CM

## 2025-06-05 DIAGNOSIS — L30.4 ERYTHEMA INTERTRIGO: ICD-10-CM

## 2025-06-05 PROBLEM — D48.5 NEOPLASM OF UNCERTAIN BEHAVIOR OF SKIN: Status: ACTIVE | Noted: 2025-06-05

## 2025-06-05 PROCEDURE — ? PHOTO-DOCUMENTATION

## 2025-06-05 PROCEDURE — ? SHAVE REMOVAL

## 2025-06-05 PROCEDURE — ? COUNSELING

## 2025-06-05 PROCEDURE — ? PRESCRIPTION MEDICATION MANAGEMENT

## 2025-06-05 PROCEDURE — ? BIOPSY BY SHAVE METHOD

## 2025-06-05 PROCEDURE — ? PRESCRIPTION

## 2025-06-05 RX ORDER — CLOTRIMAZOLE AND BETAMETHASONE DIPROPIONATE 10; .5 MG/G; MG/G
1-0.05% CREAM TOPICAL BID
Qty: 45 | Refills: 3 | Status: ERX | COMMUNITY
Start: 2025-06-05

## 2025-06-05 RX ADMIN — CLOTRIMAZOLE AND BETAMETHASONE DIPROPIONATE 1-0.05%: 10; .5 CREAM TOPICAL at 00:00

## 2025-06-05 ASSESSMENT — LOCATION SIMPLE DESCRIPTION DERM
LOCATION SIMPLE: ABDOMEN
LOCATION SIMPLE: LEFT AXILLARY VAULT
LOCATION SIMPLE: LEFT SHOULDER
LOCATION SIMPLE: RIGHT SHOULDER

## 2025-06-05 ASSESSMENT — LOCATION ZONE DERM
LOCATION ZONE: TRUNK
LOCATION ZONE: ARM
LOCATION ZONE: AXILLAE

## 2025-06-05 ASSESSMENT — LOCATION DETAILED DESCRIPTION DERM
LOCATION DETAILED: LEFT AXILLARY VAULT
LOCATION DETAILED: RIGHT ANTERIOR SHOULDER
LOCATION DETAILED: LEFT POSTERIOR SHOULDER
LOCATION DETAILED: PERIUMBILICAL SKIN

## 2025-06-05 NOTE — HPI: RASH
What Type Of Note Output Would You Prefer (Optional)?: Standard Output
Is The Patient Presenting As Previously Scheduled?: Yes
How Severe Is Your Rash?: moderate
Is This A New Presentation, Or A Follow-Up?: Rash
Additional History: Patient was using Nystatin cream in the past, which helped this rash.

## 2025-06-05 NOTE — PROCEDURE: BIOPSY BY SHAVE METHOD
Detail Level: Detailed
Depth Of Biopsy: dermis
Was A Bandage Applied: Yes
Size Of Lesion In Cm: 0
Biopsy Type: H and E
Biopsy Method: Dermablade
Anesthesia Type: 1% lidocaine with epinephrine
Anesthesia Volume In Cc: 0.5
Hemostasis: Electrodesiccation
Wound Care: Petrolatum
Dressing: bandage
Curettage Text: The wound bed was treated with curettage after the biopsy was performed.
Cryotherapy Text: The wound bed was treated with cryotherapy after the biopsy was performed.
Electrodesiccation Text: The wound bed was treated with electrodesiccation after the biopsy was performed.
Electrodesiccation And Curettage Text: The wound bed was treated with electrodesiccation and curettage after the biopsy was performed.
Silver Nitrate Text: The wound bed was treated with silver nitrate after the biopsy was performed.
Lab: 6
Lab Facility: 3
Path Notes (To The Dermatopathologist): Please check margins
Render Path Notes In Note?: No
Medical Necessity Information: It is in your best interest to select a reason for this procedure from the list below. All of these items fulfill various CMS LCD requirements except the new and changing color options.
Consent: Written consent was obtained and risks were reviewed including but not limited to scarring, infection, bleeding, scabbing, incomplete removal, nerve damage and allergy to anesthesia.
Post-Care Instructions: I reviewed with the patient in detail post-care instructions. Patient is to keep the biopsy site dry overnight, and then apply bacitracin twice daily until healed. Patient may apply hydrogen peroxide soaks to remove any crusting.
Notification Instructions: Patient will be notified of biopsy results. However, patient instructed to call the office if not contacted within 2 weeks.
Billing Type: Third-Party Bill
Information: Selecting Yes will display possible errors in your note based on the variables you have selected. This validation is only offered as a suggestion for you. PLEASE NOTE THAT THE VALIDATION TEXT WILL BE REMOVED WHEN YOU FINALIZE YOUR NOTE. IF YOU WANT TO FAX A PRELIMINARY NOTE YOU WILL NEED TO TOGGLE THIS TO 'NO' IF YOU DO NOT WANT IT IN YOUR FAXED NOTE.

## 2025-06-05 NOTE — PROCEDURE: PRESCRIPTION MEDICATION MANAGEMENT
Detail Level: Zone
Initiate Treatment: Clotrimazole-betamethasone 1%-0.05% cream BID.
Render In Strict Bullet Format?: No

## 2025-06-05 NOTE — PROCEDURE: SHAVE REMOVAL
Medical Necessity Information: It is in your best interest to select a reason for this procedure from the list below. All of these items fulfill various CMS LCD requirements except the new and changing color options.
Medical Necessity Clause: This procedure was medically necessary because the lesion that was treated was:
Lab: 6
Lab Facility: 3
Detail Level: Detailed
Was A Bandage Applied: Yes
Size Of Lesion In Cm (Required): 1.1
X Size Of Lesion In Cm (Optional): 0
Depth Of Shave: dermis
Biopsy Method: Dermablade
Anesthesia Type: 1% lidocaine with epinephrine
Hemostasis: Electrocautery and Aluminum Chloride
Wound Care: Petrolatum
Path Notes (To The Dermatopathologist): Please check margins
Render Path Notes In Note?: No
Consent was obtained from the patient. The risks and benefits to therapy were discussed in detail. Specifically, the risks of infection, scarring, bleeding, prolonged wound healing, incomplete removal, allergy to anesthesia, nerve injury and recurrence were addressed. Prior to the procedure, the treatment site was clearly identified and confirmed by the patient. All components of Universal Protocol/PAUSE Rule completed.
Post-Care Instructions: I reviewed with the patient in detail post-care instructions. Patient is to keep the biopsy site dry overnight, and then apply bacitracin twice daily until healed. Patient may apply hydrogen peroxide soaks to remove any crusting.
Notification Instructions: Patient will be notified of pathology results. However, patient instructed to call the office if not contacted within 2 weeks.
Billing Type: Third-Party Bill
Size Of Lesion In Cm (Required): 1.8

## 2025-06-16 ENCOUNTER — APPOINTMENT (OUTPATIENT)
Dept: URBAN - NONMETROPOLITAN AREA CLINIC 4 | Facility: CLINIC | Age: 85
Setting detail: DERMATOLOGY
End: 2025-06-16

## 2025-06-16 DIAGNOSIS — Z48.817 ENCOUNTER FOR SURGICAL AFTERCARE FOLLOWING SURGERY ON THE SKIN AND SUBCUTANEOUS TISSUE: ICD-10-CM | Status: RESOLVING

## 2025-06-16 PROCEDURE — ? PHOTO-DOCUMENTATION

## 2025-06-16 PROCEDURE — ? COUNSELING

## 2025-06-16 PROCEDURE — ? PRESCRIPTION MEDICATION MANAGEMENT

## 2025-06-16 PROCEDURE — ? ADDITIONAL NOTES

## 2025-06-16 PROCEDURE — ? PRESCRIPTION

## 2025-06-16 PROCEDURE — ? TREATMENT REGIMEN

## 2025-06-16 RX ORDER — MUPIROCIN 20 MG/G
2% OINTMENT TOPICAL
Qty: 22 | Refills: 1 | Status: ERX | COMMUNITY
Start: 2025-06-16

## 2025-06-16 RX ADMIN — MUPIROCIN 2%: 20 OINTMENT TOPICAL at 00:00

## 2025-06-16 ASSESSMENT — LOCATION DETAILED DESCRIPTION DERM
LOCATION DETAILED: LEFT LATERAL SUPERIOR CHEST
LOCATION DETAILED: LEFT ANTERIOR MEDIAL PROXIMAL UPPER ARM

## 2025-06-16 ASSESSMENT — LOCATION ZONE DERM
LOCATION ZONE: TRUNK
LOCATION ZONE: ARM

## 2025-06-16 ASSESSMENT — LOCATION SIMPLE DESCRIPTION DERM
LOCATION SIMPLE: LEFT UPPER ARM
LOCATION SIMPLE: CHEST

## 2025-06-16 NOTE — PROCEDURE: ADDITIONAL NOTES
Detail Level: Zone
Additional Notes: Mupirocin 2% prescribed to prophyllactically to prevent infection.
Render Risk Assessment In Note?: no

## 2025-06-16 NOTE — PROCEDURE: PRESCRIPTION MEDICATION MANAGEMENT
Render In Strict Bullet Format?: No
Initiate Treatment: Mupirocin 2% ointment TID x 10 days
Detail Level: Zone

## 2025-06-19 ENCOUNTER — APPOINTMENT (OUTPATIENT)
Dept: URBAN - NONMETROPOLITAN AREA CLINIC 4 | Facility: CLINIC | Age: 85
Setting detail: DERMATOLOGY
End: 2025-06-19

## 2025-06-19 PROBLEM — D03.62 MELANOMA IN SITU OF LEFT UPPER LIMB, INCLUDING SHOULDER: Status: ACTIVE | Noted: 2025-06-19

## 2025-06-19 PROCEDURE — ? EXCISION

## 2025-06-19 PROCEDURE — ? PHOTO-DOCUMENTATION

## 2025-06-19 PROCEDURE — ? COUNSELING

## 2025-06-19 PROCEDURE — ? TREATMENT REGIMEN

## 2025-06-19 NOTE — PROCEDURE: EXCISION
Body Location Override (Optional - Billing Will Still Be Based On Selected Body Map Location If Applicable): Left posterior shoulder
Size Of Lesion In Cm: 1.5
X Size Of Lesion In Cm (Optional): 0
Size Of Margin In Cm: 1
Anesthesia Volume In Cc: 5
Was An Eye Clamp Used?: No
Eye Clamp Note Details: An eye clamp was used during the procedure.
Excision Method: Elliptical
Saucerization Depth: dermis and superficial adipose tissue
Repair Type: Complex
Intermediate / Complex Repair - Final Wound Length In Cm: 3.5
Deep Sutures: 4-0 Vicryl
Epidermal Sutures: 3-0 Ethilon
Suture Removal: 21 days
Suturegard Retention Suture: 2-0 Nylon
Retention Suture Bite Size: 3 mm
Length To Time In Minutes Device Was In Place: 10
Complex Requirements: Extensive Undermining Performed?: Yes
Width Of Defect Perpendicular To Closure In Cm (Required): 2
Distance Of Undermining In Cm (Required): 3
Undermining Type: Entire Wound
Debridement Text: The wound edges were debrided prior to proceeding with the closure to facilitate wound healing.
Helical Rim Text: The closure involved the helical rim.
Vermilion Border Text: The closure involved the vermilion border.
Nostril Rim Text: The closure involved the nostril rim.
Retention Suture Text: Retention sutures were placed to support the closure and prevent dehiscence.
Lab: 6
Graft Donor Site Bandage (Optional-Leave Blank If You Don't Want In Note): Steri-strips and a pressure bandage were applied to the donor site.
Epidermal Closure Graft Donor Site (Optional): simple interrupted
Billing Type: Third-Party Bill
Excision Depth: adipose tissue
Scalpel Size: 15 blade
Anesthesia Type: 0.5% marcaine
Hemostasis: Ligature
Estimated Blood Loss (Cc): minimal
Detail Level: Detailed
Repair Depth: use same depth as excision depth
Wound Care: Petrolatum
Dressing: pressure dressing
Suturegard Intro: Intraoperative tissue expansion was performed, utilizing the SUTUREGARD device, in order to reduce wound tension.
Suturegard Body: The suture ends were repeatedly re-tightened and re-clamped to achieve the desired tissue expansion.
Hemigard Intro: Due to skin fragility and wound tension, it was decided to use HEMIGARD adhesive retention suture devices to permit a linear closure. The skin was cleaned and dried for a 6cm distance away from the wound. Excessive hair, if present, was removed to allow for adhesion.
Hemigard Postcare Instructions: The HEMIGARD strips are to remain completely dry for at least 5-7 days.
Positioning (Leave Blank If You Do Not Want): The patient was placed in a comfortable position exposing the surgical site.
Pre-Excision Curettage Text (Leave Blank If You Do Not Want): Prior to drawing the surgical margin the visible lesion was removed with electrodesiccation and curettage to clearly define the lesion size.
Complex Repair Preamble Text (Leave Blank If You Do Not Want): Extensive wide undermining was performed.
Intermediate Repair Preamble Text (Leave Blank If You Do Not Want): Undermining was performed with blunt dissection.
Curvilinear Excision Additional Text (Leave Blank If You Do Not Want): The margin was drawn around the clinically apparent lesion.  A curvilinear shape was then drawn on the skin incorporating the lesion and margins.  Incisions were then made along these lines to the appropriate tissue plane and the lesion was extirpated.
Fusiform Excision Additional Text (Leave Blank If You Do Not Want): The margin was drawn around the clinically apparent lesion.  A fusiform shape was then drawn on the skin incorporating the lesion and margins.  Incisions were then made along these lines to the appropriate tissue plane and the lesion was extirpated.
Elliptical Excision Additional Text (Leave Blank If You Do Not Want): The margin was drawn around the clinically apparent lesion.  An elliptical shape was then drawn on the skin incorporating the lesion and margins.  Incisions were then made along these lines to the appropriate tissue plane and the lesion was extirpated.
Saucerization Excision Additional Text (Leave Blank If You Do Not Want): The margin was drawn around the clinically apparent lesion.  Incisions were then made along these lines, in a tangential fashion, to the appropriate tissue plane and the lesion was extirpated.
Slit Excision Additional Text (Leave Blank If You Do Not Want): A linear line was drawn on the skin overlying the lesion. An incision was made slowly until the lesion was visualized.  Once visualized, the lesion was removed with blunt dissection.
Excisional Biopsy Additional Text (Leave Blank If You Do Not Want): The margin was drawn around the clinically apparent lesion. An elliptical shape was then drawn on the skin incorporating the lesion and margins.  Incisions were then made along these lines to the appropriate tissue plane and the lesion was extirpated.
Perilesional Excision Additional Text (Leave Blank If You Do Not Want): The margin was drawn around the clinically apparent lesion. Incisions were then made along these lines to the appropriate tissue plane and the lesion was extirpated.
Repair Performed By Another Provider Text (Leave Blank If You Do Not Want): After the tissue was excised the defect was repaired by another provider.
No Repair - Repaired With Adjacent Surgical Defect Text (Leave Blank If You Do Not Want): After the excision the defect was repaired concurrently with another surgical defect which was in close approximation.
Adjacent Tissue Transfer Text: The defect edges were debeveled with a #15 scalpel blade.  Given the location of the defect and the proximity to free margins an adjacent tissue transfer was deemed most appropriate.  Using a sterile surgical marker, an appropriate flap was drawn incorporating the defect and placing the expected incisions within the relaxed skin tension lines where possible.    The area thus outlined was incised deep to adipose tissue with a #15 scalpel blade.  The skin margins were undermined to an appropriate distance in all directions utilizing iris scissors.
Advancement Flap (Single) Text: The defect edges were debeveled with a #15 scalpel blade.  Given the location of the defect and the proximity to free margins a single advancement flap was deemed most appropriate.  Using a sterile surgical marker, an appropriate advancement flap was drawn incorporating the defect and placing the expected incisions within the relaxed skin tension lines where possible.    The area thus outlined was incised deep to adipose tissue with a #15 scalpel blade.  The skin margins were undermined to an appropriate distance in all directions utilizing iris scissors.
Advancement Flap (Double) Text: The defect edges were debeveled with a #15 scalpel blade.  Given the location of the defect and the proximity to free margins a double advancement flap was deemed most appropriate.  Using a sterile surgical marker, the appropriate advancement flaps were drawn incorporating the defect and placing the expected incisions within the relaxed skin tension lines where possible.    The area thus outlined was incised deep to adipose tissue with a #15 scalpel blade.  The skin margins were undermined to an appropriate distance in all directions utilizing iris scissors.
Burow's Advancement Flap Text: The defect edges were debeveled with a #15 scalpel blade.  Given the location of the defect and the proximity to free margins a Burow's advancement flap was deemed most appropriate.  Using a sterile surgical marker, the appropriate advancement flap was drawn incorporating the defect and placing the expected incisions within the relaxed skin tension lines where possible.    The area thus outlined was incised deep to adipose tissue with a #15 scalpel blade.  The skin margins were undermined to an appropriate distance in all directions utilizing iris scissors.
Chonodrocutaneous Helical Advancement Flap Text: The defect edges were debeveled with a #15 scalpel blade.  Given the location of the defect and the proximity to free margins a chondrocutaneous helical advancement flap was deemed most appropriate.  Using a sterile surgical marker, the appropriate advancement flap was drawn incorporating the defect and placing the expected incisions within the relaxed skin tension lines where possible.    The area thus outlined was incised deep to adipose tissue with a #15 scalpel blade.  The skin margins were undermined to an appropriate distance in all directions utilizing iris scissors.
Crescentic Advancement Flap Text: The defect edges were debeveled with a #15 scalpel blade.  Given the location of the defect and the proximity to free margins a crescentic advancement flap was deemed most appropriate.  Using a sterile surgical marker, the appropriate advancement flap was drawn incorporating the defect and placing the expected incisions within the relaxed skin tension lines where possible.    The area thus outlined was incised deep to adipose tissue with a #15 scalpel blade.  The skin margins were undermined to an appropriate distance in all directions utilizing iris scissors.
A-T Advancement Flap Text: The defect edges were debeveled with a #15 scalpel blade.  Given the location of the defect, shape of the defect and the proximity to free margins an A-T advancement flap was deemed most appropriate.  Using a sterile surgical marker, an appropriate advancement flap was drawn incorporating the defect and placing the expected incisions within the relaxed skin tension lines where possible.    The area thus outlined was incised deep to adipose tissue with a #15 scalpel blade.  The skin margins were undermined to an appropriate distance in all directions utilizing iris scissors.
O-T Advancement Flap Text: The defect edges were debeveled with a #15 scalpel blade.  Given the location of the defect, shape of the defect and the proximity to free margins an O-T advancement flap was deemed most appropriate.  Using a sterile surgical marker, an appropriate advancement flap was drawn incorporating the defect and placing the expected incisions within the relaxed skin tension lines where possible.    The area thus outlined was incised deep to adipose tissue with a #15 scalpel blade.  The skin margins were undermined to an appropriate distance in all directions utilizing iris scissors.
O-L Flap Text: The defect edges were debeveled with a #15 scalpel blade.  Given the location of the defect, shape of the defect and the proximity to free margins an O-L flap was deemed most appropriate.  Using a sterile surgical marker, an appropriate advancement flap was drawn incorporating the defect and placing the expected incisions within the relaxed skin tension lines where possible.    The area thus outlined was incised deep to adipose tissue with a #15 scalpel blade.  The skin margins were undermined to an appropriate distance in all directions utilizing iris scissors.
O-Z Flap Text: The defect edges were debeveled with a #15 scalpel blade.  Given the location of the defect, shape of the defect and the proximity to free margins an O-Z flap was deemed most appropriate.  Using a sterile surgical marker, an appropriate transposition flap was drawn incorporating the defect and placing the expected incisions within the relaxed skin tension lines where possible. The area thus outlined was incised deep to adipose tissue with a #15 scalpel blade.  The skin margins were undermined to an appropriate distance in all directions utilizing iris scissors.
Double O-Z Flap Text: The defect edges were debeveled with a #15 scalpel blade.  Given the location of the defect, shape of the defect and the proximity to free margins a Double O-Z flap was deemed most appropriate.  Using a sterile surgical marker, an appropriate transposition flap was drawn incorporating the defect and placing the expected incisions within the relaxed skin tension lines where possible. The area thus outlined was incised deep to adipose tissue with a #15 scalpel blade.  The skin margins were undermined to an appropriate distance in all directions utilizing iris scissors.
V-Y Flap Text: The defect edges were debeveled with a #15 scalpel blade.  Given the location of the defect, shape of the defect and the proximity to free margins a V-Y flap was deemed most appropriate.  Using a sterile surgical marker, an appropriate advancement flap was drawn incorporating the defect and placing the expected incisions within the relaxed skin tension lines where possible.    The area thus outlined was incised deep to adipose tissue with a #15 scalpel blade.  The skin margins were undermined to an appropriate distance in all directions utilizing iris scissors.
Advancement-Rotation Flap Text: The defect edges were debeveled with a #15 scalpel blade.  Given the location of the defect, shape of the defect and the proximity to free margins an advancement-rotation flap was deemed most appropriate.  Using a sterile surgical marker, an appropriate flap was drawn incorporating the defect and placing the expected incisions within the relaxed skin tension lines where possible. The area thus outlined was incised deep to adipose tissue with a #15 scalpel blade.  The skin margins were undermined to an appropriate distance in all directions utilizing iris scissors.
Mercedes Flap Text: The defect edges were debeveled with a #15 scalpel blade.  Given the location of the defect, shape of the defect and the proximity to free margins a Mercedes flap was deemed most appropriate.  Using a sterile surgical marker, an appropriate advancement flap was drawn incorporating the defect and placing the expected incisions within the relaxed skin tension lines where possible. The area thus outlined was incised deep to adipose tissue with a #15 scalpel blade.  The skin margins were undermined to an appropriate distance in all directions utilizing iris scissors.
Modified Advancement Flap Text: The defect edges were debeveled with a #15 scalpel blade.  Given the location of the defect, shape of the defect and the proximity to free margins a modified advancement flap was deemed most appropriate.  Using a sterile surgical marker, an appropriate advancement flap was drawn incorporating the defect and placing the expected incisions within the relaxed skin tension lines where possible.    The area thus outlined was incised deep to adipose tissue with a #15 scalpel blade.  The skin margins were undermined to an appropriate distance in all directions utilizing iris scissors.
Mucosal Advancement Flap Text: Given the location of the defect, shape of the defect and the proximity to free margins a mucosal advancement flap was deemed most appropriate. Incisions were made with a 15 blade scalpel in the appropriate fashion along the cutaneous vermilion border and the mucosal lip. The remaining actinically damaged mucosal tissue was excised.  The mucosal advancement flap was then elevated to the gingival sulcus with care taken to preserve the neurovascular structures and advanced into the primary defect. Care was taken to ensure that precise realignment of the vermilion border was achieved.
Peng Advancement Flap Text: The defect edges were debeveled with a #15 scalpel blade.  Given the location of the defect, shape of the defect and the proximity to free margins a Peng advancement flap was deemed most appropriate.  Using a sterile surgical marker, an appropriate advancement flap was drawn incorporating the defect and placing the expected incisions within the relaxed skin tension lines where possible. The area thus outlined was incised deep to adipose tissue with a #15 scalpel blade.  The skin margins were undermined to an appropriate distance in all directions utilizing iris scissors.
Hatchet Flap Text: The defect edges were debeveled with a #15 scalpel blade.  Given the location of the defect, shape of the defect and the proximity to free margins a hatchet flap was deemed most appropriate.  Using a sterile surgical marker, an appropriate hatchet flap was drawn incorporating the defect and placing the expected incisions within the relaxed skin tension lines where possible.    The area thus outlined was incised deep to adipose tissue with a #15 scalpel blade.  The skin margins were undermined to an appropriate distance in all directions utilizing iris scissors.
Rotation Flap Text: The defect edges were debeveled with a #15 scalpel blade.  Given the location of the defect, shape of the defect and the proximity to free margins a rotation flap was deemed most appropriate.  Using a sterile surgical marker, an appropriate rotation flap was drawn incorporating the defect and placing the expected incisions within the relaxed skin tension lines where possible.    The area thus outlined was incised deep to adipose tissue with a #15 scalpel blade.  The skin margins were undermined to an appropriate distance in all directions utilizing iris scissors.
Bilateral Rotation Flap Text: The defect edges were debeveled with a #15 scalpel blade. Given the location of the defect, shape of the defect and the proximity to free margins a bilateral rotation flap was deemed most appropriate. Using a sterile surgical marker, an appropriate rotation flap was drawn incorporating the defect and placing the expected incisions within the relaxed skin tension lines where possible. The area thus outlined was incised deep to adipose tissue with a #15 scalpel blade. The skin margins were undermined to an appropriate distance in all directions utilizing iris scissors. Following this, the designed flap was carried over into the primary defect and sutured into place.
Spiral Flap Text: The defect edges were debeveled with a #15 scalpel blade.  Given the location of the defect, shape of the defect and the proximity to free margins a spiral flap was deemed most appropriate.  Using a sterile surgical marker, an appropriate rotation flap was drawn incorporating the defect and placing the expected incisions within the relaxed skin tension lines where possible. The area thus outlined was incised deep to adipose tissue with a #15 scalpel blade.  The skin margins were undermined to an appropriate distance in all directions utilizing iris scissors.
Staged Advancement Flap Text: The defect edges were debeveled with a #15 scalpel blade.  Given the location of the defect, shape of the defect and the proximity to free margins a staged advancement flap was deemed most appropriate.  Using a sterile surgical marker, an appropriate advancement flap was drawn incorporating the defect and placing the expected incisions within the relaxed skin tension lines where possible. The area thus outlined was incised deep to adipose tissue with a #15 scalpel blade.  The skin margins were undermined to an appropriate distance in all directions utilizing iris scissors.
Star Wedge Flap Text: The defect edges were debeveled with a #15 scalpel blade.  Given the location of the defect, shape of the defect and the proximity to free margins a star wedge flap was deemed most appropriate.  Using a sterile surgical marker, an appropriate rotation flap was drawn incorporating the defect and placing the expected incisions within the relaxed skin tension lines where possible. The area thus outlined was incised deep to adipose tissue with a #15 scalpel blade.  The skin margins were undermined to an appropriate distance in all directions utilizing iris scissors.
Transposition Flap Text: The defect edges were debeveled with a #15 scalpel blade.  Given the location of the defect and the proximity to free margins a transposition flap was deemed most appropriate.  Using a sterile surgical marker, an appropriate transposition flap was drawn incorporating the defect.    The area thus outlined was incised deep to adipose tissue with a #15 scalpel blade.  The skin margins were undermined to an appropriate distance in all directions utilizing iris scissors.
Muscle Hinge Flap Text: The defect edges were debeveled with a #15 scalpel blade.  Given the size, depth and location of the defect and the proximity to free margins a muscle hinge flap was deemed most appropriate.  Using a sterile surgical marker, an appropriate hinge flap was drawn incorporating the defect. The area thus outlined was incised with a #15 scalpel blade.  The skin margins were undermined to an appropriate distance in all directions utilizing iris scissors.
Mustarde Flap Text: The defect edges were debeveled with a #15 scalpel blade.  Given the size, depth and location of the defect and the proximity to free margins a Mustarde flap was deemed most appropriate.  Using a sterile surgical marker, an appropriate flap was drawn incorporating the defect. The area thus outlined was incised with a #15 scalpel blade.  The skin margins were undermined to an appropriate distance in all directions utilizing iris scissors.
Nasal Turnover Hinge Flap Text: The defect edges were debeveled with a #15 scalpel blade.  Given the size, depth, location of the defect and the defect being full thickness a nasal turnover hinge flap was deemed most appropriate.  Using a sterile surgical marker, an appropriate hinge flap was drawn incorporating the defect. The area thus outlined was incised with a #15 scalpel blade. The flap was designed to recreate the nasal mucosal lining and the alar rim. The skin margins were undermined to an appropriate distance in all directions utilizing iris scissors.
Nasalis-Muscle-Based Myocutaneous Island Pedicle Flap Text: Using a #15 blade, an incision was made around the donor flap to the level of the nasalis muscle. Wide lateral undermining was then performed in both the subcutaneous plane above the nasalis muscle, and in a submuscular plane just above periosteum. This allowed the formation of a free nasalis muscle axial pedicle (based on the angular artery) which was still attached to the actual cutaneous flap, increasing its mobility and vascular viability. Hemostasis was obtained with pinpoint electrocoagulation. The flap was mobilized into position and the pivotal anchor points positioned and stabilized with buried interrupted sutures. Subcutaneous and dermal tissues were closed in a multilayered fashion with sutures. Tissue redundancies were excised, and the epidermal edges were apposed without significant tension and sutured with sutures.
Nasalis Myocutaneous Flap Text: Using a #15 blade, an incision was made around the donor flap to the level of the nasalis muscle. Wide lateral undermining was then performed in both the subcutaneous plane above the nasalis muscle, and in a submuscular plane just above periosteum. This allowed the formation of a free nasalis muscle axial pedicle which was still attached to the actual cutaneous flap, increasing its mobility and vascular viability. Hemostasis was obtained with pinpoint electrocoagulation. The flap was mobilized into position and the pivotal anchor points positioned and stabilized with buried interrupted sutures. Subcutaneous and dermal tissues were closed in a multilayered fashion with sutures. Tissue redundancies were excised, and the epidermal edges were apposed without significant tension and sutured with sutures.
Nasolabial Transposition Flap Text: The defect edges were debeveled with a #15 scalpel blade.  Given the size, depth and location of the defect and the proximity to free margins a nasolabial transposition flap was deemed most appropriate. Using a sterile surgical marker, an appropriate flap was drawn incorporating the defect. The area thus outlined was incised with a #15 scalpel blade. The skin margins were undermined to an appropriate distance in all directions utilizing iris scissors. Following this, the designed flap was carried into the primary defect and sutured into place.
Orbicularis Oris Muscle Flap Text: The defect edges were debeveled with a #15 scalpel blade.  Given that the defect affected the competency of the oral sphincter an orbicularis oris muscle flap was deemed most appropriate to restore this competency and normal muscle function.  Using a sterile surgical marker, an appropriate flap was drawn incorporating the defect. The area thus outlined was incised with a #15 scalpel blade.
Melolabial Transposition Flap Text: The defect edges were debeveled with a #15 scalpel blade.  Given the location of the defect and the proximity to free margins a melolabial flap was deemed most appropriate.  Using a sterile surgical marker, an appropriate melolabial transposition flap was drawn incorporating the defect.    The area thus outlined was incised deep to adipose tissue with a #15 scalpel blade.  The skin margins were undermined to an appropriate distance in all directions utilizing iris scissors.
Rectangular Flap Text: The defect edges were debeveled with a #15 scalpel blade. Given the location of the defect and the proximity to free margins a rectangular flap was deemed most appropriate. Using a sterile surgical marker, an appropriate rectangular flap was drawn incorporating the defect. The area thus outlined was incised deep to adipose tissue with a #15 scalpel blade. The skin margins were undermined to an appropriate distance in all directions utilizing iris scissors. Following this, the designed flap was carried over into the primary defect and sutured into place.
Rhombic Flap Text: The defect edges were debeveled with a #15 scalpel blade.  Given the location of the defect and the proximity to free margins a rhombic flap was deemed most appropriate.  Using a sterile surgical marker, an appropriate rhombic flap was drawn incorporating the defect.    The area thus outlined was incised deep to adipose tissue with a #15 scalpel blade.  The skin margins were undermined to an appropriate distance in all directions utilizing iris scissors.
Rhomboid Transposition Flap Text: The defect edges were debeveled with a #15 scalpel blade.  Given the location of the defect and the proximity to free margins a rhomboid transposition flap was deemed most appropriate.  Using a sterile surgical marker, an appropriate rhomboid flap was drawn incorporating the defect.    The area thus outlined was incised deep to adipose tissue with a #15 scalpel blade.  The skin margins were undermined to an appropriate distance in all directions utilizing iris scissors.
Bi-Rhombic Flap Text: The defect edges were debeveled with a #15 scalpel blade.  Given the location of the defect and the proximity to free margins a bi-rhombic flap was deemed most appropriate.  Using a sterile surgical marker, an appropriate rhombic flap was drawn incorporating the defect. The area thus outlined was incised deep to adipose tissue with a #15 scalpel blade.  The skin margins were undermined to an appropriate distance in all directions utilizing iris scissors.
Helical Rim Advancement Flap Text: The defect edges were debeveled with a #15 blade scalpel.  Given the location of the defect and the proximity to free margins (helical rim) a double helical rim advancement flap was deemed most appropriate.  Using a sterile surgical marker, the appropriate advancement flaps were drawn incorporating the defect and placing the expected incisions between the helical rim and antihelix where possible.  The area thus outlined was incised through and through with a #15 scalpel blade.  With a skin hook and iris scissors, the flaps were gently and sharply undermined and freed up.
Bilateral Helical Rim Advancement Flap Text: The defect edges were debeveled with a #15 blade scalpel.  Given the location of the defect and the proximity to free margins (helical rim) a bilateral helical rim advancement flap was deemed most appropriate.  Using a sterile surgical marker, the appropriate advancement flaps were drawn incorporating the defect and placing the expected incisions between the helical rim and antihelix where possible.  The area thus outlined was incised through and through with a #15 scalpel blade.  With a skin hook and iris scissors, the flaps were gently and sharply undermined and freed up.
Ear Star Wedge Flap Text: The defect edges were debeveled with a #15 blade scalpel.  Given the location of the defect and the proximity to free margins (helical rim) an ear star wedge flap was deemed most appropriate.  Using a sterile surgical marker, the appropriate flap was drawn incorporating the defect and placing the expected incisions between the helical rim and antihelix where possible.  The area thus outlined was incised through and through with a #15 scalpel blade.
Flip-Flop Flap Text: The defect edges were debeveled with a #15 blade scalpel.  Given the location of the defect and the proximity to free margins a flip-flop flap was deemed most appropriate. Using a sterile surgical marker, the appropriate flap was drawn incorporating the defect and placing the expected incisions between the helical rim and antihelix where possible.  The area thus outlined was incised through and through with a #15 scalpel blade. Following this, the designed flap was carried over into the primary defect and sutured into place.
Banner Transposition Flap Text: The defect edges were debeveled with a #15 scalpel blade.  Given the location of the defect and the proximity to free margins a Banner transposition flap was deemed most appropriate.  Using a sterile surgical marker, an appropriate flap drawn around the defect. The area thus outlined was incised deep to adipose tissue with a #15 scalpel blade.  The skin margins were undermined to an appropriate distance in all directions utilizing iris scissors.
Bilobed Flap Text: The defect edges were debeveled with a #15 scalpel blade.  Given the location of the defect and the proximity to free margins a bilobe flap was deemed most appropriate.  Using a sterile surgical marker, an appropriate bilobe flap drawn around the defect.    The area thus outlined was incised deep to adipose tissue with a #15 scalpel blade.  The skin margins were undermined to an appropriate distance in all directions utilizing iris scissors.
Bilobed Transposition Flap Text: The defect edges were debeveled with a #15 scalpel blade.  Given the location of the defect and the proximity to free margins a bilobed transposition flap was deemed most appropriate.  Using a sterile surgical marker, an appropriate bilobe flap drawn around the defect.    The area thus outlined was incised deep to adipose tissue with a #15 scalpel blade.  The skin margins were undermined to an appropriate distance in all directions utilizing iris scissors.
Trilobed Flap Text: The defect edges were debeveled with a #15 scalpel blade.  Given the location of the defect and the proximity to free margins a trilobed flap was deemed most appropriate.  Using a sterile surgical marker, an appropriate trilobed flap drawn around the defect.    The area thus outlined was incised deep to adipose tissue with a #15 scalpel blade.  The skin margins were undermined to an appropriate distance in all directions utilizing iris scissors.
Dorsal Nasal Flap Text: The defect edges were debeveled with a #15 scalpel blade.  Given the location of the defect and the proximity to free margins a dorsal nasal flap was deemed most appropriate.  Using a sterile surgical marker, an appropriate dorsal nasal flap was drawn around the defect.    The area thus outlined was incised deep to adipose tissue with a #15 scalpel blade.  The skin margins were undermined to an appropriate distance in all directions utilizing iris scissors.
Island Pedicle Flap Text: The defect edges were debeveled with a #15 scalpel blade.  Given the location of the defect, shape of the defect and the proximity to free margins an island pedicle advancement flap was deemed most appropriate.  Using a sterile surgical marker, an appropriate advancement flap was drawn incorporating the defect, outlining the appropriate donor tissue and placing the expected incisions within the relaxed skin tension lines where possible.    The area thus outlined was incised deep to adipose tissue with a #15 scalpel blade.  The skin margins were undermined to an appropriate distance in all directions around the primary defect and laterally outward around the island pedicle utilizing iris scissors.  There was minimal undermining beneath the pedicle flap.
Island Pedicle Flap With Canthal Suspension Text: The defect edges were debeveled with a #15 scalpel blade.  Given the location of the defect, shape of the defect and the proximity to free margins an island pedicle advancement flap was deemed most appropriate.  Using a sterile surgical marker, an appropriate advancement flap was drawn incorporating the defect, outlining the appropriate donor tissue and placing the expected incisions within the relaxed skin tension lines where possible. The area thus outlined was incised deep to adipose tissue with a #15 scalpel blade.  The skin margins were undermined to an appropriate distance in all directions around the primary defect and laterally outward around the island pedicle utilizing iris scissors.  There was minimal undermining beneath the pedicle flap. A suspension suture was placed in the canthal tendon to prevent tension and prevent ectropion.
Alar Island Pedicle Flap Text: The defect edges were debeveled with a #15 scalpel blade.  Given the location of the defect, shape of the defect and the proximity to the alar rim an island pedicle advancement flap was deemed most appropriate.  Using a sterile surgical marker, an appropriate advancement flap was drawn incorporating the defect, outlining the appropriate donor tissue and placing the expected incisions within the nasal ala running parallel to the alar rim. The area thus outlined was incised with a #15 scalpel blade.  The skin margins were undermined minimally to an appropriate distance in all directions around the primary defect and laterally outward around the island pedicle utilizing iris scissors.  There was minimal undermining beneath the pedicle flap.
Double Island Pedicle Flap Text: The defect edges were debeveled with a #15 scalpel blade.  Given the location of the defect, shape of the defect and the proximity to free margins a double island pedicle advancement flap was deemed most appropriate.  Using a sterile surgical marker, an appropriate advancement flap was drawn incorporating the defect, outlining the appropriate donor tissue and placing the expected incisions within the relaxed skin tension lines where possible.    The area thus outlined was incised deep to adipose tissue with a #15 scalpel blade.  The skin margins were undermined to an appropriate distance in all directions around the primary defect and laterally outward around the island pedicle utilizing iris scissors.  There was minimal undermining beneath the pedicle flap.
Island Pedicle Flap-Requiring Vessel Identification Text: The defect edges were debeveled with a #15 scalpel blade.  Given the location of the defect, shape of the defect and the proximity to free margins an island pedicle advancement flap was deemed most appropriate.  Using a sterile surgical marker, an appropriate advancement flap was drawn, based on the axial vessel mentioned above, incorporating the defect, outlining the appropriate donor tissue and placing the expected incisions within the relaxed skin tension lines where possible.    The area thus outlined was incised deep to adipose tissue with a #15 scalpel blade.  The skin margins were undermined to an appropriate distance in all directions around the primary defect and laterally outward around the island pedicle utilizing iris scissors.  There was minimal undermining beneath the pedicle flap.
Keystone Flap Text: The defect edges were debeveled with a #15 scalpel blade.  Given the location of the defect, shape of the defect a keystone flap was deemed most appropriate.  Using a sterile surgical marker, an appropriate keystone flap was drawn incorporating the defect, outlining the appropriate donor tissue and placing the expected incisions within the relaxed skin tension lines where possible. The area thus outlined was incised deep to adipose tissue with a #15 scalpel blade.  The skin margins were undermined to an appropriate distance in all directions around the primary defect and laterally outward around the flap utilizing iris scissors.
O-T Plasty Text: The defect edges were debeveled with a #15 scalpel blade.  Given the location of the defect, shape of the defect and the proximity to free margins an O-T plasty was deemed most appropriate.  Using a sterile surgical marker, an appropriate O-T plasty was drawn incorporating the defect and placing the expected incisions within the relaxed skin tension lines where possible.    The area thus outlined was incised deep to adipose tissue with a #15 scalpel blade.  The skin margins were undermined to an appropriate distance in all directions utilizing iris scissors.
O-Z Plasty Text: The defect edges were debeveled with a #15 scalpel blade.  Given the location of the defect, shape of the defect and the proximity to free margins an O-Z plasty (double transposition flap) was deemed most appropriate.  Using a sterile surgical marker, the appropriate transposition flaps were drawn incorporating the defect and placing the expected incisions within the relaxed skin tension lines where possible.    The area thus outlined was incised deep to adipose tissue with a #15 scalpel blade.  The skin margins were undermined to an appropriate distance in all directions utilizing iris scissors.  Hemostasis was achieved with electrocautery.  The flaps were then transposed into place, one clockwise and the other counterclockwise, and anchored with interrupted buried subcutaneous sutures.
Double O-Z Plasty Text: The defect edges were debeveled with a #15 scalpel blade.  Given the location of the defect, shape of the defect and the proximity to free margins a Double O-Z plasty (double transposition flap) was deemed most appropriate.  Using a sterile surgical marker, the appropriate transposition flaps were drawn incorporating the defect and placing the expected incisions within the relaxed skin tension lines where possible. The area thus outlined was incised deep to adipose tissue with a #15 scalpel blade.  The skin margins were undermined to an appropriate distance in all directions utilizing iris scissors.  Hemostasis was achieved with electrocautery.  The flaps were then transposed into place, one clockwise and the other counterclockwise, and anchored with interrupted buried subcutaneous sutures.
V-Y Plasty Text: The defect edges were debeveled with a #15 scalpel blade.  Given the location of the defect, shape of the defect and the proximity to free margins an V-Y advancement flap was deemed most appropriate.  Using a sterile surgical marker, an appropriate advancement flap was drawn incorporating the defect and placing the expected incisions within the relaxed skin tension lines where possible.    The area thus outlined was incised deep to adipose tissue with a #15 scalpel blade.  The skin margins were undermined to an appropriate distance in all directions utilizing iris scissors.
H Plasty Text: Given the location of the defect, shape of the defect and the proximity to free margins a H-plasty was deemed most appropriate for repair.  Using a sterile surgical marker, the appropriate advancement arms of the H-plasty were drawn incorporating the defect and placing the expected incisions within the relaxed skin tension lines where possible. The area thus outlined was incised deep to adipose tissue with a #15 scalpel blade. The skin margins were undermined to an appropriate distance in all directions utilizing iris scissors.  The opposing advancement arms were then advanced into place in opposite direction and anchored with interrupted buried subcutaneous sutures.
W Plasty Text: The lesion was extirpated to the level of the fat with a #15 scalpel blade.  Given the location of the defect, shape of the defect and the proximity to free margins a W-plasty was deemed most appropriate for repair.  Using a sterile surgical marker, the appropriate transposition arms of the W-plasty were drawn incorporating the defect and placing the expected incisions within the relaxed skin tension lines where possible.    The area thus outlined was incised deep to adipose tissue with a #15 scalpel blade.  The skin margins were undermined to an appropriate distance in all directions utilizing iris scissors.  The opposing transposition arms were then transposed into place in opposite direction and anchored with interrupted buried subcutaneous sutures.
Z Plasty Text: The lesion was extirpated to the level of the fat with a #15 scalpel blade.  Given the location of the defect, shape of the defect and the proximity to free margins a Z-plasty was deemed most appropriate for repair.  Using a sterile surgical marker, the appropriate transposition arms of the Z-plasty were drawn incorporating the defect and placing the expected incisions within the relaxed skin tension lines where possible.    The area thus outlined was incised deep to adipose tissue with a #15 scalpel blade.  The skin margins were undermined to an appropriate distance in all directions utilizing iris scissors.  The opposing transposition arms were then transposed into place in opposite direction and anchored with interrupted buried subcutaneous sutures.
Double Z Plasty Text: The lesion was extirpated to the level of the fat with a #15 scalpel blade. Given the location of the defect, shape of the defect and the proximity to free margins a double Z-plasty was deemed most appropriate for repair. Using a sterile surgical marker, the appropriate transposition arms of the double Z-plasty were drawn incorporating the defect and placing the expected incisions within the relaxed skin tension lines where possible. The area thus outlined was incised deep to adipose tissue with a #15 scalpel blade. The skin margins were undermined to an appropriate distance in all directions utilizing iris scissors. The opposing transposition arms were then transposed and carried over into place in opposite direction and anchored with interrupted buried subcutaneous sutures.
Zygomaticofacial Flap Text: Given the location of the defect, shape of the defect and the proximity to free margins a zygomaticofacial flap was deemed most appropriate for repair.  Using a sterile surgical marker, the appropriate flap was drawn incorporating the defect and placing the expected incisions within the relaxed skin tension lines where possible. The area thus outlined was incised deep to adipose tissue with a #15 scalpel blade with preservation of a vascular pedicle.  The skin margins were undermined to an appropriate distance in all directions utilizing iris scissors.  The flap was then placed into the defect and anchored with interrupted buried subcutaneous sutures.
Cheek Interpolation Flap Text: A decision was made to reconstruct the defect utilizing an interpolation axial flap and a staged reconstruction.  A telfa template was made of the defect.  This telfa template was then used to outline the Cheek Interpolation flap.  The donor area for the pedicle flap was then injected with anesthesia.  The flap was excised through the skin and subcutaneous tissue down to the layer of the underlying musculature.  The interpolation flap was carefully excised within this deep plane to maintain its blood supply.  The edges of the donor site were undermined.   The donor site was closed in a primary fashion.  The pedicle was then rotated into position and sutured.  Once the tube was sutured into place, adequate blood supply was confirmed with blanching and refill.  The pedicle was then wrapped with xeroform gauze and dressed appropriately with a telfa and gauze bandage to ensure continued blood supply and protect the attached pedicle.
Cheek-To-Nose Interpolation Flap Text: A decision was made to reconstruct the defect utilizing an interpolation axial flap and a staged reconstruction.  A telfa template was made of the defect.  This telfa template was then used to outline the Cheek-To-Nose Interpolation flap.  The donor area for the pedicle flap was then injected with anesthesia.  The flap was excised through the skin and subcutaneous tissue down to the layer of the underlying musculature.  The interpolation flap was carefully excised within this deep plane to maintain its blood supply.  The edges of the donor site were undermined.   The donor site was closed in a primary fashion.  The pedicle was then rotated into position and sutured.  Once the tube was sutured into place, adequate blood supply was confirmed with blanching and refill.  The pedicle was then wrapped with xeroform gauze and dressed appropriately with a telfa and gauze bandage to ensure continued blood supply and protect the attached pedicle.
Interpolation Flap Text: A decision was made to reconstruct the defect utilizing an interpolation axial flap and a staged reconstruction.  A telfa template was made of the defect.  This telfa template was then used to outline the interpolation flap.  The donor area for the pedicle flap was then injected with anesthesia.  The flap was excised through the skin and subcutaneous tissue down to the layer of the underlying musculature.  The interpolation flap was carefully excised within this deep plane to maintain its blood supply.  The edges of the donor site were undermined.   The donor site was closed in a primary fashion.  The pedicle was then rotated into position and sutured.  Once the tube was sutured into place, adequate blood supply was confirmed with blanching and refill.  The pedicle was then wrapped with xeroform gauze and dressed appropriately with a telfa and gauze bandage to ensure continued blood supply and protect the attached pedicle.
Melolabial Interpolation Flap Text: A decision was made to reconstruct the defect utilizing an interpolation axial flap and a staged reconstruction.  A telfa template was made of the defect.  This telfa template was then used to outline the melolabial interpolation flap.  The donor area for the pedicle flap was then injected with anesthesia.  The flap was excised through the skin and subcutaneous tissue down to the layer of the underlying musculature.  The pedicle flap was carefully excised within this deep plane to maintain its blood supply.  The edges of the donor site were undermined.   The donor site was closed in a primary fashion.  The pedicle was then rotated into position and sutured.  Once the tube was sutured into place, adequate blood supply was confirmed with blanching and refill.  The pedicle was then wrapped with xeroform gauze and dressed appropriately with a telfa and gauze bandage to ensure continued blood supply and protect the attached pedicle.
Mastoid Interpolation Flap Text: A decision was made to reconstruct the defect utilizing an interpolation axial flap and a staged reconstruction.  A telfa template was made of the defect.  This telfa template was then used to outline the mastoid interpolation flap.  The donor area for the pedicle flap was then injected with anesthesia.  The flap was excised through the skin and subcutaneous tissue down to the layer of the underlying musculature.  The pedicle flap was carefully excised within this deep plane to maintain its blood supply.  The edges of the donor site were undermined.   The donor site was closed in a primary fashion.  The pedicle was then rotated into position and sutured.  Once the tube was sutured into place, adequate blood supply was confirmed with blanching and refill.  The pedicle was then wrapped with xeroform gauze and dressed appropriately with a telfa and gauze bandage to ensure continued blood supply and protect the attached pedicle.
Posterior Auricular Interpolation Flap Text: A decision was made to reconstruct the defect utilizing an interpolation axial flap and a staged reconstruction.  A telfa template was made of the defect.  This telfa template was then used to outline the posterior auricular interpolation flap.  The donor area for the pedicle flap was then injected with anesthesia.  The flap was excised through the skin and subcutaneous tissue down to the layer of the underlying musculature.  The pedicle flap was carefully excised within this deep plane to maintain its blood supply.  The edges of the donor site were undermined.   The donor site was closed in a primary fashion.  The pedicle was then rotated into position and sutured.  Once the tube was sutured into place, adequate blood supply was confirmed with blanching and refill.  The pedicle was then wrapped with xeroform gauze and dressed appropriately with a telfa and gauze bandage to ensure continued blood supply and protect the attached pedicle.
Paramedian Forehead Flap Text: A decision was made to reconstruct the defect utilizing an interpolation axial flap and a staged reconstruction.  A telfa template was made of the defect.  This telfa template was then used to outline the paramedian forehead pedicle flap.  The donor area for the pedicle flap was then injected with anesthesia.  The flap was excised through the skin and subcutaneous tissue down to the layer of the underlying musculature.  The pedicle flap was carefully excised within this deep plane to maintain its blood supply.  The edges of the donor site were undermined.   The donor site was closed in a primary fashion.  The pedicle was then rotated into position and sutured.  Once the tube was sutured into place, adequate blood supply was confirmed with blanching and refill.  The pedicle was then wrapped with xeroform gauze and dressed appropriately with a telfa and gauze bandage to ensure continued blood supply and protect the attached pedicle.
Abbe Flap (Upper To Lower Lip) Text: The defect of the lower lip was assessed and measured.  Given the location and size of the defect, an Abbe flap was deemed most appropriate. Using a sterile surgical marker, an appropriate Abbe flap was measured and drawn on the upper lip. Local anesthesia was then infiltrated.  A scalpel was then used to incise the upper lip through and through the skin, vermilion, muscle and mucosa, leaving the flap pedicled on the opposite side.  The flap was then rotated and transferred to the lower lip defect.  The flap was then sutured into place with a three layer technique, closing the orbicularis oris muscle layer with subcutaneous buried sutures, followed by a mucosal layer and an epidermal layer.
Abbe Flap (Lower To Upper Lip) Text: The defect of the upper lip was assessed and measured.  Given the location and size of the defect, an Abbe flap was deemed most appropriate. Using a sterile surgical marker, an appropriate Abbe flap was measured and drawn on the lower lip. Local anesthesia was then infiltrated. A scalpel was then used to incise the upper lip through and through the skin, vermilion, muscle and mucosa, leaving the flap pedicled on the opposite side.  The flap was then rotated and transferred to the lower lip defect.  The flap was then sutured into place with a three layer technique, closing the orbicularis oris muscle layer with subcutaneous buried sutures, followed by a mucosal layer and an epidermal layer.
Estlander Flap (Upper To Lower Lip) Text: The defect of the lower lip was assessed and measured.  Given the location and size of the defect, an Estlander flap was deemed most appropriate. Using a sterile surgical marker, an appropriate Estlander flap was measured and drawn on the upper lip. Local anesthesia was then infiltrated. A scalpel was then used to incise the lateral aspect of the flap, through skin, muscle and mucosa, leaving the flap pedicled medially.  The flap was then rotated and positioned to fill the lower lip defect.  The flap was then sutured into place with a three layer technique, closing the orbicularis oris muscle layer with subcutaneous buried sutures, followed by a mucosal layer and an epidermal layer.
Lip Wedge Excision Repair Text: Given the location of the defect and the proximity to free margins a full thickness wedge repair was deemed most appropriate.  Using a sterile surgical marker, the appropriate repair was drawn incorporating the defect and placing the expected incisions perpendicular to the vermilion border.  The vermilion border was also meticulously outlined to ensure appropriate reapproximation during the repair.  The area thus outlined was incised through and through with a #15 scalpel blade.  The muscularis and dermis were reaproximated with deep sutures following hemostasis. Care was taken to realign the vermilion border before proceeding with the superficial closure.  Once the vermilion was realigned the superfical and mucosal closure was finished.
Ftsg Text: The defect edges were debeveled with a #15 scalpel blade.  Given the location of the defect, shape of the defect and the proximity to free margins a full thickness skin graft was deemed most appropriate.  Using a sterile surgical marker, the primary defect shape was transferred to the donor site. The area thus outlined was incised deep to adipose tissue with a #15 scalpel blade.  The harvested graft was then trimmed of adipose tissue until only dermis and epidermis was left.  The skin margins of the secondary defect were undermined to an appropriate distance in all directions utilizing iris scissors.  The secondary defect was closed with interrupted buried subcutaneous sutures.  The skin edges were then re-apposed with running  sutures.  The skin graft was then placed in the primary defect and oriented appropriately.
Split-Thickness Skin Graft Text: The defect edges were debeveled with a #15 scalpel blade.  Given the location of the defect, shape of the defect and the proximity to free margins a split thickness skin graft was deemed most appropriate.  Using a sterile surgical marker, the primary defect shape was transferred to the donor site. The split thickness graft was then harvested.  The skin graft was then placed in the primary defect and oriented appropriately.
Pinch Graft Text: The defect edges were debeveled with a #15 scalpel blade. Given the location of the defect, shape of the defect and the proximity to free margins a pinch graft was deemed most appropriate. Using a sterile surgical marker, the primary defect shape was transferred to the donor site. The area thus outlined was incised deep to adipose tissue with a #15 scalpel blade.  The harvested graft was then trimmed of adipose tissue until only dermis and epidermis was left. The skin graft was then placed in the primary defect and oriented appropriately.
Burow's Graft Text: The defect edges were debeveled with a #15 scalpel blade.  Given the location of the defect, shape of the defect, the proximity to free margins and the presence of a standing cone deformity a Burow's skin graft was deemed most appropriate. The standing cone was removed and this tissue was then trimmed to the shape of the primary defect. The adipose tissue was also removed until only dermis and epidermis were left.  The skin margins of the secondary defect were undermined to an appropriate distance in all directions utilizing iris scissors.  The secondary defect was closed with interrupted buried subcutaneous sutures.  The skin edges were then re-apposed with running  sutures.  The skin graft was then placed in the primary defect and oriented appropriately.
Cartilage Graft Text: The defect edges were debeveled with a #15 scalpel blade.  Given the location of the defect, shape of the defect, the fact the defect involved a full thickness cartilage defect a cartilage graft was deemed most appropriate.  An appropriate donor site was identified, cleansed, and anesthetized. The cartilage graft was then harvested and transferred to the recipient site, oriented appropriately and then sutured into place.  The secondary defect was then repaired using a primary closure.
Composite Graft Text: The defect edges were debeveled with a #15 scalpel blade.  Given the location of the defect, shape of the defect, the proximity to free margins and the fact the defect was full thickness a composite graft was deemed most appropriate.  The defect was outline and then transferred to the donor site.  A full thickness graft was then excised from the donor site. The graft was then placed in the primary defect, oriented appropriately and then sutured into place.  The secondary defect was then repaired using a primary closure.
Epidermal Autograft Text: The defect edges were debeveled with a #15 scalpel blade.  Given the location of the defect, shape of the defect and the proximity to free margins an epidermal autograft was deemed most appropriate.  Using a sterile surgical marker, the primary defect shape was transferred to the donor site. The epidermal graft was then harvested.  The skin graft was then placed in the primary defect and oriented appropriately.
Dermal Autograft Text: The defect edges were debeveled with a #15 scalpel blade.  Given the location of the defect, shape of the defect and the proximity to free margins a dermal autograft was deemed most appropriate.  Using a sterile surgical marker, the primary defect shape was transferred to the donor site. The area thus outlined was incised deep to adipose tissue with a #15 scalpel blade.  The harvested graft was then trimmed of adipose and epidermal tissue until only dermis was left.  The skin graft was then placed in the primary defect and oriented appropriately.
Skin Substitute Text: The defect edges were debeveled with a #15 scalpel blade.  Given the location of the defect, shape of the defect and the proximity to free margins a skin substitute graft was deemed most appropriate.  The graft material was trimmed to fit the size of the defect. The graft was then placed in the primary defect and oriented appropriately.
Tissue Cultured Epidermal Autograft Text: The defect edges were debeveled with a #15 scalpel blade.  Given the location of the defect, shape of the defect and the proximity to free margins a tissue cultured epidermal autograft was deemed most appropriate.  The graft was then trimmed to fit the size of the defect.  The graft was then placed in the primary defect and oriented appropriately.
Xenograft Text: The defect edges were debeveled with a #15 scalpel blade.  Given the location of the defect, shape of the defect and the proximity to free margins a xenograft was deemed most appropriate.  The graft was then trimmed to fit the size of the defect.  The graft was then placed in the primary defect and oriented appropriately.
Purse String (Intermediate) Text: Given the location of the defect and the characteristics of the surrounding skin a purse string intermediate closure was deemed most appropriate.  Undermining was performed circumfirentially around the surgical defect.  A purse string suture was then placed and tightened.
Purse String (Simple) Text: Given the location of the defect and the characteristics of the surrounding skin a purse string simple closure was deemed most appropriate.  Undermining was performed circumferentially around the surgical defect.  A purse string suture was then placed and tightened.
Partial Purse String (Intermediate) Text: Given the location of the defect and the characteristics of the surrounding skin an intermediate purse string closure was deemed most appropriate.  Undermining was performed circumferentially around the surgical defect.  A purse string suture was then placed and tightened. Wound tension of the circular defect prevented complete closure of the wound.
Partial Purse String (Simple) Text: Given the location of the defect and the characteristics of the surrounding skin a simple purse string closure was deemed most appropriate.  Undermining was performed circumferentially around the surgical defect.  A purse string suture was then placed and tightened. Wound tension of the circular defect prevented complete closure of the wound.
Complex Repair And Single Advancement Flap Text: The defect edges were debeveled with a #15 scalpel blade.  The primary defect was closed partially with a complex linear closure.  Given the location of the remaining defect, shape of the defect and the proximity to free margins a single advancement flap was deemed most appropriate for complete closure of the defect.  Using a sterile surgical marker, an appropriate advancement flap was drawn incorporating the defect and placing the expected incisions within the relaxed skin tension lines where possible.    The area thus outlined was incised deep to adipose tissue with a #15 scalpel blade.  The skin margins were undermined to an appropriate distance in all directions utilizing iris scissors.
Complex Repair And Double Advancement Flap Text: The defect edges were debeveled with a #15 scalpel blade.  The primary defect was closed partially with a complex linear closure.  Given the location of the remaining defect, shape of the defect and the proximity to free margins a double advancement flap was deemed most appropriate for complete closure of the defect.  Using a sterile surgical marker, an appropriate advancement flap was drawn incorporating the defect and placing the expected incisions within the relaxed skin tension lines where possible.    The area thus outlined was incised deep to adipose tissue with a #15 scalpel blade.  The skin margins were undermined to an appropriate distance in all directions utilizing iris scissors.
Complex Repair And Modified Advancement Flap Text: The defect edges were debeveled with a #15 scalpel blade.  The primary defect was closed partially with a complex linear closure.  Given the location of the remaining defect, shape of the defect and the proximity to free margins a modified advancement flap was deemed most appropriate for complete closure of the defect.  Using a sterile surgical marker, an appropriate advancement flap was drawn incorporating the defect and placing the expected incisions within the relaxed skin tension lines where possible.    The area thus outlined was incised deep to adipose tissue with a #15 scalpel blade.  The skin margins were undermined to an appropriate distance in all directions utilizing iris scissors.
Complex Repair And A-T Advancement Flap Text: The defect edges were debeveled with a #15 scalpel blade.  The primary defect was closed partially with a complex linear closure.  Given the location of the remaining defect, shape of the defect and the proximity to free margins an A-T advancement flap was deemed most appropriate for complete closure of the defect.  Using a sterile surgical marker, an appropriate advancement flap was drawn incorporating the defect and placing the expected incisions within the relaxed skin tension lines where possible.    The area thus outlined was incised deep to adipose tissue with a #15 scalpel blade.  The skin margins were undermined to an appropriate distance in all directions utilizing iris scissors.
Complex Repair And O-T Advancement Flap Text: The defect edges were debeveled with a #15 scalpel blade.  The primary defect was closed partially with a complex linear closure.  Given the location of the remaining defect, shape of the defect and the proximity to free margins an O-T advancement flap was deemed most appropriate for complete closure of the defect.  Using a sterile surgical marker, an appropriate advancement flap was drawn incorporating the defect and placing the expected incisions within the relaxed skin tension lines where possible.    The area thus outlined was incised deep to adipose tissue with a #15 scalpel blade.  The skin margins were undermined to an appropriate distance in all directions utilizing iris scissors.
Complex Repair And O-L Flap Text: The defect edges were debeveled with a #15 scalpel blade.  The primary defect was closed partially with a complex linear closure.  Given the location of the remaining defect, shape of the defect and the proximity to free margins an O-L flap was deemed most appropriate for complete closure of the defect.  Using a sterile surgical marker, an appropriate flap was drawn incorporating the defect and placing the expected incisions within the relaxed skin tension lines where possible.    The area thus outlined was incised deep to adipose tissue with a #15 scalpel blade.  The skin margins were undermined to an appropriate distance in all directions utilizing iris scissors.
Complex Repair And Bilobe Flap Text: The defect edges were debeveled with a #15 scalpel blade.  The primary defect was closed partially with a complex linear closure.  Given the location of the remaining defect, shape of the defect and the proximity to free margins a bilobe flap was deemed most appropriate for complete closure of the defect.  Using a sterile surgical marker, an appropriate advancement flap was drawn incorporating the defect and placing the expected incisions within the relaxed skin tension lines where possible.    The area thus outlined was incised deep to adipose tissue with a #15 scalpel blade.  The skin margins were undermined to an appropriate distance in all directions utilizing iris scissors.
Complex Repair And Melolabial Flap Text: The defect edges were debeveled with a #15 scalpel blade.  The primary defect was closed partially with a complex linear closure.  Given the location of the remaining defect, shape of the defect and the proximity to free margins a melolabial flap was deemed most appropriate for complete closure of the defect.  Using a sterile surgical marker, an appropriate advancement flap was drawn incorporating the defect and placing the expected incisions within the relaxed skin tension lines where possible.    The area thus outlined was incised deep to adipose tissue with a #15 scalpel blade.  The skin margins were undermined to an appropriate distance in all directions utilizing iris scissors.
Complex Repair And Rotation Flap Text: The defect edges were debeveled with a #15 scalpel blade.  The primary defect was closed partially with a complex linear closure.  Given the location of the remaining defect, shape of the defect and the proximity to free margins a rotation flap was deemed most appropriate for complete closure of the defect.  Using a sterile surgical marker, an appropriate advancement flap was drawn incorporating the defect and placing the expected incisions within the relaxed skin tension lines where possible.    The area thus outlined was incised deep to adipose tissue with a #15 scalpel blade.  The skin margins were undermined to an appropriate distance in all directions utilizing iris scissors.
Complex Repair And Rhombic Flap Text: The defect edges were debeveled with a #15 scalpel blade.  The primary defect was closed partially with a complex linear closure.  Given the location of the remaining defect, shape of the defect and the proximity to free margins a rhombic flap was deemed most appropriate for complete closure of the defect.  Using a sterile surgical marker, an appropriate advancement flap was drawn incorporating the defect and placing the expected incisions within the relaxed skin tension lines where possible.    The area thus outlined was incised deep to adipose tissue with a #15 scalpel blade.  The skin margins were undermined to an appropriate distance in all directions utilizing iris scissors.
Complex Repair And Transposition Flap Text: The defect edges were debeveled with a #15 scalpel blade.  The primary defect was closed partially with a complex linear closure.  Given the location of the remaining defect, shape of the defect and the proximity to free margins a transposition flap was deemed most appropriate for complete closure of the defect.  Using a sterile surgical marker, an appropriate advancement flap was drawn incorporating the defect and placing the expected incisions within the relaxed skin tension lines where possible.    The area thus outlined was incised deep to adipose tissue with a #15 scalpel blade.  The skin margins were undermined to an appropriate distance in all directions utilizing iris scissors.
Complex Repair And V-Y Plasty Text: The defect edges were debeveled with a #15 scalpel blade.  The primary defect was closed partially with a complex linear closure.  Given the location of the remaining defect, shape of the defect and the proximity to free margins a V-Y plasty was deemed most appropriate for complete closure of the defect.  Using a sterile surgical marker, an appropriate advancement flap was drawn incorporating the defect and placing the expected incisions within the relaxed skin tension lines where possible.    The area thus outlined was incised deep to adipose tissue with a #15 scalpel blade.  The skin margins were undermined to an appropriate distance in all directions utilizing iris scissors.
Complex Repair And M Plasty Text: The defect edges were debeveled with a #15 scalpel blade.  The primary defect was closed partially with a complex linear closure.  Given the location of the remaining defect, shape of the defect and the proximity to free margins an M plasty was deemed most appropriate for complete closure of the defect.  Using a sterile surgical marker, an appropriate advancement flap was drawn incorporating the defect and placing the expected incisions within the relaxed skin tension lines where possible.    The area thus outlined was incised deep to adipose tissue with a #15 scalpel blade.  The skin margins were undermined to an appropriate distance in all directions utilizing iris scissors.
Complex Repair And Double M Plasty Text: The defect edges were debeveled with a #15 scalpel blade.  The primary defect was closed partially with a complex linear closure.  Given the location of the remaining defect, shape of the defect and the proximity to free margins a double M plasty was deemed most appropriate for complete closure of the defect.  Using a sterile surgical marker, an appropriate advancement flap was drawn incorporating the defect and placing the expected incisions within the relaxed skin tension lines where possible.    The area thus outlined was incised deep to adipose tissue with a #15 scalpel blade.  The skin margins were undermined to an appropriate distance in all directions utilizing iris scissors.
Complex Repair And W Plasty Text: The defect edges were debeveled with a #15 scalpel blade.  The primary defect was closed partially with a complex linear closure.  Given the location of the remaining defect, shape of the defect and the proximity to free margins a W plasty was deemed most appropriate for complete closure of the defect.  Using a sterile surgical marker, an appropriate advancement flap was drawn incorporating the defect and placing the expected incisions within the relaxed skin tension lines where possible.    The area thus outlined was incised deep to adipose tissue with a #15 scalpel blade.  The skin margins were undermined to an appropriate distance in all directions utilizing iris scissors.
Complex Repair And Z Plasty Text: The defect edges were debeveled with a #15 scalpel blade.  The primary defect was closed partially with a complex linear closure.  Given the location of the remaining defect, shape of the defect and the proximity to free margins a Z plasty was deemed most appropriate for complete closure of the defect.  Using a sterile surgical marker, an appropriate advancement flap was drawn incorporating the defect and placing the expected incisions within the relaxed skin tension lines where possible.    The area thus outlined was incised deep to adipose tissue with a #15 scalpel blade.  The skin margins were undermined to an appropriate distance in all directions utilizing iris scissors.
Complex Repair And Dorsal Nasal Flap Text: The defect edges were debeveled with a #15 scalpel blade.  The primary defect was closed partially with a complex linear closure.  Given the location of the remaining defect, shape of the defect and the proximity to free margins a dorsal nasal flap was deemed most appropriate for complete closure of the defect.  Using a sterile surgical marker, an appropriate flap was drawn incorporating the defect and placing the expected incisions within the relaxed skin tension lines where possible.    The area thus outlined was incised deep to adipose tissue with a #15 scalpel blade.  The skin margins were undermined to an appropriate distance in all directions utilizing iris scissors.
Complex Repair And Ftsg Text: The defect edges were debeveled with a #15 scalpel blade.  The primary defect was closed partially with a complex linear closure.  Given the location of the defect, shape of the defect and the proximity to free margins a full thickness skin graft was deemed most appropriate to repair the remaining defect.  The graft was trimmed to fit the size of the remaining defect.  The graft was then placed in the primary defect, oriented appropriately, and sutured into place.
Complex Repair And Burow's Graft Text: The defect edges were debeveled with a #15 scalpel blade.  The primary defect was closed partially with a complex linear closure.  Given the location of the defect, shape of the defect, the proximity to free margins and the presence of a standing cone deformity a Burow's graft was deemed most appropriate to repair the remaining defect.  The graft was trimmed to fit the size of the remaining defect.  The graft was then placed in the primary defect, oriented appropriately, and sutured into place.
Complex Repair And Split-Thickness Skin Graft Text: The defect edges were debeveled with a #15 scalpel blade.  The primary defect was closed partially with a complex linear closure.  Given the location of the defect, shape of the defect and the proximity to free margins a split thickness skin graft was deemed most appropriate to repair the remaining defect.  The graft was trimmed to fit the size of the remaining defect.  The graft was then placed in the primary defect, oriented appropriately, and sutured into place.
Complex Repair And Epidermal Autograft Text: The defect edges were debeveled with a #15 scalpel blade.  The primary defect was closed partially with a complex linear closure.  Given the location of the defect, shape of the defect and the proximity to free margins an epidermal autograft was deemed most appropriate to repair the remaining defect.  The graft was trimmed to fit the size of the remaining defect.  The graft was then placed in the primary defect, oriented appropriately, and sutured into place.
Complex Repair And Dermal Autograft Text: The defect edges were debeveled with a #15 scalpel blade.  The primary defect was closed partially with a complex linear closure.  Given the location of the defect, shape of the defect and the proximity to free margins an dermal autograft was deemed most appropriate to repair the remaining defect.  The graft was trimmed to fit the size of the remaining defect.  The graft was then placed in the primary defect, oriented appropriately, and sutured into place.
Complex Repair And Tissue Cultured Epidermal Autograft Text: The defect edges were debeveled with a #15 scalpel blade.  The primary defect was closed partially with a complex linear closure.  Given the location of the defect, shape of the defect and the proximity to free margins an tissue cultured epidermal autograft was deemed most appropriate to repair the remaining defect.  The graft was trimmed to fit the size of the remaining defect.  The graft was then placed in the primary defect, oriented appropriately, and sutured into place.
Complex Repair And Xenograft Text: The defect edges were debeveled with a #15 scalpel blade.  The primary defect was closed partially with a complex linear closure.  Given the location of the defect, shape of the defect and the proximity to free margins a xenograft was deemed most appropriate to repair the remaining defect.  The graft was trimmed to fit the size of the remaining defect.  The graft was then placed in the primary defect, oriented appropriately, and sutured into place.
Complex Repair And Skin Substitute Graft Text: The defect edges were debeveled with a #15 scalpel blade.  The primary defect was closed partially with a complex linear closure.  Given the location of the remaining defect, shape of the defect and the proximity to free margins a skin substitute graft was deemed most appropriate to repair the remaining defect.  The graft was trimmed to fit the size of the remaining defect.  The graft was then placed in the primary defect, oriented appropriately, and sutured into place.
Include Anticoagulation In Mohs Note?: Please Select the Appropriate Response
Path Notes (To The Dermatopathologist): Please check margins.
Consent was obtained from the patient. The risks and benefits to therapy were discussed in detail. Specifically, the risks of infection, scarring, bleeding, prolonged wound healing, incomplete removal, allergy to anesthesia, nerve injury and recurrence were addressed. Prior to the procedure, the treatment site was clearly identified and confirmed by the patient. All components of Universal Protocol/PAUSE Rule completed.
Post-Care Instructions: I reviewed with the patient in detail post-care instructions. Patient is not to engage in any heavy lifting, exercise, or swimming for the next 14 days. Should the patient develop any fevers, chills, bleeding, severe pain patient will contact the office immediately.
Home Suture Removal Text: Patient was provided a home suture removal kit and will remove their sutures at home.  If they have any questions or difficulties they will call the office.
Where Do You Want The Question To Include Opioid Counseling Located?: Case Summary Tab
Information: Selecting Yes will display possible errors in your note based on the variables you have selected. This validation is only offered as a suggestion for you. PLEASE NOTE THAT THE VALIDATION TEXT WILL BE REMOVED WHEN YOU FINALIZE YOUR NOTE. IF YOU WANT TO FAX A PRELIMINARY NOTE YOU WILL NEED TO TOGGLE THIS TO 'NO' IF YOU DO NOT WANT IT IN YOUR FAXED NOTE.

## 2025-07-01 ENCOUNTER — APPOINTMENT (OUTPATIENT)
Dept: URBAN - NONMETROPOLITAN AREA CLINIC 4 | Facility: CLINIC | Age: 85
Setting detail: DERMATOLOGY
End: 2025-07-01

## 2025-07-01 DIAGNOSIS — Z48.817 ENCOUNTER FOR SURGICAL AFTERCARE FOLLOWING SURGERY ON THE SKIN AND SUBCUTANEOUS TISSUE: ICD-10-CM

## 2025-07-01 PROCEDURE — ? PRESCRIPTION MEDICATION MANAGEMENT

## 2025-07-01 PROCEDURE — ? PRESCRIPTION

## 2025-07-01 PROCEDURE — ? COUNSELING

## 2025-07-01 RX ORDER — SULFAMETHOXAZOLE AND TRIMETHOPRIM 800; 160 MG/1; MG/1
800 MG - 160 MG TABLET ORAL BID
Qty: 28 | Refills: 0 | Status: ERX | COMMUNITY
Start: 2025-07-01

## 2025-07-01 RX ADMIN — SULFAMETHOXAZOLE AND TRIMETHOPRIM 800 MG - 160 MG: 800; 160 TABLET ORAL at 00:00

## 2025-07-01 ASSESSMENT — LOCATION ZONE DERM: LOCATION ZONE: ARM

## 2025-07-01 ASSESSMENT — PAIN INTENSITY VAS: HOW INTENSE IS YOUR PAIN 0 BEING NO PAIN, 10 BEING THE MOST SEVERE PAIN POSSIBLE?: 6/10 PAIN

## 2025-07-01 ASSESSMENT — LOCATION SIMPLE DESCRIPTION DERM: LOCATION SIMPLE: LEFT SHOULDER

## 2025-07-01 ASSESSMENT — LOCATION DETAILED DESCRIPTION DERM: LOCATION DETAILED: LEFT POSTERIOR SHOULDER

## 2025-07-01 NOTE — PROCEDURE: PRESCRIPTION MEDICATION MANAGEMENT
Render In Strict Bullet Format?: No
Detail Level: Zone
Initiate Treatment: Bactrim  mg - 160 mg BID x 14 days
Continue Regimen: Mupirocin 2% ointment TID

## 2025-07-03 ENCOUNTER — OPTICAL OFFICE (OUTPATIENT)
Dept: URBAN - NONMETROPOLITAN AREA CLINIC 4 | Facility: CLINIC | Age: 85
Setting detail: OPHTHALMOLOGY
End: 2025-07-03

## 2025-07-03 ENCOUNTER — DOCTOR'S OFFICE (OUTPATIENT)
Dept: URBAN - NONMETROPOLITAN AREA CLINIC 1 | Facility: CLINIC | Age: 85
Setting detail: OPHTHALMOLOGY
End: 2025-07-03

## 2025-07-03 DIAGNOSIS — H52.03: ICD-10-CM

## 2025-07-03 DIAGNOSIS — H52.4: ICD-10-CM

## 2025-07-03 PROCEDURE — 92015 DETERMINE REFRACTIVE STATE: CPT

## 2025-07-03 PROCEDURE — V2020 VISION SVCS FRAMES PURCHASES: HCPCS

## 2025-07-03 PROCEDURE — V2781 PROGRESSIVE LENS PER LENS: HCPCS

## 2025-07-03 PROCEDURE — V2781 PROGRESSIVE LENS PER LENS: HCPCS | Mod: LT

## 2025-07-03 ASSESSMENT — LID POSITION - DERMATOCHALASIS
OD_DERMATOCHALASIS: RUL 2+
OS_DERMATOCHALASIS: LUL 2+

## 2025-07-03 ASSESSMENT — PUNCTA - ASSESSMENT
OD_PUNCTA: SIL PLUG
OS_PUNCTA: SIL PLUG

## 2025-07-03 ASSESSMENT — LID POSITION - PTOSIS
OS_PTOSIS: 1+
OD_PTOSIS: 1+

## 2025-07-03 ASSESSMENT — LID POSITION - ECTROPION
OD_ECTROPION: RLL
OS_ECTROPION: LLL

## 2025-07-03 ASSESSMENT — SUPERFICIAL PUNCTATE KERATITIS (SPK)
OS_SPK: 2+
OD_SPK: 2+

## 2025-07-03 ASSESSMENT — LID POSITION - COMMENTS
OS_COMMENTS: MRD1: 2/2
OD_COMMENTS: MRD1: 2/2

## 2025-07-03 ASSESSMENT — LACRIMAL DUCT - ASSESSMENT
OS_LACRIMAL_DUCT: LLL
OD_LACRIMAL_DUCT: RLL

## 2025-07-07 ENCOUNTER — RX ONLY (RX ONLY)
Age: 85
End: 2025-07-07

## 2025-07-07 RX ORDER — HYDROCORTISONE 25 MG/G
2.5% CREAM TOPICAL
Qty: 28.35 | Refills: 1 | Status: ERX | COMMUNITY
Start: 2025-07-07

## 2025-07-08 ENCOUNTER — APPOINTMENT (OUTPATIENT)
Dept: URBAN - NONMETROPOLITAN AREA CLINIC 4 | Facility: CLINIC | Age: 85
Setting detail: DERMATOLOGY
End: 2025-07-08

## 2025-07-08 DIAGNOSIS — Z48.02 ENCOUNTER FOR REMOVAL OF SUTURES: ICD-10-CM

## 2025-07-08 PROCEDURE — ? SUTURE REMOVAL

## 2025-07-08 PROCEDURE — ? PHOTO-DOCUMENTATION

## 2025-07-08 ASSESSMENT — REFRACTION_CURRENTRX
OS_OVR_VA: 20/
OS_CYLINDER: -1.25
OD_SPHERE: +0.25
OD_OVR_VA: 20/
OD_AXIS: 107
OS_OVR_VA: 20/
OS_ADD: +2.75
OS_VPRISM_DIRECTION: PROGS
OS_CYLINDER: -1.25
OS_SPHERE: +1.75
OD_VPRISM_DIRECTION: PROGS
OD_ADD: +2.75
OD_AXIS: 104
OS_AXIS: 057
OS_VPRISM_DIRECTION: PROGS
OD_SPHERE: +0.75
OD_VPRISM_DIRECTION: PROGS
OS_SPHERE: +1.75
OD_CYLINDER: -1.75
OD_OVR_VA: 20/
OS_AXIS: 060
OD_CYLINDER: -1.75
OD_ADD: +2.75
OS_ADD: +2.75

## 2025-07-08 ASSESSMENT — REFRACTION_AUTOREFRACTION
OD_CYLINDER: -2.75
OD_AXIS: 88
OS_AXIS: 73
OS_SPHERE: +3.00
OD_SPHERE: +1.50
OS_CYLINDER: -2.50

## 2025-07-08 ASSESSMENT — REFRACTION_MANIFEST
OS_VA1: 20/60
OS_CYLINDER: -1.50
OD_CYLINDER: -2.00
OU_VA: 20/50
OD_AXIS: 090
OS_ADD: +3.00
OS_VA2: 20/30
OD_SPHERE: +1.00
OD_VA2: 20/30
OS_SPHERE: +2.00
OD_VA1: 20/50
OS_AXIS: 075
OD_ADD: +3.00

## 2025-07-08 ASSESSMENT — KERATOMETRY
OS_K1POWER_DIOPTERS: 43.25
OS_K2POWER_DIOPTERS: 44.75
OD_K2POWER_DIOPTERS: 44.75
OD_K1POWER_DIOPTERS: 43.75
OS_AXISANGLE_DEGREES: 154
OD_AXISANGLE_DEGREES: 006

## 2025-07-08 ASSESSMENT — LOCATION ZONE DERM: LOCATION ZONE: ARM

## 2025-07-08 ASSESSMENT — LOCATION DETAILED DESCRIPTION DERM: LOCATION DETAILED: LEFT ANTERIOR SHOULDER

## 2025-07-08 ASSESSMENT — LOCATION SIMPLE DESCRIPTION DERM: LOCATION SIMPLE: LEFT SHOULDER

## 2025-07-08 ASSESSMENT — VISUAL ACUITY
OS_BCVA: 20/40-2
OD_BCVA: 20/60+2

## 2025-07-08 NOTE — HPI: SUTURE REMOVAL
Additional History: Patient states Dr. Israel MD advised her to stop taking the Bactrim Ds 800-160mg Daily and applying the Mupirocin 2% topical ointment , she is currently using the hydrocortisone 2.5% topical cream BID x 10 days as of yesterday. States the area is red and tender.

## 2025-07-08 NOTE — PROCEDURE: SUTURE REMOVAL
Detail Level: Detailed
Add 1585x Cpt? (Do Not Bill If You Billed For The Procedure Placing The Sutures. This Is An Add-On Code That Must Be Billed With An E/M Visit Code): Yes - 64263

## 2025-07-10 ENCOUNTER — APPOINTMENT (OUTPATIENT)
Dept: URBAN - NONMETROPOLITAN AREA CLINIC 4 | Facility: CLINIC | Age: 85
Setting detail: DERMATOLOGY
End: 2025-07-10

## 2025-07-10 PROBLEM — C44.612 BASAL CELL CARCINOMA OF SKIN OF RIGHT UPPER LIMB, INCLUDING SHOULDER: Status: ACTIVE | Noted: 2025-07-10

## 2025-07-10 PROCEDURE — ? CURETTAGE AND DESTRUCTION WITH PATHOLOGY

## 2025-07-10 PROCEDURE — ? COUNSELING

## 2025-07-10 PROCEDURE — ? PHOTO-DOCUMENTATION

## 2025-07-10 NOTE — PROCEDURE: CURETTAGE AND DESTRUCTION WITH PATHOLOGY
Detail Level: Detailed
Size Of Lesion In Cm: 1.5
Size Of Lesion After Curettage: 2.2
Anesthesia Type: 1% lidocaine with epinephrine
Anesthesia Volume In Cc: 1
Cautery Type: electrodesiccation
Number Of Curettages: 2
What Was Performed First?: Curettage
Additional Information: (Optional): The wound was cleaned, and a pressure dressing was applied.  The patient received detailed post-op instructions.
Lab: [id number]
Lab Facility: [id number]
Histology Text: Following the procedure a portion of the curetted material was sent for histologic evaluation.
Biopsy Type: H and E
Render Path Notes In Note?: No
Consent was obtained from the patient. The risks, benefits and alternatives to therapy were discussed in detail. Specifically, the risks of infection, scarring, bleeding, prolonged wound healing, nerve injury, incomplete removal, allergy to anesthesia and recurrence were addressed. Alternatives to ED&C, such as: surgical removal and XRT were also discussed.  Prior to the procedure, the treatment site was clearly identified and confirmed by the patient. All components of Universal Protocol/PAUSE Rule completed.
Post-Care Instructions: I reviewed with the patient in detail post-care instructions. Patient is to keep the area dry for 48 hours, and not to engage in any swimming until the area is healed. Should the patient develop any fevers, chills, bleeding, severe pain patient will contact the office immediately.
Billing Type: Third-Party Bill
Bill As A Line Item Or As Units: Line Item

## 2025-08-29 ENCOUNTER — DOCTOR'S OFFICE (OUTPATIENT)
Dept: URBAN - NONMETROPOLITAN AREA CLINIC 1 | Facility: CLINIC | Age: 85
Setting detail: OPHTHALMOLOGY
End: 2025-08-29
Payer: MEDICARE

## 2025-08-29 DIAGNOSIS — H02.433: ICD-10-CM

## 2025-08-29 DIAGNOSIS — H40.1121: ICD-10-CM

## 2025-08-29 DIAGNOSIS — H04.121: ICD-10-CM

## 2025-08-29 DIAGNOSIS — H02.413: ICD-10-CM

## 2025-08-29 DIAGNOSIS — H47.012: ICD-10-CM

## 2025-08-29 DIAGNOSIS — H02.423: ICD-10-CM

## 2025-08-29 DIAGNOSIS — H02.403: ICD-10-CM

## 2025-08-29 DIAGNOSIS — H43.813: ICD-10-CM

## 2025-08-29 DIAGNOSIS — H31.002: ICD-10-CM

## 2025-08-29 DIAGNOSIS — H40.1113: ICD-10-CM

## 2025-08-29 DIAGNOSIS — H04.122: ICD-10-CM

## 2025-08-29 DIAGNOSIS — H02.831: ICD-10-CM

## 2025-08-29 PROBLEM — H35.40 PERIPAPILLARY ATROPHY: Status: ACTIVE | Noted: 2025-08-29

## 2025-08-29 PROBLEM — H26.493 POSTERIOR CAPSULAR OPACIFICATION; BOTH EYES: Status: ACTIVE | Noted: 2025-08-29

## 2025-08-29 PROCEDURE — 92133 CPTRZD OPH DX IMG PST SGM ON: CPT | Performed by: OPHTHALMOLOGY

## 2025-08-29 PROCEDURE — 99213 OFFICE O/P EST LOW 20 MIN: CPT | Performed by: OPHTHALMOLOGY

## 2025-08-29 ASSESSMENT — REFRACTION_AUTOREFRACTION
OD_AXIS: 088
OD_CYLINDER: -2.00
OD_SPHERE: +0.50
OS_CYLINDER: -2.75
OS_SPHERE: +2.25
OS_AXIS: 066

## 2025-08-29 ASSESSMENT — REFRACTION_CURRENTRX
OD_VPRISM_DIRECTION: PROGS
OS_VPRISM_DIRECTION: PROGS
OS_SPHERE: +1.75
OD_OVR_VA: 20/
OS_OVR_VA: 20/
OS_ADD: +3.00
OS_SPHERE: +2.00
OD_VPRISM_DIRECTION: PROGS
OD_CYLINDER: -2.00
OD_AXIS: 104
OD_ADD: +2.75
OD_SPHERE: +1.00
OD_OVR_VA: 20/
OS_CYLINDER: -1.50
OD_AXIS: 094
OS_CYLINDER: -1.25
OD_CYLINDER: -1.75
OS_AXIS: 078
OS_AXIS: 057
OS_VPRISM_DIRECTION: PROGS
OD_SPHERE: +0.25
OS_OVR_VA: 20/
OD_ADD: +3.00
OS_ADD: +2.75

## 2025-08-29 ASSESSMENT — KERATOMETRY
OD_K2POWER_DIOPTERS: 887.55
OS_K1POWER_DIOPTERS: 7.79
OD_AXISANGLE_DEGREES: 178
OS_K2POWER_DIOPTERS: 817.51
OD_K1POWER_DIOPTERS: 7.78
OS_AXISANGLE_DEGREES: 171

## 2025-08-29 ASSESSMENT — LID POSITION - ECTROPION
OS_ECTROPION: LLL
OD_ECTROPION: RLL

## 2025-08-29 ASSESSMENT — LID POSITION - COMMENTS
OS_COMMENTS: MRD1: 2/2
OD_COMMENTS: MRD1: 2/2

## 2025-08-29 ASSESSMENT — LID POSITION - DERMATOCHALASIS
OS_DERMATOCHALASIS: LUL 2+
OD_DERMATOCHALASIS: RUL 2+

## 2025-08-29 ASSESSMENT — REFRACTION_MANIFEST
OS_CYLINDER: -1.50
OD_VA1: 20/50
OS_AXIS: 075
OS_VA2: 20/30
OS_ADD: +3.00
OD_AXIS: 090
OS_VA1: 20/60
OD_CYLINDER: -2.00
OU_VA: 20/50
OS_SPHERE: +2.00
OD_VA2: 20/30
OD_ADD: +3.00
OD_SPHERE: +1.00

## 2025-08-29 ASSESSMENT — PACHYMETRY
OD_CT_CORRECTION: -2
OS_CT_UM: 567
OD_CT_UM: 575
OS_CT_CORRECTION: -1

## 2025-08-29 ASSESSMENT — LACRIMAL DUCT - ASSESSMENT
OS_LACRIMAL_DUCT: LLL
OD_LACRIMAL_DUCT: RLL

## 2025-08-29 ASSESSMENT — CONFRONTATIONAL VISUAL FIELD TEST (CVF)
OD_FINDINGS: FULL
OS_FINDINGS: FULL

## 2025-08-29 ASSESSMENT — TONOMETRY
OS_IOP_MMHG: 10
OD_IOP_MMHG: 11

## 2025-08-29 ASSESSMENT — LID POSITION - PTOSIS
OS_PTOSIS: 1+
OD_PTOSIS: 1+

## 2025-08-29 ASSESSMENT — PUNCTA - ASSESSMENT
OD_PUNCTA: SIL PLUG
OS_PUNCTA: SIL PLUG

## 2025-08-29 ASSESSMENT — VISUAL ACUITY
OD_BCVA: 20/40-1
OS_BCVA: 20/60-2